# Patient Record
Sex: FEMALE | Race: WHITE | NOT HISPANIC OR LATINO | Employment: FULL TIME
[De-identification: names, ages, dates, MRNs, and addresses within clinical notes are randomized per-mention and may not be internally consistent; named-entity substitution may affect disease eponyms.]

---

## 2017-07-15 ENCOUNTER — HEALTH MAINTENANCE LETTER (OUTPATIENT)
Age: 22
End: 2017-07-15

## 2018-01-22 ENCOUNTER — OFFICE VISIT - HEALTHEAST (OUTPATIENT)
Dept: FAMILY MEDICINE | Facility: CLINIC | Age: 23
End: 2018-01-22

## 2018-01-22 DIAGNOSIS — R11.10 RECURRENT VOMITING: ICD-10-CM

## 2018-01-22 DIAGNOSIS — R14.2 BELCHING: ICD-10-CM

## 2018-01-22 DIAGNOSIS — R87.610 ATYPICAL SQUAMOUS CELLS OF UNDETERMINED SIGNIFICANCE (ASCUS) ON PAPANICOLAOU SMEAR OF CERVIX: ICD-10-CM

## 2018-01-22 ASSESSMENT — MIFFLIN-ST. JEOR: SCORE: 1343.02

## 2018-01-26 ENCOUNTER — RECORDS - HEALTHEAST (OUTPATIENT)
Dept: ADMINISTRATIVE | Facility: OTHER | Age: 23
End: 2018-01-26

## 2018-02-01 ENCOUNTER — AMBULATORY - HEALTHEAST (OUTPATIENT)
Dept: FAMILY MEDICINE | Facility: CLINIC | Age: 23
End: 2018-02-01

## 2018-02-01 DIAGNOSIS — K21.9 GERD (GASTROESOPHAGEAL REFLUX DISEASE): ICD-10-CM

## 2018-06-21 ENCOUNTER — OFFICE VISIT - HEALTHEAST (OUTPATIENT)
Dept: FAMILY MEDICINE | Facility: CLINIC | Age: 23
End: 2018-06-21

## 2018-06-21 DIAGNOSIS — J03.90 TONSILLITIS: ICD-10-CM

## 2018-06-21 DIAGNOSIS — J35.8 TONSILLOLITH: ICD-10-CM

## 2018-07-17 ENCOUNTER — OFFICE VISIT - HEALTHEAST (OUTPATIENT)
Dept: OTOLARYNGOLOGY | Facility: CLINIC | Age: 23
End: 2018-07-17

## 2018-07-17 DIAGNOSIS — J35.01 CHRONIC TONSILLITIS: ICD-10-CM

## 2018-08-15 ENCOUNTER — OFFICE VISIT - HEALTHEAST (OUTPATIENT)
Dept: FAMILY MEDICINE | Facility: CLINIC | Age: 23
End: 2018-08-15

## 2018-08-15 DIAGNOSIS — Z01.818 ENCOUNTER FOR PREOPERATIVE EXAMINATION FOR GENERAL SURGICAL PROCEDURE: ICD-10-CM

## 2018-08-15 DIAGNOSIS — J35.8 TONSILLOLITH: ICD-10-CM

## 2018-08-15 LAB
HCG UR QL: NEGATIVE
HGB BLD-MCNC: 12.5 G/DL (ref 12–16)
SP GR UR STRIP: 1.02 (ref 1–1.03)

## 2018-08-22 ASSESSMENT — MIFFLIN-ST. JEOR: SCORE: 1374.78

## 2018-08-25 ENCOUNTER — ANESTHESIA - HEALTHEAST (OUTPATIENT)
Dept: SURGERY | Facility: AMBULATORY SURGERY CENTER | Age: 23
End: 2018-08-25

## 2018-08-27 ENCOUNTER — SURGERY - HEALTHEAST (OUTPATIENT)
Dept: SURGERY | Facility: AMBULATORY SURGERY CENTER | Age: 23
End: 2018-08-27

## 2018-10-02 ENCOUNTER — OFFICE VISIT - HEALTHEAST (OUTPATIENT)
Dept: OTOLARYNGOLOGY | Facility: CLINIC | Age: 23
End: 2018-10-02

## 2018-10-02 DIAGNOSIS — J35.01 CHRONIC TONSILLITIS: ICD-10-CM

## 2019-05-02 ENCOUNTER — OFFICE VISIT - HEALTHEAST (OUTPATIENT)
Dept: FAMILY MEDICINE | Facility: CLINIC | Age: 24
End: 2019-05-02

## 2019-05-02 DIAGNOSIS — Z12.4 SCREENING FOR CERVICAL CANCER: ICD-10-CM

## 2019-05-02 DIAGNOSIS — G43.009 MIGRAINE WITHOUT AURA AND WITHOUT STATUS MIGRAINOSUS, NOT INTRACTABLE: ICD-10-CM

## 2019-06-23 ENCOUNTER — COMMUNICATION - HEALTHEAST (OUTPATIENT)
Dept: FAMILY MEDICINE | Facility: CLINIC | Age: 24
End: 2019-06-23

## 2019-09-27 ENCOUNTER — OFFICE VISIT - HEALTHEAST (OUTPATIENT)
Dept: INTERNAL MEDICINE | Facility: CLINIC | Age: 24
End: 2019-09-27

## 2019-09-27 DIAGNOSIS — J01.90 ACUTE NON-RECURRENT SINUSITIS, UNSPECIFIED LOCATION: ICD-10-CM

## 2019-11-12 ENCOUNTER — COMMUNICATION - HEALTHEAST (OUTPATIENT)
Dept: FAMILY MEDICINE | Facility: CLINIC | Age: 24
End: 2019-11-12

## 2020-01-20 ENCOUNTER — COMMUNICATION - HEALTHEAST (OUTPATIENT)
Dept: FAMILY MEDICINE | Facility: CLINIC | Age: 25
End: 2020-01-20

## 2020-01-20 DIAGNOSIS — Z78.9 USES BIRTH CONTROL: ICD-10-CM

## 2020-03-23 ENCOUNTER — COMMUNICATION - HEALTHEAST (OUTPATIENT)
Dept: FAMILY MEDICINE | Facility: CLINIC | Age: 25
End: 2020-03-23

## 2020-03-23 DIAGNOSIS — Z78.9 USES BIRTH CONTROL: ICD-10-CM

## 2020-04-29 ENCOUNTER — OFFICE VISIT - HEALTHEAST (OUTPATIENT)
Dept: FAMILY MEDICINE | Facility: CLINIC | Age: 25
End: 2020-04-29

## 2020-04-29 DIAGNOSIS — R61 NIGHT SWEATS: ICD-10-CM

## 2020-06-01 ENCOUNTER — COMMUNICATION - HEALTHEAST (OUTPATIENT)
Dept: FAMILY MEDICINE | Facility: CLINIC | Age: 25
End: 2020-06-01

## 2020-06-01 DIAGNOSIS — Z78.9 USES BIRTH CONTROL: ICD-10-CM

## 2020-10-06 ENCOUNTER — OFFICE VISIT - HEALTHEAST (OUTPATIENT)
Dept: FAMILY MEDICINE | Facility: CLINIC | Age: 25
End: 2020-10-06

## 2020-10-06 ENCOUNTER — AMBULATORY - HEALTHEAST (OUTPATIENT)
Dept: LAB | Facility: CLINIC | Age: 25
End: 2020-10-06

## 2020-10-06 DIAGNOSIS — R31.9 URINARY TRACT INFECTION WITH HEMATURIA, SITE UNSPECIFIED: ICD-10-CM

## 2020-10-06 DIAGNOSIS — R30.0 DYSURIA: ICD-10-CM

## 2020-10-06 DIAGNOSIS — R35.0 URINARY FREQUENCY: ICD-10-CM

## 2020-10-06 DIAGNOSIS — N39.0 URINARY TRACT INFECTION WITH HEMATURIA, SITE UNSPECIFIED: ICD-10-CM

## 2020-10-06 LAB
ALBUMIN UR-MCNC: ABNORMAL MG/DL
APPEARANCE UR: ABNORMAL
BACTERIA #/AREA URNS HPF: ABNORMAL HPF
BILIRUB UR QL STRIP: NEGATIVE
COLOR UR AUTO: YELLOW
GLUCOSE UR STRIP-MCNC: NEGATIVE MG/DL
HGB UR QL STRIP: ABNORMAL
KETONES UR STRIP-MCNC: NEGATIVE MG/DL
LEUKOCYTE ESTERASE UR QL STRIP: ABNORMAL
NITRATE UR QL: NEGATIVE
PH UR STRIP: 5.5 [PH] (ref 5–8)
RBC #/AREA URNS AUTO: ABNORMAL HPF
SP GR UR STRIP: 1.02 (ref 1–1.03)
SQUAMOUS #/AREA URNS AUTO: ABNORMAL LPF
UROBILINOGEN UR STRIP-ACNC: ABNORMAL
WBC #/AREA URNS AUTO: ABNORMAL HPF

## 2020-10-08 LAB — BACTERIA SPEC CULT: ABNORMAL

## 2020-10-11 ENCOUNTER — COMMUNICATION - HEALTHEAST (OUTPATIENT)
Dept: FAMILY MEDICINE | Facility: CLINIC | Age: 25
End: 2020-10-11

## 2020-10-12 ENCOUNTER — COMMUNICATION - HEALTHEAST (OUTPATIENT)
Dept: FAMILY MEDICINE | Facility: CLINIC | Age: 25
End: 2020-10-12

## 2020-10-12 ENCOUNTER — OFFICE VISIT - HEALTHEAST (OUTPATIENT)
Dept: FAMILY MEDICINE | Facility: CLINIC | Age: 25
End: 2020-10-12

## 2020-10-12 DIAGNOSIS — R30.0 DYSURIA: ICD-10-CM

## 2020-10-12 DIAGNOSIS — M54.50 ACUTE BILATERAL LOW BACK PAIN WITHOUT SCIATICA: ICD-10-CM

## 2020-10-12 LAB
ALBUMIN UR-MCNC: ABNORMAL MG/DL
APPEARANCE UR: CLEAR
BACTERIA #/AREA URNS HPF: ABNORMAL HPF
BILIRUB UR QL STRIP: NEGATIVE
COLOR UR AUTO: YELLOW
GLUCOSE UR STRIP-MCNC: NEGATIVE MG/DL
HCG UR QL: NEGATIVE
HGB UR QL STRIP: ABNORMAL
KETONES UR STRIP-MCNC: ABNORMAL MG/DL
LEUKOCYTE ESTERASE UR QL STRIP: ABNORMAL
MUCOUS THREADS #/AREA URNS LPF: ABNORMAL LPF
NITRATE UR QL: NEGATIVE
PH UR STRIP: 6 [PH] (ref 5–8)
RBC #/AREA URNS AUTO: ABNORMAL HPF
SP GR UR STRIP: >=1.03 (ref 1–1.03)
SQUAMOUS #/AREA URNS AUTO: ABNORMAL LPF
UROBILINOGEN UR STRIP-ACNC: ABNORMAL
WBC #/AREA URNS AUTO: ABNORMAL HPF

## 2020-10-13 LAB — BACTERIA SPEC CULT: NO GROWTH

## 2020-10-19 ENCOUNTER — OFFICE VISIT (OUTPATIENT)
Dept: URGENT CARE | Facility: URGENT CARE | Age: 25
End: 2020-10-19
Payer: COMMERCIAL

## 2020-10-19 VITALS
DIASTOLIC BLOOD PRESSURE: 84 MMHG | HEART RATE: 100 BPM | TEMPERATURE: 98.4 F | SYSTOLIC BLOOD PRESSURE: 124 MMHG | BODY MASS INDEX: 25.61 KG/M2 | HEIGHT: 64 IN | WEIGHT: 150 LBS

## 2020-10-19 DIAGNOSIS — S61.211A LACERATION OF LEFT INDEX FINGER WITHOUT FOREIGN BODY WITHOUT DAMAGE TO NAIL, INITIAL ENCOUNTER: Primary | ICD-10-CM

## 2020-10-19 PROCEDURE — 12001 RPR S/N/AX/GEN/TRNK 2.5CM/<: CPT | Performed by: NURSE PRACTITIONER

## 2020-10-19 RX ORDER — NITROFURANTOIN 25; 75 MG/1; MG/1
CAPSULE ORAL
COMMUNITY
Start: 2020-10-06 | End: 2022-01-25

## 2020-10-19 ASSESSMENT — MIFFLIN-ST. JEOR: SCORE: 1410.4

## 2020-10-19 NOTE — PROGRESS NOTES
"SUBJECTIVE:     Chief Complaint   Patient presents with     Urgent Care     Laceration     cut to left index finger about 12:55 pm today with a new knife while cutting a bagle.      Regina Hernandez is a 25 year old female who presents to the clinic with a laceration on the left index finger sustained 1 hour(s) ago.  This is a non-work related injury.    Mechanism of injury: knife.    Associated symptoms: Denies numbness, weakness, or loss of function  Last tetanus booster within 10 years: yes    Medications and allergies reviewed.    Past Medical History:   Diagnosis Date     Headache(784.0) 9/06    headaches-advil-Amitriptyline     Unspecified otitis media      Volume depletion      EXAM:   The patient appears today in alert,no apparent distress distress  VITALS: /84   Pulse 100   Temp 98.4  F (36.9  C) (Oral)   Ht 1.626 m (5' 4\")   Wt 68 kg (150 lb)   Breastfeeding No   BMI 25.75 kg/m      Size of laceration: 5 mm  Characteristics of the laceration: active bleeding  Tendon function intact: yes  Sensation to light touch intact: yes  Pulses intact: yes  Picture included in patient's chart: no    Assessment:    ICD-10-CM    1. Laceration of left index finger without foreign body without damage to nail, initial encounter  S61.211A        PLAN:  PROCEDURE NOTE::  Wound cleaned with HIBICLENS  Pressure dressing applied until bleeding stopped  Dermabond was applied  After care instructions:  Keep wound clean and dry for the next 24-48 hours    JORGE Lopez, CNP  [facility] Provider  "

## 2020-10-19 NOTE — PATIENT INSTRUCTIONS
Patient Education     Extremity Laceration: Skin Glue  A laceration is a cut through the skin. You have a laceration that has been closed with skin glue. This is used on cuts that have smooth edges and are not infected. It's best used on straight, clean cuts on areas that do not get a lot of tension.  You may need a tetanus shot. This is given if you have no record of a shot, and the object that caused the cut may lead to tetanus.  Home care    Your healthcare provider may prescribe an antibiotic. This is to help prevent infection. Follow all instructions for taking this medicine. Take the medicine every day until it is gone or you are told to stop. You should not have any left over.    The healthcare provider may prescribe medicines for pain. Follow instructions for taking them.    Follow the healthcare provider s instructions on how to care for the cut.    No bandage is needed. Skin glue peels off on its own within 5 to 10 days. Most skin wounds heal within 10 days.    Keep the wound clean. You may shower or bathe as usual, but do not use soaps, lotions, or ointments on the wound area. Do not scrub the wound. After bathing, pat the wound dry with a soft towel.    Don't scratch, rub, or pick at the film. Don't place tape directly over the film.    Don't put liquids such as peroxide, ointments, or creams on the wound while the skin glue is in place. Many oil based products can weaken and dissolve the glue.    Don't do any activities that may reinjure your wound.    Don't do any activities that cause heavy sweating. Protect the wound from sunlight.    Most skin wounds heal without problems. But an infection sometimes occurs even with proper treatment. Watch for the signs of infection listed below.  Follow-up care  Follow up as directed with your healthcare provider, or as advised.  When to seek medical advice  Call your healthcare provider right away if any of these occur:    Wound bleeding not controlled by direct  pressure    Signs of infection, including increasing pain in the wound, increasing wound redness or swelling, or pus or bad odor coming from the wound    Fever of 100.4 F (38. C) or higher, or as directed by your healthcare provider    Wound edges reopen    Wound changes colors    Numbness around the wound     Decreased movement around the injured area  Date Last Reviewed: 7/1/2017 2000-2019 [employer]. [address] [place] [address]. All rights reserved. This information is not intended as a substitute for professional medical care. Always follow your healthcare professional's instructions.

## 2020-11-09 ENCOUNTER — COMMUNICATION - HEALTHEAST (OUTPATIENT)
Dept: FAMILY MEDICINE | Facility: CLINIC | Age: 25
End: 2020-11-09

## 2020-11-09 DIAGNOSIS — Z78.9 USES BIRTH CONTROL: ICD-10-CM

## 2021-04-19 ENCOUNTER — COMMUNICATION - HEALTHEAST (OUTPATIENT)
Dept: INTERNAL MEDICINE | Facility: CLINIC | Age: 26
End: 2021-04-19

## 2021-04-19 DIAGNOSIS — Z78.9 USES BIRTH CONTROL: ICD-10-CM

## 2021-05-17 ENCOUNTER — OFFICE VISIT (OUTPATIENT)
Dept: MIDWIFE SERVICES | Facility: CLINIC | Age: 26
End: 2021-05-17
Payer: COMMERCIAL

## 2021-05-17 VITALS
SYSTOLIC BLOOD PRESSURE: 123 MMHG | BODY MASS INDEX: 27.12 KG/M2 | WEIGHT: 158 LBS | TEMPERATURE: 98.3 F | HEART RATE: 89 BPM | DIASTOLIC BLOOD PRESSURE: 73 MMHG

## 2021-05-17 DIAGNOSIS — Z00.00 ANNUAL PHYSICAL EXAM: Primary | ICD-10-CM

## 2021-05-17 DIAGNOSIS — Z30.41 ORAL CONTRACEPTIVE PILL SURVEILLANCE: ICD-10-CM

## 2021-05-17 PROCEDURE — 99385 PREV VISIT NEW AGE 18-39: CPT | Performed by: ADVANCED PRACTICE MIDWIFE

## 2021-05-17 SDOH — HEALTH STABILITY: MENTAL HEALTH: HOW OFTEN DO YOU HAVE A DRINK CONTAINING ALCOHOL?: MONTHLY OR LESS

## 2021-05-17 SDOH — HEALTH STABILITY: MENTAL HEALTH: HOW MANY STANDARD DRINKS CONTAINING ALCOHOL DO YOU HAVE ON A TYPICAL DAY?: 1 OR 2

## 2021-05-17 SDOH — HEALTH STABILITY: MENTAL HEALTH: HOW OFTEN DO YOU HAVE 6 OR MORE DRINKS ON ONE OCCASION?: LESS THAN MONTHLY
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
## 2021-05-17 NOTE — NURSING NOTE
"Chief Complaint   Patient presents with     Consult       Initial /73   Pulse 89   Temp 98.3  F (36.8  C) (Temporal)   Wt 71.7 kg (158 lb)   BMI 27.12 kg/m   Estimated body mass index is 27.12 kg/m  as calculated from the following:    Height as of 10/19/20: 1.626 m (5' 4\").    Weight as of this encounter: 71.7 kg (158 lb).  BP completed using cuff size: regular    Questioned patient about current smoking habits.  Pt. has never smoked.      No obstetric history on file.    The following HM Due: NONE      The following patient reported/Care Every where data was sent to:  P ABSTRACT QUALITY INITIATIVES [61590]        N/a      Deedee Hoff MA                "

## 2021-05-19 PROCEDURE — G0145 SCR C/V CYTO,THINLAYER,RESCR: HCPCS | Performed by: ADVANCED PRACTICE MIDWIFE

## 2021-05-21 LAB
COPATH REPORT: NORMAL
PAP: NORMAL

## 2021-05-24 PROBLEM — R87.610 ATYPICAL SQUAMOUS CELLS OF UNDETERMINED SIGNIFICANCE (ASCUS) ON PAPANICOLAOU SMEAR OF CERVIX: Status: ACTIVE | Noted: 2021-05-24

## 2021-05-24 PROBLEM — R87.610 ATYPICAL SQUAMOUS CELLS OF UNDETERMINED SIGNIFICANCE (ASCUS) ON PAPANICOLAOU SMEAR OF CERVIX: Status: RESOLVED | Noted: 2021-05-24 | Resolved: 2021-05-24

## 2021-05-28 NOTE — PROGRESS NOTES
Assessment & Plan   1. Migraine without aura and without status migrainosus, not intractable  History of migraine without aura with worsening frequency of headaches almost exclusively during her hormone free week.  Discussed she is likely experiencing estrogen withdrawal headaches and advised on possible adjustments to birth control including continuous OCP use, OCP with estrogen in placebo pills, switching to progestin only method such as Depo or IUD.  Ultimately she would like to continue on OCPs and try taking continuously.  Recommended allowing withdrawal bleed every 3-4 months. Follow up if no improvement and she would consider Depo.  Again reviewed no aura symptoms with her migraines so low risk for VTE  - JUNEL FE 1.5/30, 28, 1.5 mg-30 mcg (21)/75 mg (7) per tablet; Take 1 tablet by mouth daily. Take continuously  Dispense: 4 Package; Refill: 3    2. Contraception  - JUNEL FE 1.5/30, 28, 1.5 mg-30 mcg (21)/75 mg (7) per tablet; Take 1 tablet by mouth daily. Take continuously  Dispense: 4 Package; Refill: 3    3. Screening for cervical cancer  ASCUS with negative HPV in 2017 per her report. She was advised to repeat in one year though did not return for her pap so did complete this today.  If normal, may return to regular screening schedule every three years.    - Gynecologic Cytology (PAP Smear)    Tootie Hyatt CNP    Subjective   Chief Complaint:  Concerns (getg migraines really consistent around week of menstrual cycle.  Neurologist recommended to switch and try something else. )    HPI:   Regina Heranndez is a 24 y.o. female who presents for contraception, headaches.     She has a history of migraines without aura and follows with neurology.  She states she has been experiencing headaches clustered around her menstrual cycle, almost exclusively in the placebo week of her pills. Her neurologist recommended adjusting her birth control to try to improve these hormonal withdrawal headaches.  She has not  previously tried other contraceptive methods.  Monogamous relationship.       Allergies:  has No Known Allergies.    SH/FH:  Social History and Family History reviewed and updated.   Tobacco Status:  She  reports that she has never smoked. She has never used smokeless tobacco.    Review of Systems:  A complete head to toe ROS is negative unless otherwise noted in HPI    Objective   There were no vitals filed for this visit.    Physical Exam:  GENERAL: Alert, well-appearing female .   PSYCH: Pleasant mood, affect appropriate.     FEMALE: External genitalia without lesions. Vaginal walls and cervix without lesions or masses. No abnormal discharge. Pap smear obtained. On bimanual palpation, uterus mobile, normal shape and contour. No adnexal masses or tenderness.

## 2021-05-28 NOTE — PATIENT INSTRUCTIONS - HE
Recommendations from today's visit                                                       1. We will first try having you take your birth control pill continuously. I recommend allowing a bleed every 3-4 weeks.  You can take just five of the placebo pills and then restart a new pack.     2. If this is not effective for the headaches, we could try the Depo shot    Next appointment: one year or sooner as needed    To reschedule your appointment, please call the clinic directly at [phone].   It was a pleasure seeing you today! I look forward to seeing you again.

## 2021-05-30 NOTE — TELEPHONE ENCOUNTER
Refill Approved    Rx renewed per Medication Renewal Policy. Medication was last renewed on 5.2.19.    Madelyn Wade, [facility] Triage/Med Refill 6/25/2019     Requested Prescriptions   Signed Prescriptions Disp Refills    BLISOVI FE 1.5/30, 28, 1.5 mg-30 mcg (21)/75 mg (7) per tablet 84 tablet 2     Sig: TAKE 1 TABLET BY MOUTH DAILY       Oral Contraceptives Protocol Passed - 6/23/2019 11:17 PM        Passed - Visit with PCP or prescribing provider visit in last 12 months      Last office visit with prescriber/PCP: 5/2/2019 Tootie Hyatt CNP OR same dept: 5/2/2019 Tootie Hyatt CNP OR same specialty: 5/2/2019 Tootie Hyatt CNP  Last physical: 8/15/2018 Last MTM visit: Visit date not found   Next visit within 3 mo: Visit date not found  Next physical within 3 mo: Visit date not found  Prescriber OR PCP: Tootie Hyatt CNP  Last diagnosis associated with med order: 1. Contraception  - BLISOVI FE 1.5/30, 28, 1.5 mg-30 mcg (21)/75 mg (7) per tablet; TAKE 1 TABLET BY MOUTH DAILY  Dispense: 84 tablet; Refill: 2    If protocol passes may refill for 12 months if within 3 months of last provider visit (or a total of 15 months).

## 2021-05-31 VITALS — HEIGHT: 64 IN | BODY MASS INDEX: 23.56 KG/M2 | WEIGHT: 138 LBS

## 2021-06-01 VITALS — HEIGHT: 64 IN | BODY MASS INDEX: 24.75 KG/M2 | WEIGHT: 145 LBS

## 2021-06-01 VITALS — WEIGHT: 144.75 LBS | BODY MASS INDEX: 25.24 KG/M2

## 2021-06-01 VITALS — BODY MASS INDEX: 25.33 KG/M2 | WEIGHT: 145.25 LBS

## 2021-06-01 NOTE — PROGRESS NOTES
[facility] Follow Up Note    Regina Dominguezz   24 y.o. female    Date of Visit: 9/27/2019    Chief Complaint   Patient presents with     Cough     OTC meds are not helping much     Sinus Problem     Subjective  This is a 24-year-old lady from another clinic who comes in because of symptoms of a respiratory infection.  These been going on for about 2 to 3 days and seem to center around her sinuses.  She has had congestion and pain in the face.  Throat has been sore with some discomfort into the ears.  She has had a minimal dry cough and no fever or chills.  She did have her tonsils removed about a year ago and is done fine since then.  She is primarily concerned because she is in her best friend's wedding this weekend and is hoping we can help her out.    ROS A comprehensive review of systems was performed and was otherwise negative    Medications, allergies, and problem list were reviewed and updated    Exam  General Appearance:   Examination her blood pressure is 122/60.  Weight is 148 pounds and BMI is 25.81.    Heart rhythm is stable with rate of 82 and no ectopy.    Lungs are clear.    No enlarged cervical lymph nodes.    Throat shows some swelling in the back of the throat and erythema but no exudate.    No significant facial pain.    Both ear canals are open and clean with normal tympanic membranes.      Assessment/Plan  1. Acute non-recurrent sinusitis, unspecified location  azithromycin (ZITHROMAX Z-BELLO) 250 MG tablet     Probable sinus infection.  This may be viral but given the situation if we can help her out we will so I will put her on a Z-Bello and she will follow-up if she is not improving.      Deven Lozoya MD      Current Outpatient Medications on File Prior to Visit   Medication Sig     BLISOVI FE 1.5/30, 28, 1.5 mg-30 mcg (21)/75 mg (7) per tablet TAKE 1 TABLET BY MOUTH DAILY     JUNEL FE 1.5/30, 28, 1.5 mg-30 mcg (21)/75 mg (7) per tablet Take 1 tablet by mouth daily.  Take continuously     No current facility-administered medications on file prior to visit.      No Known Allergies  Social History     Tobacco Use     Smoking status: Never Smoker     Smokeless tobacco: Never Used   Substance Use Topics     Alcohol use: Yes     Comment: Occasionally     Drug use: No

## 2021-06-03 VITALS — WEIGHT: 142 LBS | BODY MASS INDEX: 24.76 KG/M2

## 2021-06-03 VITALS
OXYGEN SATURATION: 98 % | BODY MASS INDEX: 25.81 KG/M2 | SYSTOLIC BLOOD PRESSURE: 122 MMHG | DIASTOLIC BLOOD PRESSURE: 60 MMHG | HEART RATE: 83 BPM | WEIGHT: 148 LBS

## 2021-06-05 VITALS
DIASTOLIC BLOOD PRESSURE: 60 MMHG | OXYGEN SATURATION: 99 % | WEIGHT: 147 LBS | HEART RATE: 86 BPM | BODY MASS INDEX: 25.63 KG/M2 | SYSTOLIC BLOOD PRESSURE: 110 MMHG

## 2021-06-05 NOTE — TELEPHONE ENCOUNTER
Refill Approved    Rx renewed per Medication Renewal Policy. Medication was last renewed on 6/25/19.    Melissa Bowen, [facility] Triage/Med Refill 1/21/2020     Requested Prescriptions   Pending Prescriptions Disp Refills     BLISOVI FE 1.5/30, 28, 1.5 mg-30 mcg (21)/75 mg (7) per tablet [Pharmacy Med Name: BLISOVI FE 1.5/30 TABLETS 28S] 84 tablet 2     Sig: TAKE 1 TABLET BY MOUTH DAILY       Oral Contraceptives Protocol Passed - 1/20/2020 10:09 PM        Passed - Visit with PCP or prescribing provider visit in last 12 months      Last office visit with prescriber/PCP: 5/2/2019 Tootie Hyatt CNP OR same dept: 5/2/2019 Tootie Hyatt CNP OR same specialty: 5/2/2019 Tootie Hyatt CNP  Last physical: 8/15/2018 Last MTM visit: Visit date not found   Next visit within 3 mo: Visit date not found  Next physical within 3 mo: Visit date not found  Prescriber OR PCP: Tootie Hyatt CNP  Last diagnosis associated with med order: 1. Uses birth control  - BLISOVI FE 1.5/30, 28, 1.5 mg-30 mcg (21)/75 mg (7) per tablet [Pharmacy Med Name: BLISOVI FE 1.5/30 TABLETS 28S]; TAKE 1 TABLET BY MOUTH DAILY  Dispense: 84 tablet; Refill: 2    If protocol passes may refill for 12 months if within 3 months of last provider visit (or a total of 15 months).

## 2021-06-07 NOTE — PROGRESS NOTES
"ASSESMENT AND PLAN:  Diagnoses and all orders for this visit:    Night sweats  Most likely hormonal related to the stopping and restarting of the birth control pill as detailed below.  Reviewed differential diagnosis with the patient and if her symptoms do not resolve over the next 2 to 3 weeks I would recommend that she come in for reevaluation and consider the following lab work: CBC, QuantiFERON, thyroid cascade.  Patient is also going to check a home pregnancy test.  She is in agreement with the plan.      Reviewed the risks and benefits of the treatment plan with the patient and/or caregiver and we discussed indications for routine and emergent follow-up.    Telephone visit done in lieu of a clinic visit because of ongoing concerns and restrictions with the COVID-19 pandemic.  Total telephone minutes with patient - 8.      Patient is being evaluated via a billable telephone visit.      The patient has been notified of following:     \"This telephone visit will be conducted via a call between you and your physician/provider. We have found that certain health care needs can be provided without the need for a physical exam.  This service lets us provide the care you need with a short phone conversation.  If a prescription is necessary we can send it directly to your pharmacy.  If lab work is needed we can place an order for that and you can then stop by our lab to have the test done at a later time.    Telephone visits are billed at different rates depending on your insurance coverage. During this emergency period, for some insurers they may be billed the same as an in-person visit.  Please reach out to your insurance provider with any questions.    If during the course of the call the physician/provider feels a telephone visit is not appropriate, you will not be charged for this service.\"    Patient has given verbal consent to a Telephone visit? Yes            SUBJECTIVE: 25-year-old female with a 3-week history " of night sweats.  Patient reports that she has in the past frequently felt hot at night and been mildly sweaty but the sweats have been more severe over the last 2 to 3 weeks.  She has not had any other symptoms including no cough, no fever, no shortness of breath, no vomiting, no diarrhea.  No weight loss.  She has not had any travel in the last 2 months and does not work with high risk tuberculosis populations.  No known tuberculosis contacts.  She did stop taking her birth control pill about a month ago for 5 days.  She then restarted it.  During that time she had a short menstrual period.  Since then she has been back to taking her birth control pill daily.  She is in a sexual relationship and has not checked a pregnancy test.    No past medical history on file.  Patient Active Problem List   Diagnosis     Migraine without aura and without status migrainosus, not intractable     Current Outpatient Medications   Medication Sig Dispense Refill     JUNEL FE 1.5/30, 28, 1.5 mg-30 mcg (21)/75 mg (7) per tablet Take 1 tablet by mouth daily. Take continuously 4 Package 3     BLISOVI FE 1.5/30, 28, 1.5 mg-30 mcg (21)/75 mg (7) per tablet TAKE 1 TABLET BY MOUTH DAILY 84 tablet 0     No current facility-administered medications for this visit.      Social History     Tobacco Use   Smoking Status Never Smoker   Smokeless Tobacco Never Used       OBJECTICE: LMP  (Within Months) Comment: Irregular     No results found for this or any previous visit (from the past 24 hour(s)).        Joaquin Cruz

## 2021-06-07 NOTE — TELEPHONE ENCOUNTER
Refill Approved    Rx renewed per Medication Renewal Policy. Medication was last renewed on 1/21/20.    Melissa Bowen, [facility] Triage/Med Refill 3/25/2020     Requested Prescriptions   Pending Prescriptions Disp Refills     BLISOVI FE 1.5/30, 28, 1.5 mg-30 mcg (21)/75 mg (7) per tablet [Pharmacy Med Name: BLISOVI FE 1.5/30 TABLETS 28S] 84 tablet 1     Sig: TAKE 1 TABLET BY MOUTH DAILY       Oral Contraceptives Protocol Passed - 3/23/2020  8:48 PM        Passed - Visit with PCP or prescribing provider visit in last 12 months      Last office visit with prescriber/PCP: 5/2/2019 Tootie Hyatt CNP OR same dept: 5/2/2019 Tootie Hyatt CNP OR same specialty: 5/2/2019 Tootie Hyatt CNP  Last physical: 8/15/2018 Last MTM visit: Visit date not found   Next visit within 3 mo: Visit date not found  Next physical within 3 mo: Visit date not found  Prescriber OR PCP: Tootie Hyatt CNP  Last diagnosis associated with med order: 1. Uses birth control  - BLISOVI FE 1.5/30, 28, 1.5 mg-30 mcg (21)/75 mg (7) per tablet [Pharmacy Med Name: BLISOVI FE 1.5/30 TABLETS 28S]; TAKE 1 TABLET BY MOUTH DAILY  Dispense: 84 tablet; Refill: 1    If protocol passes may refill for 12 months if within 3 months of last provider visit (or a total of 15 months).

## 2021-06-08 NOTE — TELEPHONE ENCOUNTER
Medication Question or Clarification  Who is calling: Patient  What medication are you calling about (include dose and sig)?:   BLISOVI FE 1.5/30, 28, 1.5 mg-30 mcg (21)/75 mg (7) per tablet           Summary: TAKE 1 TABLET BY MOUTH DAILY, Normal          Who prescribed the medication?: Tootie Hyatt, JANES  What is your question/concern?: Patient takes an active pill everyday.  She does not use the sugar tabs as this medication is for migraines.  This issue is with daily use she runs out too soon.  She is asking to have her prescription changed.  Her pharmacy is closed.  See new pharmacy.    Requested Pharmacy: [facility]  Okay to leave a detailed message?: No

## 2021-06-12 NOTE — PROGRESS NOTES
"Regina Hernandez is a 25 y.o. female who is being evaluated via a billable telephone visit.      The patient has been notified of following:     \"This telephone visit will be conducted via a call between you and your physician/provider. We have found that certain health care needs can be provided without the need for a physical exam.  This service lets us provide the care you need with a short phone conversation.  If a prescription is necessary we can send it directly to your pharmacy.  If lab work is needed we can place an order for that and you can then stop by our lab to have the test done at a later time.    Telephone visits are billed at different rates depending on your insurance coverage. During this emergency period, for some insurers they may be billed the same as an in-person visit.  Please reach out to your insurance provider with any questions.    If during the course of the call the physician/provider feels a telephone visit is not appropriate, you will not be charged for this service.\"    Patient has given verbal consent to a Telephone visit? Yes    What phone number would you like to be contacted at? [phone]    Patient would like to receive their AVS by AVS Preference: Rufus.    Chief Complaint   Patient presents with     Urinary Tract Infection     frequent urination, burning sensation      Regina has had symptoms for a night a night or two: she has just been starting to go the bathroom more often. Today feels like she CONSTANTLY has to go - pees a little and starts to burn    - She has been using a different soap lately - feels like it's \"nothing crazy\"   - She is sexually active,  -  - She does urinate after intercourse,  no concerns about sexually transmitted infection   -  is not using new body product    ROS    - no fever or nausea, no back pain    Objective: she sounds well    Assessment/Plan:    1. Urinary frequency/2. Dysuria  Discussed differential diagnosis bladder/urethra " irritation, STI, UTI.  She is willing to come in for a Urinalysis  - Urinalysis-UC if Indicated; Future  Lab Results   Component Value Date    COLORU Yellow 10/06/2020    CLARITYU Cloudy (!) 10/06/2020    GLUCOSEU Negative 10/06/2020    BILIRUBINUR Negative 10/06/2020    KETONESU Negative 10/06/2020    SPECGRAV 1.020 10/06/2020    HGBUA Moderate (!) 10/06/2020    PHUR 5.5 10/06/2020    PROTEINUA 100 mg/dL (!) 10/06/2020    UROBILINOGEN 0.2 E.U./dL 10/06/2020    NITRITE Negative 10/06/2020    LEUKOCYTESUR Moderate (!) 10/06/2020    BACTERIA Few (!) 10/06/2020    RBCUA 10-25 (!) 10/06/2020    WBCUA 10-25 (!) 10/06/2020    SQUAMEPI 0-5 10/06/2020         3. Urinary tract infection with hematuria, site unspecified  - nitrofurantoin, macrocrystal-monohydrate, (MACROBID) 100 MG capsule; Take 1 capsule (100 mg total) by mouth 2 (two) times a day for 7 days.  Dispense: 14 capsule; Refill: 0    Phone call duration:  10 minutes    Chula Schwartz MD

## 2021-06-12 NOTE — PROGRESS NOTES
Regina,      You still have a bit of a UTI it appears so I sent in a different medication to the pharmacy.  Your XR looked good.  Continue that new medication for a week and see if any improvement.  Call if any questions or concerns.    Conchis Whittington MD

## 2021-06-12 NOTE — PROGRESS NOTES
Internal Medicine Office Visit  [facility]   Patient Name: Regina Hernandez  Patient Age: 25 y.o.  YOB: 1995  MRN: [id number]    Date of Visit: 10/12/2020  Reason for Office Visit:   Chief Complaint   Patient presents with     Follow-up     UTI-not sure its better, pelvic are painfuly           Assessment / Plan / Medical Decision Makin. Dysuria  Repeat UA and pregnancy test.  UA show still some infection and will change antibiotic due to ongoing symptoms although not a clean catch.  Discussed worsening symptoms and differential of pyelo and go to the ER if needed.  - Urinalysis-UC if Indicated  - Pregnancy, Urine  - Culture, Urine  - sulfamethoxazole-trimethoprim (SEPTRA) 400-80 mg per tablet; Take 1 tablet by mouth 2 (two) times a day for 10 days.  Dispense: 20 tablet; Refill: 0    2. Acute bilateral low back pain without sciatica  Ensure no stones and will do XR  - XR Abdomen AP; Future  - XR Abdomen AP        Health Maintenance Review  Health Maintenance   Topic Date Due     PREVENTIVE CARE VISIT  1995     HIV SCREENING  2010     ADVANCE CARE PLANNING  2013     INFLUENZA VACCINE RULE BASED (1) 2020     PAP SMEAR  2022     TD 18+ HE  2026     HEPATITIS B VACCINES  Completed     HPV VACCINES  Completed     TDAP ADULT ONE TIME DOSE  Completed     Pneumococcal Vaccine: Pediatrics (0 to 5 Years) and At-Risk Patients (6 to 64 Years)  Aged Out         I have discontinued Regina Hernandez's amoxicillin. I am also having her start on sulfamethoxazole-trimethoprim. Additionally, I am having her maintain her Blisovi Fe 1.5 (28), clindamycin, and nitrofurantoin (macrocrystal-monohydrate).      HPI:  Regina Hernandez is a 25 y.o. year old who presents to the office today for follow up from urinary tract infection diagnosed last Tuesday.  Abdominal pain and cramping noticed since last Tuesday but Saturday there was one episode of painful intercourse,  "worsening abdominal pain and cramping.  In addition, patient reported feeling like she had a fever, diarrhea, felt nauseous and vomited.  Last night severe lower back and abdominal pain.  Patient reports the pain last night was a 6/10 and described as a throbbing and \"sore inside/inner pain\".  Advil helps to decrease the discomfort.  Patient denies any hematuria, urgency, frequency or painful urination.  Patient reports vaginal spotting last week which has since resolved.  Patient reports no change in baseline vaginal discharge.           Review of Systems- pertinent positive in bold:  Constitutional: Fever, chills, night sweats  Gastrointestinal: abdominal pain, nausea/vomiting, change in appetite, change in bowel habits, constipation or diarrhea, rectal bleeding/dark stools  Urinary: painful urination, frequent urination, urinary urgency/incontinence, blood in urine/dark urine, nocturia  Genital: WOMEN: vaginal discharge or odor, bleeding/pain with intercourse, pelvic pain, vulvar/vaginal itching or burning, excessive menstrual bleeding, problems with sexual function  Hematologic/lymphatic: swollen lymph glands      Current Scheduled Meds:  Outpatient Encounter Medications as of 10/12/2020   Medication Sig Dispense Refill     BLISOVI FE 1.5/30, 28, 1.5 mg-30 mcg (21)/75 mg (7) per tablet Take 1 tablet by mouth daily. 112 tablet 0     clindamycin (CLINDAGEL) 1 % gel JEVON EXT TO FACE QAM       nitrofurantoin, macrocrystal-monohydrate, (MACROBID) 100 MG capsule Take 1 capsule (100 mg total) by mouth 2 (two) times a day for 7 days. 14 capsule 0     sulfamethoxazole-trimethoprim (SEPTRA) 400-80 mg per tablet Take 1 tablet by mouth 2 (two) times a day for 10 days. 20 tablet 0     [DISCONTINUED] amoxicillin (AMOXIL) 500 MG capsule Take 500 mg by mouth 2 (two) times a day.       No facility-administered encounter medications on file as of 10/12/2020.      No past medical history on file.  Past Surgical History:   Procedure " Laterality Date     TONSILLECTOMY Bilateral 8/27/2018    Procedure: BILATERAL TONSILLECTOMY;  Surgeon: Sena Torres MD;  Location: [facility];  Service:      WISDOM TOOTH EXTRACTION       Social History     Tobacco Use     Smoking status: Never Smoker     Smokeless tobacco: Never Used   Substance Use Topics     Alcohol use: Yes     Comment: Occasionally     Drug use: No       Objective / Physical Examination:  Vitals:    10/12/20 1124   BP: 110/60   Pulse: 86   SpO2: 99%   Weight: 147 lb (66.7 kg)     Wt Readings from Last 3 Encounters:   10/12/20 147 lb (66.7 kg)   09/27/19 148 lb (67.1 kg)   05/02/19 142 lb (64.4 kg)     Body mass index is 25.63 kg/m .     Constitutional: In no apparent distress  Cardiovascular: Regular rate and rhythm. No murmurs, rubs, or gallops. No edema. No carotid bruits.   Gastrointestinal: Bowel sounds active all four quadrants. Soft, non-tender. NO Cva tenderness  Skin: Warm and dry. Without suspicious looking lesions  Neuro: Normal gait  Psych: Alert and oriented x3.     Orders Placed This Encounter   Procedures     Culture, Urine     XR Abdomen AP     Urinalysis-UC if Indicated     Pregnancy, Urine   Followup: No follow-ups on file. earlier if needed.    Total time spent with patient was 15 min with >50% of time spent in face-to-face counseling regarding the above plan     Conchis Mathews, Doctorate of Nurse Practitioner, Family Nurse Practitioner Student

## 2021-06-12 NOTE — TELEPHONE ENCOUNTER
----- Message from Conchis Whittington MD sent at 10/12/2020  1:58 PM CDT -----  Merina,      You still have a bit of a UTI it appears so I sent in a different medication to the pharmacy.  Your XR looked good.  Continue that new medication for a week and see if any improvement.  Call if any questions or concerns.    Conchis Whittington MD

## 2021-06-15 NOTE — PROGRESS NOTES
Assessment & Plan   1. Belching:  Referral for upper GI, will follow up with results.  If normal, consider daily H2 blocker  - Ambulatory Referral for Upper GI Endoscopy    2. Recurrent vomiting  - Ambulatory Referral for Upper GI Endoscopy    3. Atypical squamous cells of undetermined significance (ASCUS) on Papanicolaou smear of cervix:  Plan to repeat in August of this year, will return for physical at that time.  Refill OCP until that time, denies risk factors for use.     Tootie Hyatt CNP     Total Time: 30 minutes.  Coordination of Care Time: 30 minutes spent including:  History, exam, discussion of possible diagnoses, testing today, next steps and follow up.    Subjective   Chief Complaint:  Establish Care and Referral (pt needs a referral)    HPI:   Regina Hernandez is a 22 y.o. female who presents for establish care and referral for endoscopy.    She previously followed at .  PMH notable for migraine, otherwise negative.      She states GI symptoms began one year ago. Difficulty with frequent burping, nausea and recurrent vomiting.  Improved with omeprazole though returned after tapering off.  Negative Celiac panel and H. Pylori.  She has tried a lactose free diet without improvement.  Previously compazine for N/V though has not experienced this for several months.  EGD was ordered by previous PCP.  Insurance change so in need of new referral.      HA:  Follows with neurology for migraine and tension HA.  States migraines present since adolescence.  No aura symptoms.  Severe concussion two years ago and daily HA since that time.  Migraines a couple times/week.  Botox helpful.     Health maintenance:  ASCUS on pap in 2017, plan to recheck 08/2018    PMH:   There is no problem list on file for this patient.      No past medical history on file.    Current Medications:   No current outpatient prescriptions on file prior to visit.     No current facility-administered medications on file prior to visit.   "      Allergies:  has No Known Allergies.    SH/FH:  Social History and Family History reviewed and updated.   Tobacco Status:  She  reports that she has never smoked. She has never used smokeless tobacco.    Review of Systems:  A complete head to toe ROS is negative unless otherwise noted in HPI    Objective     Vitals:    01/22/18 1444   BP: 110/68   Patient Site: Left Arm   Patient Position: Sitting   Cuff Size: Adult Regular   Pulse: 80   Weight: 138 lb (62.6 kg)   Height: 5' 3.5\" (1.613 m)     Wt Readings from Last 3 Encounters:   01/22/18 138 lb (62.6 kg)       Physical Exam:  GENERAL: Alert, well-appearing female  PSYCH: Pleasant mood, affect appropriate.     Labs:    No results found for this or any previous visit (from the past 168 hour(s)).        "

## 2021-06-16 NOTE — TELEPHONE ENCOUNTER
Reason for Call:  Medication or medication refill:    Do you use a [facility]?  Name of the pharmacy and phone number for the current request:   New pharmacy:  [facility]    Name of the medication requested:   Birth control    Other request: n/a    Can we leave a detailed message on this number? Yes    Phone number patient can be reached at: Cell number on file:    Telephone Information:   Mobile [phone]       Best Time: anytime    Call taken on 4/19/2021 at 11:51 AM by Bria Turner

## 2021-06-18 NOTE — PROGRESS NOTES
Assessment & Plan   1. Contraception:  No risk factors for use, refilled for one year.  She will return in August of this year for repeat pap.  Declines STD screening.    - JUNEL FE 1.5/30, 28, 1.5 mg-30 mcg (21)/75 mg (7) per tablet; Take 1 tablet by mouth daily.  Dispense: 3 Package; Refill: 3    2. Tonsillitis:  Discussed supportive cares for tonsilloliths, frequent gargling.  She is quite bothered by this and would like to discuss possible tonsillectomy. Referral to ENT for consult.    - Ambulatory referral to ENT    3. Tonsillolith  - Ambulatory referral to ENT    Tootie Hyatt CNP    Subjective   Chief Complaint:  Medication Refill (OCP's)    HPI:   Regina Hernandez is a 23 y.o. female who presents for medication fill and throat discomfort.    She is well. More settled in her role with [employer].     Contraception: On OCPs. Happy with this method, denies risk factors for use.  She is in a monogamous relationship, declines STD testing.  Will be due for repeat pap in August of this year after ASCUS on pap in August 2017.     Throat pain:  She states this occurs fairly frequently without other symptoms of illness.  Describes 'stones' in her throat. Sometimes so large she feels like she is choking.  She is quite bothered by the frequency of this and how large the stones are.  Pain resolves when expelled.  Typically no fever, lymphadenopathy.       Allergies:  has No Known Allergies.    SH/FH:  Social History and Family History reviewed and updated.   Tobacco Status:  She  reports that she has never smoked. She has never used smokeless tobacco.    Review of Systems:  A complete head to toe ROS is negative unless otherwise noted in HPI    Objective     Vitals:    06/21/18 0920   BP: 110/68   Patient Site: Right Arm   Patient Position: Sitting   Cuff Size: Adult Regular   Pulse: 83   SpO2: 98%   Weight: 144 lb 12 oz (65.7 kg)       Physical Exam:  GENERAL: Alert, well-appearing female  EYES: Conjunctiva pink,  sclera white, no exudates.  EARS: TMs pearly grey, no bulging, redness, retraction.   NOSE: Nares patent, no discharge.    MOUTH: Pharynx moist, pink without exudate. Tonsils 2+, craterous with several tonsilloliths present.    NECK: No lymphadenopathy.

## 2021-06-19 NOTE — PROGRESS NOTES
HPI: This patient is a 24yo F who presents for evaluation of the tonsils at the request of Tootie Hyatt CNP. There have been multiple sore throats throughout the last several years leading to missed work/school. She also has issues with multiply recurrent tonsil stones that cause discomfort. She tries to manage them with gargles and washes, but has been unsuccessful in making much improvement. Otherwise healthy and without fever, weight loss, odynophagia, dysphagia, hemoptysis, shortness of breath, or other major symptoms.    Past medical history, surgical history, social history, family history, medications, and allergies have been reviewed with the patient and are documented above.    Review of Systems: a 10-system review was performed. Pertinent positives are noted in the HPI and on a separate scanned document in the chart.    PHYSICAL EXAMINATION:  GEN: no acute distress, normocephalic  EYES: extraocular movements are intact, pupils are equal and round. Sclera clear.   EARS: auricles are normally formed. The external auditory canals are clear with minimal to no cerumen. Tympanic membranes are intact bilaterally with no signs of infection, effusion, retractions, or perforations.  NOSE: anterior nares are patent. No significant dorsal deviations.  OC/OP: clear, dentition is in good repair. The tongue and palate are fully mobile and symmetric. Tonsils are 2.5+, very cryptic with visible stone burden  NECK: soft and supple. No lymphadenopathy or masses. Airway is midline.  NEURO: CN VII and XII symmetric and strong. alert and oriented. No spontaneous nystagmus. Gait is normal.  PULM: breathing comfortably on room air, normal chest expansion with respiration  HEART: no cyanosis or clubbing, normal carotid pulses    MEDICAL DECISION-MAKING: Regina is a 24yo F with chronic tonsillitis and tonsil stones who would benefit from a tonsillectomy. The risks and benefits were discussed and the patient will schedule at their  convenience.

## 2021-06-19 NOTE — PROGRESS NOTES
Preoperative Exam    Scheduled Procedure: Tonsillectomy  Surgery Date:  8/27/18  Surgery Location: [facility], fax [phone]    Surgeon:  Dr. Torres    Assessment/Plan:     1. Encounter for preoperative examination for general surgical procedure  - Hemoglobin  - Pregnancy (Beta-hCG, Qual), Urine    2. Tonsillolith     Surgical Procedure Risk: Intermediate (reported cardiac risk generally 1-5%)  Have you had prior anesthesia?: Yes  Have you or any family members had a previous anesthesia reaction:  No  Do you or any family members have a history of a clotting or bleeding disorder?: No  Cardiac Risk Assessment: no increased risk for major cardiac complications    Patient approved for surgery with general or local anesthesia.    Please Note:  No additional special considerations    Functional Status: Independent  Patient plans to recover at home with family.     Subjective:      Regina Hernandez is a 23 y.o. female who presents for a preoperative consultation.      She has been bothered by tonsilliths for many years.  Consulted with ENT and planning for tonsillectomy.      PMH notable for migraine, tension HA, otherwise negative.     All other systems reviewed and are negative, other than those listed in the HPI.    Pertinent History  Do you have difficulty breathing or chest pain after walking up a flight of stairs: No  History of obstructive sleep apnea: No  Steroid use in the last 6 months: No  Frequent Aspirin/NSAID use: No  Prior Blood Transfusion: No  Prior Blood Transfusion Reaction: No  If for some reason prior to, during or after the procedure, if it is medically indicated, would you be willing to have a blood transfusion?:  There is no transfusion refusal.    Current Outpatient Prescriptions   Medication Sig Dispense Refill     JUNEL FE 1.5/30, 28, 1.5 mg-30 mcg (21)/75 mg (7) per tablet Take 1 tablet by mouth daily. 3 Package 3     No current facility-administered medications for this visit.          No Known Allergies    There is no problem list on file for this patient.      No past medical history on file.    No past surgical history on file.    Social History     Social History     Marital status: Single     Spouse name: N/A     Number of children: N/A     Years of education: N/A     Occupational History     Not on file.     Social History Main Topics     Smoking status: Never Smoker     Smokeless tobacco: Never Used     Alcohol use Not on file     Drug use: Not on file     Sexual activity: Not on file     Other Topics Concern     Not on file     Social History Narrative       Patient Care Team:  Tootie Hyatt CNP as PCP - General (Nurse Practitioner)          Objective:     There were no vitals filed for this visit.      Physical Exam:  GENERAL: Alert, well appearing  PSYCH: Pleasant mood, affect appropriate.   SKIN: No atypical lesions  EYES: Conjunctiva pink, sclera white, no exudates. LELE.  EOMs intact. Corneal light reflex bilaterally, red reflex present.Undilated fundoscopic exam normal  EARS: TMs pearly grey, no bulging, redness, retraction.  MOUTH: Pharynx moist, pink without exudate. No tonsillar enlargement  NECK: No lymphadenopathy. Thyroid borders smooth without enlargement, nodules.   CV: Regular rate and rhythm without murmurs, rubs or gallops.  RESP: Lung sounds clear  ABDOMEN: BS+. Abdomen soft, nontender to palpation without guarding. No organomegaly  PV : No edema  NEURO: Alert, oriented       There are no Patient Instructions on file for this visit.    EKG:  Not indicated    Labs:  Recent Results (from the past 24 hour(s))   Hemoglobin    Collection Time: 08/15/18 12:01 PM   Result Value Ref Range    Hemoglobin 12.5 12.0 - 16.0 g/dL   Pregnancy (Beta-hCG, Qual), Urine    Collection Time: 08/15/18 12:08 PM   Result Value Ref Range    Pregnancy Test, Urine Negative Negative    Specific Gravity, UA 1.025 1.001 - 1.030       Immunization History   Administered Date(s) Administered      Dtap [birth year], 04/25/1996, 04/30/1999     HPV Quadrivalent 04/16/2007, 06/18/2007, 11/05/2007     Hep B, historic 1995, 1995, 01/30/1996     Hepatitis A, Ped/Adol 2 Dose IM (18yr & under) 04/16/2007     Hepatitis A, Peds, Unspecified 04/20/2009     Influenza,seasonal quad, PF, 36+MOS 12/24/2015, 12/12/2016, 08/28/2017     MMR 04/25/1996, 04/16/2007     Meningococcal MCV4O 06/28/2013     Meningococcal MCV4P 08/21/2009     POLIO, Unspecified 1995, 1995, 04/25/1996, 04/30/1999     Tdap 04/16/2007, 12/12/2016     Typhoid, Inj, Inactive 12/12/2016     Varicella 11/18/1999, 04/16/2007           Electronically signed by Tootie Hyatt CNP 08/15/18 11:30 AM

## 2021-06-20 NOTE — ANESTHESIA POSTPROCEDURE EVALUATION
Patient: Regina Hernandez  BILATERAL TONSILLECTOMY  Anesthesia type: general    Patient location: PACU  Last vitals:   Vitals:    08/27/18 1300   BP: 119/76   Pulse: 73   Resp:    Temp:    SpO2: 98%     Post vital signs: stable  Level of consciousness: awake and responds to simple questions  Post-anesthesia pain: pain controlled  Post-anesthesia nausea and vomiting: no  Pulmonary: unassisted, return to baseline  Cardiovascular: stable and blood pressure at baseline  Hydration: adequate  Anesthetic events: no    QCDR Measures:  ASA# 11 - Uzma-op Cardiac Arrest: ASA11B - Patient did NOT experience unanticipated cardiac arrest  ASA# 12 - Uzma-op Mortality Rate: ASA12B - Patient did NOT die  ASA# 13 - PACU Re-Intubation Rate: ASA13B - Patient did NOT require a new airway mgmt  ASA# 10 - Composite Anes Safety: ASA10A - No serious adverse event    Additional Notes: The patient has post-op myalgias secondary to succinylcholine. I explained to her that it is normal to feel muscle aches after receiving succinylcholine and it will resolve over the next day, or two. I explained it is much like a strenuous workout at the gym and the muscle aches will resolve much like they do after a workout.    Dino Jeffries MD

## 2021-06-20 NOTE — ANESTHESIA CARE TRANSFER NOTE
Last vitals:   Vitals:    08/27/18 1055   BP: 116/59   Pulse: (!) 113   Resp: 16   Temp: 36.8  C (98.2  F)   SpO2: 100%     Patient's level of consciousness is drowsy  Spontaneous respirations: yes  Maintains airway independently: yes  Dentition unchanged: yes  Oropharynx: oropharynx clear of all foreign objects    QCDR Measures:  ASA# 20 - Surgical Safety Checklist: WHO surgical safety checklist completed prior to induction  PQRS# 430 - Adult PONV Prevention: 4558F - Pt received => 2 anti-emetic agents (different classes) preop & intraop  ASA# 8 - Peds PONV Prevention: NA - Not pediatric patient, not GA or 2 or more risk factors NOT present  PQRS# 424 - Uzma-op Temp Management: 4559F - At least one body temp DOCUMENTED => 35.5C or 95.9F within required timeframe  PQRS# 426 - PACU Transfer Protocol: - Transfer of care checklist used  ASA# 14 - Acute Post-op Pain: ASA14B - Patient did NOT experience pain >= 7 out of 10

## 2021-06-20 NOTE — PROGRESS NOTES
HPI: This patient is a 24yo F who presents for a post-op visit s/p tonsillectomy. Doing well overall. Has returned to normal activity and normal diet. No issues with infections. Says her throat is already feeling much better than it did prior to surgery    PHYSICAL EXAMINATION:  GEN: no acute distress, normocephalic  OC/OP: clear, dentition is appropriate for age. The tongue and palate are fully mobile and symmetric. The tonsillar fossae are well-healed.  NECK: soft and supple. No lymphadenopathy or masses. Airway is midline.  PULM: breathing comfortably on room air, normal chest expansion with respiration    MEDICAL DECISION-MAKING: doing well s/p tonsillectomy. RTC PRN

## 2021-06-20 NOTE — ANESTHESIA PREPROCEDURE EVALUATION
Anesthesia Evaluation      Patient summary reviewed   No history of anesthetic complications     Airway   Mallampati: II  Neck ROM: full   Pulmonary - negative ROS and normal exam                          Cardiovascular - negative ROS and normal exam   Neuro/Psych      Comments: Migraine HA's  Tension HA's    Endo/Other - negative ROS      GI/Hepatic/Renal - negative ROS      Other findings:       Tonsillolith    Hgb 12.5  PREGNANCY: negative      Dental - normal exam                        Anesthesia Plan  Planned anesthetic: general endotracheal  FIO2 < 30% for fire risk  Scopolamine patch  Zofran/Decadron  ASA 1   Induction: intravenous   Anesthetic plan and risks discussed with: patient and parent/guardian  Anesthesia plan special considerations: antiemetics,   Post-op plan: routine recovery

## 2021-09-07 ENCOUNTER — OFFICE VISIT (OUTPATIENT)
Dept: URGENT CARE | Facility: URGENT CARE | Age: 26
End: 2021-09-07
Payer: COMMERCIAL

## 2021-09-07 VITALS
HEIGHT: 64 IN | OXYGEN SATURATION: 99 % | WEIGHT: 150 LBS | BODY MASS INDEX: 25.61 KG/M2 | SYSTOLIC BLOOD PRESSURE: 119 MMHG | HEART RATE: 77 BPM | TEMPERATURE: 98.4 F | DIASTOLIC BLOOD PRESSURE: 68 MMHG

## 2021-09-07 DIAGNOSIS — K92.1 BLOOD IN STOOL: ICD-10-CM

## 2021-09-07 DIAGNOSIS — R10.9 STOMACH CRAMPS: ICD-10-CM

## 2021-09-07 DIAGNOSIS — R11.10 VOMITING AND DIARRHEA: Primary | ICD-10-CM

## 2021-09-07 DIAGNOSIS — N39.0 URINARY TRACT INFECTION WITHOUT HEMATURIA, SITE UNSPECIFIED: ICD-10-CM

## 2021-09-07 DIAGNOSIS — R19.7 VOMITING AND DIARRHEA: Primary | ICD-10-CM

## 2021-09-07 LAB
ALBUMIN UR-MCNC: NEGATIVE MG/DL
APPEARANCE UR: CLEAR
BACTERIA #/AREA URNS HPF: ABNORMAL /HPF
BASOPHILS # BLD AUTO: 0.1 10E3/UL (ref 0–0.2)
BASOPHILS NFR BLD AUTO: 1 %
BILIRUB UR QL STRIP: NEGATIVE
COLOR UR AUTO: YELLOW
EOSINOPHIL # BLD AUTO: 0.1 10E3/UL (ref 0–0.7)
EOSINOPHIL NFR BLD AUTO: 1 %
ERYTHROCYTE [DISTWIDTH] IN BLOOD BY AUTOMATED COUNT: 12.6 % (ref 10–15)
GLUCOSE UR STRIP-MCNC: NEGATIVE MG/DL
HCT VFR BLD AUTO: 38.6 % (ref 35–47)
HGB BLD-MCNC: 12.8 G/DL (ref 11.7–15.7)
HGB UR QL STRIP: ABNORMAL
IMM GRANULOCYTES # BLD: 0 10E3/UL
IMM GRANULOCYTES NFR BLD: 0 %
KETONES UR STRIP-MCNC: NEGATIVE MG/DL
LEUKOCYTE ESTERASE UR QL STRIP: ABNORMAL
LYMPHOCYTES # BLD AUTO: 2.5 10E3/UL (ref 0.8–5.3)
LYMPHOCYTES NFR BLD AUTO: 43 %
MCH RBC QN AUTO: 28.4 PG (ref 26.5–33)
MCHC RBC AUTO-ENTMCNC: 33.2 G/DL (ref 31.5–36.5)
MCV RBC AUTO: 86 FL (ref 78–100)
MONOCYTES # BLD AUTO: 0.5 10E3/UL (ref 0–1.3)
MONOCYTES NFR BLD AUTO: 8 %
NEUTROPHILS # BLD AUTO: 2.7 10E3/UL (ref 1.6–8.3)
NEUTROPHILS NFR BLD AUTO: 47 %
NITRATE UR QL: NEGATIVE
PH UR STRIP: 7 [PH] (ref 5–7)
PLATELET # BLD AUTO: 290 10E3/UL (ref 150–450)
RBC # BLD AUTO: 4.51 10E6/UL (ref 3.8–5.2)
RBC #/AREA URNS AUTO: ABNORMAL /HPF
SP GR UR STRIP: 1.02 (ref 1–1.03)
UROBILINOGEN UR STRIP-ACNC: 0.2 E.U./DL
WBC # BLD AUTO: 5.7 10E3/UL (ref 4–11)
WBC #/AREA URNS AUTO: ABNORMAL /HPF

## 2021-09-07 PROCEDURE — 36415 COLL VENOUS BLD VENIPUNCTURE: CPT | Performed by: FAMILY MEDICINE

## 2021-09-07 PROCEDURE — 99214 OFFICE O/P EST MOD 30 MIN: CPT | Performed by: FAMILY MEDICINE

## 2021-09-07 PROCEDURE — 85025 COMPLETE CBC W/AUTO DIFF WBC: CPT | Performed by: FAMILY MEDICINE

## 2021-09-07 PROCEDURE — 81001 URINALYSIS AUTO W/SCOPE: CPT | Performed by: FAMILY MEDICINE

## 2021-09-07 PROCEDURE — 87086 URINE CULTURE/COLONY COUNT: CPT | Performed by: FAMILY MEDICINE

## 2021-09-07 RX ORDER — NITROFURANTOIN 25; 75 MG/1; MG/1
100 CAPSULE ORAL 2 TIMES DAILY
Qty: 14 CAPSULE | Refills: 0 | Status: SHIPPED | OUTPATIENT
Start: 2021-09-07 | End: 2021-09-14

## 2021-09-07 ASSESSMENT — MIFFLIN-ST. JEOR: SCORE: 1405.4
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
## 2021-09-07 NOTE — PATIENT INSTRUCTIONS
Patient Education     Treating Diarrhea  Diarrhea happens when you have loose, watery, or frequent bowel movements. It is a common problem with many causes. Most cases of diarrhea clear up on their own. But certain cases may need treatment. Be sure to see your healthcare provider if your symptoms don't get better in a few days.   Getting relief  Treatment of diarrhea depends on its cause. Diarrhea caused by bacterial or parasite infection is often treated with antibiotics. Diarrhea caused by other factors, such as a stomach virus, often improves with simple home treatment. The tips below may also help ease your symptoms.       Drink plenty of fluids. This helps prevent too much fluid loss (dehydration). Water, clear soups, and electrolyte solutions are good choices. Don't take alcohol, coffee, tea, or milk. These can irritate your intestines and make symptoms worse.    Suck on ice chips if drinking makes you queasy.    Return to your normal diet slowly. You may want to eat bland foods at first, such as rice and toast. Also, you may need to stay away from certain foods for a while, such as dairy products. These can make symptoms worse. Ask your healthcare provider if there are any other foods you should stay away from.    If you were prescribed antibiotics, take them as directed.    Don't take anti-diarrhea medicines without asking your provider first.  Call your healthcare provider   Call your healthcare provider if you have any of the following:      A fever of 100.4  F ( 38.0 C) or higher, or as directed by your provider    Chills    Severe pain    Worsening diarrhea or diarrhea for more than 2 days    Bloody vomit or stool    Signs of dehydration (dizziness, dry mouth and tongue, rapid pulse, dark urine)  [employer] last reviewed this educational content on 6/1/2019 2000-2021 [employer]. All rights reserved. This information is not intended as a substitute for professional medical care. Always  follow your healthcare professional's instructions.

## 2021-09-08 ENCOUNTER — LAB (OUTPATIENT)
Dept: LAB | Facility: CLINIC | Age: 26
End: 2021-09-08
Payer: COMMERCIAL

## 2021-09-08 DIAGNOSIS — R19.7 VOMITING AND DIARRHEA: ICD-10-CM

## 2021-09-08 DIAGNOSIS — R11.10 VOMITING AND DIARRHEA: ICD-10-CM

## 2021-09-08 LAB
BACTERIA UR CULT: NORMAL
C COLI+JEJUNI+LARI FUSA STL QL NAA+PROBE: NOT DETECTED
EC STX1 GENE STL QL NAA+PROBE: NOT DETECTED
EC STX2 GENE STL QL NAA+PROBE: NOT DETECTED
NOROV GI+II ORF1-ORF2 JNC STL QL NAA+PR: NOT DETECTED
RVA NSP5 STL QL NAA+PROBE: NOT DETECTED
SALMONELLA SP RPOD STL QL NAA+PROBE: NOT DETECTED
SHIGELLA SP+EIEC IPAH STL QL NAA+PROBE: NOT DETECTED
V CHOL+PARA RFBL+TRKH+TNAA STL QL NAA+PR: NOT DETECTED
Y ENTERO RECN STL QL NAA+PROBE: NOT DETECTED

## 2021-09-08 PROCEDURE — 87209 SMEAR COMPLEX STAIN: CPT

## 2021-09-08 PROCEDURE — 87177 OVA AND PARASITES SMEARS: CPT

## 2021-09-08 PROCEDURE — 87506 IADNA-DNA/RNA PROBE TQ 6-11: CPT

## 2021-09-09 LAB — O+P STL MICRO: NEGATIVE

## 2021-09-18 ENCOUNTER — HEALTH MAINTENANCE LETTER (OUTPATIENT)
Age: 26
End: 2021-09-18

## 2021-10-06 ENCOUNTER — OFFICE VISIT (OUTPATIENT)
Dept: URGENT CARE | Facility: URGENT CARE | Age: 26
End: 2021-10-06
Payer: COMMERCIAL

## 2021-10-06 VITALS
SYSTOLIC BLOOD PRESSURE: 108 MMHG | BODY MASS INDEX: 25.61 KG/M2 | HEIGHT: 64 IN | WEIGHT: 150 LBS | DIASTOLIC BLOOD PRESSURE: 65 MMHG | HEART RATE: 80 BPM | TEMPERATURE: 98.2 F | OXYGEN SATURATION: 97 % | RESPIRATION RATE: 20 BRPM

## 2021-10-06 DIAGNOSIS — Z30.41 ENCOUNTER FOR SURVEILLANCE OF CONTRACEPTIVE PILLS: ICD-10-CM

## 2021-10-06 DIAGNOSIS — Z00.00 ENCOUNTER FOR ROUTINE ADULT HEALTH EXAMINATION WITHOUT ABNORMAL FINDINGS: ICD-10-CM

## 2021-10-06 DIAGNOSIS — S61.215A LACERATION OF LEFT RING FINGER WITHOUT FOREIGN BODY WITHOUT DAMAGE TO NAIL, INITIAL ENCOUNTER: Primary | ICD-10-CM

## 2021-10-06 PROCEDURE — 12001 RPR S/N/AX/GEN/TRNK 2.5CM/<: CPT | Performed by: INTERNAL MEDICINE

## 2021-10-06 PROCEDURE — 90471 IMMUNIZATION ADMIN: CPT | Performed by: INTERNAL MEDICINE

## 2021-10-06 PROCEDURE — 90715 TDAP VACCINE 7 YRS/> IM: CPT | Performed by: INTERNAL MEDICINE

## 2021-10-06 ASSESSMENT — MIFFLIN-ST. JEOR: SCORE: 1405.4

## 2021-10-06 NOTE — PATIENT INSTRUCTIONS
Patient Education     Laceration of an Arm or Leg: Stitches, Staples, or Tape   A laceration is a cut through the skin. If it's deep or it's gaping open, it may require stitches or staples to close so it can heal. Minor cuts may be treated with surgical tape closures, or skin glue.   X-rays may be done if something may have entered the skin through the cut. You may also need a tetanus shot if you are not up to date on this vaccine.   Home care    Follow the healthcare provider s instructions on how to care for the cut.    Wash your hands with soap and clean, running water before and after caring for your wound. This is to help prevent infection.    Keep the wound clean and dry. If a bandage was applied and it becomes wet or dirty, replace it. Otherwise, leave it in place for the first 24 hours, then change it once a day or as directed.    If stitches or staples were used, clean the wound daily:  ? After removing the bandage, wash the area with soap and water. Use a wet cotton swab to loosen and remove any blood or crust that forms.  ? After cleaning, keep the wound clean and dry. Talk with your healthcare provider before putting any antibiotic ointment on the wound. Reapply the bandage.    Remove the bandage to shower as usual after the first 24 hours, but don't soak the area in water (no swimming) until the stitches or staples are removed.    If surgical tape closures were used, keep the area clean and dry. If it becomes wet, blot it dry with a towel. Let the surgical tape fall off on its own.    Follow the healthcare provider's instructions for any medicines prescribed.  ? The provider may prescribe an antibiotic cream or ointment to prevent infection. He or she may also prescribe an antibiotic pill. Don't stop taking this medicine until you have finished it all or the provider tells you to stop.  ? The provider may also prescribe medicine for pain. Follow the instructions exactly for how to take these  medicines.    Don't do activities that may reopen your wound.    Follow-up care  Follow up with your healthcare provider, or as advised. Most skin wounds heal within 10 days. But an infection may sometimes occur even with proper treatment. Check the wound daily for the signs of infection listed below. Stitches and staples should be removed within 7 to14 days. If surgical tape closures were used, you may remove them after 10 days if they have not fallen off by then.    When to seek medical advice  Call your healthcare provider right away if any of these occur:    Wound bleeding not controlled by direct pressure    Signs of infection, including increasing pain in the wound, increasing wound redness or swelling, or pus or bad odor coming from the wound    Chills, fever of 100.4 F (38 C) or higher, or as directed by your healthcare provider    Stitches or staples coming apart or falling out or surgical tape falling off before 7 days    Wound edges reopening    Color changes in the wound    Numbness around the wound     Decreased movement around the injured area  [employer] last reviewed this educational content on 6/1/2020 2000-2021 [employer]. All rights reserved. This information is not intended as a substitute for professional medical care. Always follow your healthcare professional's instructions.

## 2021-10-06 NOTE — PROGRESS NOTES
"SUBJECTIVE:     Chief Complaint   Patient presents with     Urgent Care     Laceration      cut Left ring finger while making lunch, last Tdap was in 2007 (Given today)     Regina Hernandez is a 26 year old female who presents to the clinic with a laceration on the left finger sustained few hour(s) ago.  This is a non-work related injury.    Mechanism of injury: knife.    Associated symptoms: Denies numbness, weakness, or loss of function  Last tetanus booster within 10 years: no    EXAM:   The patient appears today in alert,no apparent distress distress  VITALS: /65   Pulse 80   Temp 98.2  F (36.8  C) (Oral)   Resp 20   Ht 1.626 m (5' 4\")   Wt 68 kg (150 lb)   SpO2 97%   BMI 25.75 kg/m      Size of laceration: 1 centimeters  Characteristics of the laceration: bleeding- mild and longitudinal  Tendon function intact: yes  Sensation to light touch intact: yes  Picture included in patient's chart:             Assessment:  Data Unavailable    PLAN:  PROCEDURE NOTE::  LET was applied.  Digital/regional block applied using a total of 2 cc's of Lidocaine 1% plain  Wound cleaned with HIBICLENS  Wound soaked  Laceration was closed using 3 5-0 sutures interrupted sutures  After care instructions:  Keep wound clean and dry for the next 24-48 hours  Sutures out in 7-10 days  Signs of infection discussed today  Apply anti-bacterial ointment for 2 day  May return to work as long as wound is kept clean and dry    "

## 2021-10-06 NOTE — TELEPHONE ENCOUNTER
"  Disp Refills Start End CYNTHIA    BLISOVI FE 1.5/30, 28, 1.5 mg-30 mcg (21)/75 mg (7) per tablet 112 tablet 0 4/20/2021  Yes   Sig - Route: Take 1 tablet by mouth daily. - Oral   Sent to pharmacy as: Blisovi Fe 1.5/30 (28) 1.5 mg-30 mcg (21)/75 mg (7) tablet   Notes to Pharmacy: Take 1 active pill daily. Needs to be seen   E-Prescribing Status: Receipt confirmed by pharmacy (4/20/2021  9:27 PM CDT)       BLISOVI FE 1.5/30, 28, 1.5 mg-30 mcg (21)/75 mg (7) per tablet [[id number]]    Electronically signed by: Tootie Hyatt CNP on 04/20/21 2127 Status: Active   Ordering user: Tootie Hyatt CNP 04/20/21 2127 Authorized by: Tootie Hyatt CNP   Frequency: DAILY 04/20/21 - Until Discontinued Released by: Tootie Hyatt CNP 04/20/21 2127   Diagnoses  Uses birth control [Z78.9]   Medication comments: Take 1 active pill daily. Needs to be seen       Former patient of [facility] & has not established care with another provider.  Please assign refill request to covering provider per clinic standard process.    Routing refill request to provider for review/approval because:  No pcp  Patient needs to be seen because it has been more than 1 year since last office visit.    Last Written Prescription Date:  4/20/21  Last Fill Quantity: 112,  # refills: 0   Last office visit provider:  10/12/20     Requested Prescriptions   Pending Prescriptions Disp Refills     JUNEL FE 1.5/30 1.5-30 MG-MCG tablet [Pharmacy Med Name: JUNEL FE 1.5 MG-30 MCG TABLET] 112 tablet      Sig: TAKE 1 TABLET BY MOUTH DAILY. ACTIVE TABLETS ONLY.       Contraceptives Protocol Failed - 10/6/2021 12:29 AM        Failed - Recent (12 mo) or future (30 days) visit within the authorizing provider's specialty     Patient has had an office visit with the authorizing provider or a provider within the authorizing providers department within the previous 12 mos or has a future within next 30 days. See \"Patient Info\" tab in inbasket, or \"Choose Columns\" in Meds & Orders " section of the refill encounter.              Passed - Patient is not a current smoker if age is 35 or older        Passed - Medication is active on med list        Passed - No active pregnancy on record        Passed - No positive pregnancy test in past 12 months             Zia Gilmore RN 10/06/21 8:12 AM

## 2021-10-13 RX ORDER — NORETHINDRONE ACETATE AND ETHINYL ESTRADIOL AND FERROUS FUMARATE 1.5-30(21)
KIT ORAL
Qty: 112 TABLET | Refills: 4 | Status: SHIPPED | OUTPATIENT
Start: 2021-10-13 | End: 2022-01-25

## 2022-01-25 ENCOUNTER — OFFICE VISIT (OUTPATIENT)
Dept: MIDWIFE SERVICES | Facility: CLINIC | Age: 27
End: 2022-01-25
Payer: COMMERCIAL

## 2022-01-25 VITALS
BODY MASS INDEX: 26.69 KG/M2 | TEMPERATURE: 97.8 F | OXYGEN SATURATION: 99 % | SYSTOLIC BLOOD PRESSURE: 127 MMHG | HEART RATE: 94 BPM | DIASTOLIC BLOOD PRESSURE: 69 MMHG | WEIGHT: 155.5 LBS

## 2022-01-25 DIAGNOSIS — N91.1 SECONDARY AMENORRHEA: Primary | ICD-10-CM

## 2022-01-25 LAB
HCG UR QL: NEGATIVE
PROLACTIN SERPL-MCNC: 20 UG/L (ref 3–27)

## 2022-01-25 PROCEDURE — 36415 COLL VENOUS BLD VENIPUNCTURE: CPT | Performed by: ADVANCED PRACTICE MIDWIFE

## 2022-01-25 PROCEDURE — 84443 ASSAY THYROID STIM HORMONE: CPT | Performed by: ADVANCED PRACTICE MIDWIFE

## 2022-01-25 PROCEDURE — 81025 URINE PREGNANCY TEST: CPT | Performed by: ADVANCED PRACTICE MIDWIFE

## 2022-01-25 PROCEDURE — 99213 OFFICE O/P EST LOW 20 MIN: CPT | Performed by: ADVANCED PRACTICE MIDWIFE

## 2022-01-25 PROCEDURE — 84146 ASSAY OF PROLACTIN: CPT | Performed by: ADVANCED PRACTICE MIDWIFE

## 2022-01-25 RX ORDER — MEDROXYPROGESTERONE ACETATE 10 MG
10 TABLET ORAL DAILY
Qty: 10 TABLET | Refills: 0 | Status: SHIPPED | OUTPATIENT
Start: 2022-01-25 | End: 2022-02-04
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
## 2022-01-26 LAB — TSH SERPL DL<=0.005 MIU/L-ACNC: 1.39 MU/L (ref 0.4–4)

## 2022-03-16 ENCOUNTER — MYC MEDICAL ADVICE (OUTPATIENT)
Dept: MIDWIFE SERVICES | Facility: CLINIC | Age: 27
End: 2022-03-16
Payer: COMMERCIAL

## 2022-03-16 DIAGNOSIS — N91.1 SECONDARY AMENORRHEA: Primary | ICD-10-CM

## 2022-03-19 NOTE — TELEPHONE ENCOUNTER
Infertility labs ordered for 3rd day of next menstrual cycle. Recommend an appt with Dr. Harrington for infertility work-up.   Vasiliy Monroy CNM

## 2022-04-06 ENCOUNTER — LAB (OUTPATIENT)
Dept: LAB | Facility: CLINIC | Age: 27
End: 2022-04-06
Payer: COMMERCIAL

## 2022-04-06 DIAGNOSIS — N91.1 SECONDARY AMENORRHEA: ICD-10-CM

## 2022-04-06 LAB
ESTRADIOL SERPL-MCNC: 13 PG/ML
FSH SERPL-ACNC: 4.9 MIU/ML
HBA1C MFR BLD: 5.2 % (ref 0–5.6)

## 2022-04-06 PROCEDURE — 83036 HEMOGLOBIN GLYCOSYLATED A1C: CPT

## 2022-04-06 PROCEDURE — 82670 ASSAY OF TOTAL ESTRADIOL: CPT

## 2022-04-06 PROCEDURE — 83001 ASSAY OF GONADOTROPIN (FSH): CPT

## 2022-04-06 PROCEDURE — 99000 SPECIMEN HANDLING OFFICE-LAB: CPT

## 2022-04-06 PROCEDURE — 83520 IMMUNOASSAY QUANT NOS NONAB: CPT | Mod: 90

## 2022-04-06 PROCEDURE — 36415 COLL VENOUS BLD VENIPUNCTURE: CPT

## 2022-04-09 LAB — MIS SERPL-MCNC: 6.7 NG/ML

## 2022-04-15 ENCOUNTER — OFFICE VISIT (OUTPATIENT)
Dept: OBGYN | Facility: CLINIC | Age: 27
End: 2022-04-15
Payer: COMMERCIAL

## 2022-04-15 VITALS
BODY MASS INDEX: 25.44 KG/M2 | HEART RATE: 83 BPM | WEIGHT: 149 LBS | HEIGHT: 64 IN | DIASTOLIC BLOOD PRESSURE: 61 MMHG | SYSTOLIC BLOOD PRESSURE: 118 MMHG | OXYGEN SATURATION: 100 %

## 2022-04-15 DIAGNOSIS — Z31.69 ENCOUNTER FOR PRECONCEPTION CONSULTATION: Primary | ICD-10-CM

## 2022-04-15 PROCEDURE — 99214 OFFICE O/P EST MOD 30 MIN: CPT | Performed by: OBSTETRICS & GYNECOLOGY

## 2022-04-15 NOTE — PATIENT INSTRUCTIONS
Lifestyle recommendations when attempting pregnancy    Limit caffeine less than 300 mg/day  Alcohol less than 2 drinks per day  Exercise is fine, regular and moderate  Take multivitamin or prenatal vitamin daily  (Folic Acid 400 mcg per day)    Would expect next menses by 5/8 if you are ovulating. So if not happening, then me know.     By august if not pregnant, then  Cycle instructions:    The first day of bleeding/period is called Day 1.    Start testing for ovulation using the store bought ovulation predictor kits on Day 11 of cycle.  When you get a positive surge, you will most likely be ovulating within the next 24 hours.       Test the urine about the same time each day.  (should try to test between 2-4 pm, empty bladder about noon)         The test is positive when you see 2 dark solid lines or smiley face  (one faint line and one dark line is NOT positive)       Once the test is positive, you can stop testing.  Have sex/intercourse the day the test is positive.  The next day, have sex again.    If you don't have a period by 15-16 days after LH kit is positive (surge) then do urine pregnancy test and call clinic if positive.      Basic info:  The ovulation predictor kits are found in the condom/tampon section of the store.  Clear blue easy brand is simple to read.  Most women will get a positive LH surge before day 20 of the cycle.    Do not use lubercants with intercourse when trying to get pregnant.    Let me know if no positive surge seen
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
## 2022-05-31 ENCOUNTER — PRENATAL OFFICE VISIT (OUTPATIENT)
Dept: NURSING | Facility: CLINIC | Age: 27
End: 2022-05-31
Payer: COMMERCIAL

## 2022-05-31 VITALS — HEIGHT: 64 IN | BODY MASS INDEX: 25.58 KG/M2

## 2022-05-31 DIAGNOSIS — Z34.91 ENCOUNTER FOR SUPERVISION OF NORMAL PREGNANCY IN FIRST TRIMESTER: Primary | ICD-10-CM

## 2022-05-31 DIAGNOSIS — Z23 NEED FOR TDAP VACCINATION: ICD-10-CM

## 2022-05-31 PROBLEM — R87.619 ABNORMAL CERVICAL PAPANICOLAOU SMEAR: Status: ACTIVE | Noted: 2017-09-22

## 2022-05-31 PROCEDURE — 99207 PR NO CHARGE NURSE ONLY: CPT

## 2022-05-31 RX ORDER — CLINDAMYCIN PHOSPHATE 10 UG/ML
LOTION TOPICAL
Status: ON HOLD | COMMUNITY
Start: 2022-01-31 | End: 2023-05-08
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
## 2022-06-05 ENCOUNTER — HOSPITAL ENCOUNTER (EMERGENCY)
Facility: CLINIC | Age: 27
Discharge: HOME | End: 2022-06-06
Attending: EMERGENCY MEDICINE | Admitting: EMERGENCY MEDICINE
Payer: COMMERCIAL

## 2022-06-05 ENCOUNTER — NURSE TRIAGE (OUTPATIENT)
Dept: NURSING | Facility: CLINIC | Age: 27
End: 2022-06-05
Payer: COMMERCIAL

## 2022-06-05 ENCOUNTER — APPOINTMENT (OUTPATIENT)
Dept: ULTRASOUND IMAGING | Facility: CLINIC | Age: 27
End: 2022-06-05
Attending: EMERGENCY MEDICINE
Payer: COMMERCIAL

## 2022-06-05 VITALS
WEIGHT: 148 LBS | OXYGEN SATURATION: 100 % | RESPIRATION RATE: 16 BRPM | BODY MASS INDEX: 25.27 KG/M2 | TEMPERATURE: 98.3 F | DIASTOLIC BLOOD PRESSURE: 61 MMHG | HEART RATE: 91 BPM | HEIGHT: 64 IN | SYSTOLIC BLOOD PRESSURE: 119 MMHG

## 2022-06-05 DIAGNOSIS — R31.9 URINARY TRACT INFECTION WITH HEMATURIA, SITE UNSPECIFIED: ICD-10-CM

## 2022-06-05 DIAGNOSIS — N39.0 URINARY TRACT INFECTION WITH HEMATURIA, SITE UNSPECIFIED: ICD-10-CM

## 2022-06-05 DIAGNOSIS — O20.9 VAGINAL BLEEDING IN PREGNANCY, FIRST TRIMESTER: ICD-10-CM

## 2022-06-05 DIAGNOSIS — Z34.91 ENCOUNTER FOR SUPERVISION OF NORMAL PREGNANCY IN FIRST TRIMESTER: ICD-10-CM

## 2022-06-05 LAB
ABO/RH(D): NORMAL
ABO/RH(D): NORMAL
ALBUMIN SERPL-MCNC: 4 G/DL (ref 3.4–5)
ALBUMIN UR-MCNC: NEGATIVE MG/DL
ALP SERPL-CCNC: 77 U/L (ref 40–150)
ALT SERPL W P-5'-P-CCNC: 22 U/L (ref 0–50)
ANION GAP SERPL CALCULATED.3IONS-SCNC: 6 MMOL/L (ref 3–14)
ANTIBODY SCREEN: NEGATIVE
APPEARANCE UR: CLEAR
AST SERPL W P-5'-P-CCNC: 9 U/L (ref 0–45)
B-HCG SERPL-ACNC: 665 IU/L (ref 0–5)
BASOPHILS # BLD AUTO: 0.1 10E3/UL (ref 0–0.2)
BASOPHILS NFR BLD AUTO: 1 %
BILIRUB SERPL-MCNC: 0.5 MG/DL (ref 0.2–1.3)
BILIRUB UR QL STRIP: NEGATIVE
BUN SERPL-MCNC: 12 MG/DL (ref 7–30)
CALCIUM SERPL-MCNC: 9 MG/DL (ref 8.5–10.1)
CHLORIDE BLD-SCNC: 108 MMOL/L (ref 94–109)
CLUE CELLS: ABNORMAL
CO2 SERPL-SCNC: 26 MMOL/L (ref 20–32)
COLOR UR AUTO: ABNORMAL
CREAT SERPL-MCNC: 0.69 MG/DL (ref 0.52–1.04)
EOSINOPHIL # BLD AUTO: 0.1 10E3/UL (ref 0–0.7)
EOSINOPHIL NFR BLD AUTO: 1 %
ERYTHROCYTE [DISTWIDTH] IN BLOOD BY AUTOMATED COUNT: 13 % (ref 10–15)
GFR SERPL CREATININE-BSD FRML MDRD: >90 ML/MIN/1.73M2
GLUCOSE BLD-MCNC: 91 MG/DL (ref 70–99)
GLUCOSE UR STRIP-MCNC: NEGATIVE MG/DL
HCT VFR BLD AUTO: 37 % (ref 35–47)
HGB BLD-MCNC: 12.2 G/DL (ref 11.7–15.7)
HGB UR QL STRIP: ABNORMAL
HOLD SPECIMEN: NORMAL
IMM GRANULOCYTES # BLD: 0 10E3/UL
IMM GRANULOCYTES NFR BLD: 0 %
INR PPP: 1.11 (ref 0.85–1.15)
KETONES UR STRIP-MCNC: NEGATIVE MG/DL
LEUKOCYTE ESTERASE UR QL STRIP: ABNORMAL
LYMPHOCYTES # BLD AUTO: 3.4 10E3/UL (ref 0.8–5.3)
LYMPHOCYTES NFR BLD AUTO: 30 %
MCH RBC QN AUTO: 28.7 PG (ref 26.5–33)
MCHC RBC AUTO-ENTMCNC: 33 G/DL (ref 31.5–36.5)
MCV RBC AUTO: 87 FL (ref 78–100)
MONOCYTES # BLD AUTO: 0.9 10E3/UL (ref 0–1.3)
MONOCYTES NFR BLD AUTO: 8 %
NEUTROPHILS # BLD AUTO: 6.7 10E3/UL (ref 1.6–8.3)
NEUTROPHILS NFR BLD AUTO: 60 %
NITRATE UR QL: NEGATIVE
NRBC # BLD AUTO: 0 10E3/UL
NRBC BLD AUTO-RTO: 0 /100
PH UR STRIP: 7.5 [PH] (ref 5–7)
PLATELET # BLD AUTO: 294 10E3/UL (ref 150–450)
POTASSIUM BLD-SCNC: 3.5 MMOL/L (ref 3.4–5.3)
PROT SERPL-MCNC: 7.2 G/DL (ref 6.8–8.8)
RBC # BLD AUTO: 4.25 10E6/UL (ref 3.8–5.2)
RBC URINE: 40 /HPF
SODIUM SERPL-SCNC: 140 MMOL/L (ref 133–144)
SP GR UR STRIP: 1.01 (ref 1–1.03)
SPECIMEN EXPIRATION DATE: NORMAL
SPECIMEN EXPIRATION DATE: NORMAL
SQUAMOUS EPITHELIAL: 1 /HPF
TRICHOMONAS, WET PREP: ABNORMAL
UROBILINOGEN UR STRIP-MCNC: NORMAL MG/DL
WBC # BLD AUTO: 11.2 10E3/UL (ref 4–11)
WBC URINE: 32 /HPF
WBC'S/HIGH POWER FIELD, WET PREP: ABNORMAL
YEAST, WET PREP: ABNORMAL

## 2022-06-05 PROCEDURE — 87210 SMEAR WET MOUNT SALINE/INK: CPT | Performed by: EMERGENCY MEDICINE

## 2022-06-05 PROCEDURE — 99285 EMERGENCY DEPT VISIT HI MDM: CPT | Mod: 25 | Performed by: EMERGENCY MEDICINE

## 2022-06-05 PROCEDURE — 86901 BLOOD TYPING SEROLOGIC RH(D): CPT | Performed by: EMERGENCY MEDICINE

## 2022-06-05 PROCEDURE — 84702 CHORIONIC GONADOTROPIN TEST: CPT | Performed by: EMERGENCY MEDICINE

## 2022-06-05 PROCEDURE — 87591 N.GONORRHOEAE DNA AMP PROB: CPT | Performed by: EMERGENCY MEDICINE

## 2022-06-05 PROCEDURE — 96374 THER/PROPH/DIAG INJ IV PUSH: CPT | Performed by: EMERGENCY MEDICINE

## 2022-06-05 PROCEDURE — 87086 URINE CULTURE/COLONY COUNT: CPT | Performed by: ADVANCED PRACTICE MIDWIFE

## 2022-06-05 PROCEDURE — 76801 OB US < 14 WKS SINGLE FETUS: CPT | Mod: 26 | Performed by: RADIOLOGY

## 2022-06-05 PROCEDURE — 81001 URINALYSIS AUTO W/SCOPE: CPT | Performed by: EMERGENCY MEDICINE

## 2022-06-05 PROCEDURE — 36415 COLL VENOUS BLD VENIPUNCTURE: CPT | Performed by: EMERGENCY MEDICINE

## 2022-06-05 PROCEDURE — 76801 OB US < 14 WKS SINGLE FETUS: CPT

## 2022-06-05 PROCEDURE — 76817 TRANSVAGINAL US OBSTETRIC: CPT | Mod: 26 | Performed by: RADIOLOGY

## 2022-06-05 PROCEDURE — 82040 ASSAY OF SERUM ALBUMIN: CPT | Performed by: EMERGENCY MEDICINE

## 2022-06-05 PROCEDURE — 85025 COMPLETE CBC W/AUTO DIFF WBC: CPT | Performed by: EMERGENCY MEDICINE

## 2022-06-05 PROCEDURE — 87491 CHLMYD TRACH DNA AMP PROBE: CPT | Performed by: EMERGENCY MEDICINE

## 2022-06-05 PROCEDURE — 80053 COMPREHEN METABOLIC PANEL: CPT | Performed by: EMERGENCY MEDICINE

## 2022-06-05 PROCEDURE — 85610 PROTHROMBIN TIME: CPT | Performed by: EMERGENCY MEDICINE

## 2022-06-05 PROCEDURE — 86850 RBC ANTIBODY SCREEN: CPT | Performed by: EMERGENCY MEDICINE

## 2022-06-05 PROCEDURE — 99285 EMERGENCY DEPT VISIT HI MDM: CPT | Performed by: EMERGENCY MEDICINE

## 2022-06-05 ASSESSMENT — ENCOUNTER SYMPTOMS
HEMATURIA: 0
DYSURIA: 0
CONSTIPATION: 0
DIARRHEA: 0
FREQUENCY: 1
LIGHT-HEADEDNESS: 0
DIFFICULTY URINATING: 0
BACK PAIN: 1
DIZZINESS: 0
SHORTNESS OF BREATH: 0
ABDOMINAL PAIN: 1

## 2022-06-05 NOTE — TELEPHONE ENCOUNTER
TRIAGE CALL Patient calling   9 weeks pregnant   Cramping lower abdomnet  Some bleeding red - some are brown  started last night and its on and off - but still present now  Bleeding is more that spotting   Had sex yesterday   Pain 6/10     Denies dizziness clots, or shoulder pain  Per protocol, [place] [facility] [place].  Care advise given and caller s questions were answered  Reminded we will be here 24/7 and can call back and ask to speak with a nurse.  -Patient verbalized understanding and agrees with plan    Courtney Mcfarland RN Nurse Triage Advisor 4:31 PM 6/5/2022  Reason for Disposition    [1] Constant abdominal pain AND [2] present > 2 hours    Additional Information    Negative: Shock suspected (e.g., cold/pale/clammy skin, too weak to stand, low BP, rapid pulse)    Negative: Difficult to awaken or acting confused (e.g., disoriented, slurred speech)    Negative: Passed out (i.e., lost consciousness, collapsed and was not responding)    Negative: Sounds like a life-threatening emergency to the triager    Negative: [1] Vaginal bleeding AND [2] pregnant > 20 weeks    Negative: Not pregnant or pregnancy status unknown    Negative: SEVERE abdominal pain    Negative: [1] SEVERE vaginal bleeding (e.g., soaking 2 pads / hour, large blood clots) AND [2] present 2 or more hours    Negative: SEVERE dizziness (e.g., unable to stand, requires support to walk, feels like passing out)    Negative: [1] MODERATE vaginal bleeding (i.e., soaking 1 pad / hour; clots) AND [2] present > 6 hours    Negative: [1] MODERATE vaginal bleeding (i.e., soaking 1 pad / hour; clots) AND [2] pregnant > 12 weeks    Negative: Passed tissue (e.g., gray-white)    Negative: Shoulder pain    Negative: Pale skin (pallor) of new onset or worsening    Negative: Patient sounds very sick or weak to the triager    Protocols used: PREGNANCY - VAGINAL BLEEDING LESS THAN 20 WEEKS EGA-A-
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
## 2022-06-06 ENCOUNTER — OFFICE VISIT (OUTPATIENT)
Dept: MIDWIFE SERVICES | Facility: CLINIC | Age: 27
End: 2022-06-06
Payer: COMMERCIAL

## 2022-06-06 VITALS
SYSTOLIC BLOOD PRESSURE: 118 MMHG | BODY MASS INDEX: 24.19 KG/M2 | HEART RATE: 89 BPM | DIASTOLIC BLOOD PRESSURE: 74 MMHG | WEIGHT: 140.9 LBS

## 2022-06-06 DIAGNOSIS — O20.0 THREATENED ABORTION: Primary | ICD-10-CM

## 2022-06-06 LAB
C TRACH DNA SPEC QL NAA+PROBE: NEGATIVE
N GONORRHOEA DNA SPEC QL NAA+PROBE: NEGATIVE

## 2022-06-06 PROCEDURE — 250N000013 HC RX MED GY IP 250 OP 250 PS 637: Performed by: EMERGENCY MEDICINE

## 2022-06-06 PROCEDURE — 99213 OFFICE O/P EST LOW 20 MIN: CPT | Performed by: ADVANCED PRACTICE MIDWIFE

## 2022-06-06 PROCEDURE — 250N000011 HC RX IP 250 OP 636: Performed by: EMERGENCY MEDICINE

## 2022-06-06 RX ADMIN — CEPHALEXIN 250 MG: 250 CAPSULE ORAL at 00:13

## 2022-06-06 RX ADMIN — HUMAN RHO(D) IMMUNE GLOBULIN 300 MCG: 1500 SOLUTION INTRAMUSCULAR; INTRAVENOUS at 00:13

## 2022-06-06 ASSESSMENT — ENCOUNTER SYMPTOMS
NAUSEA: 0
COUGH: 0
CARDIOVASCULAR NEGATIVE: 1
FEVER: 0
FATIGUE: 0
VOMITING: 0
WEAKNESS: 0
EYES NEGATIVE: 1

## 2022-06-06 NOTE — PROGRESS NOTES
S: Patient presents to clinic for follow up visit from ED yesterday. Had a positive pregnancy test two weeks ago. Known LMP of 22, irregular cycles. Had intercourse on Saturday and then vaginal bleeding on . Has had mild cramping throughout.     O: /74   Pulse 89   Wt 63.9 kg (140 lb 14.4 oz)   LMP 2022   BMI 24.19 kg/m      General: well appearing, in NAD  Cardiac: well perfused  Respiratory: non-labored breathing on room air   Psych: alert and oriented     22: Ultrasound in ED:   IMPRESSION:   Anechoic structure in the endometrium is small and irregularly  marginated, presumably representing an abnormal appearing gestational  sac. No yolk sac or fetal pole identified. The findings of an absent  embryo > 6 weeks since the patient's last menstrual period are  suspicious for, but not diagnostic of, pregnancy failure. Recommend  follow up ultrasound in 7 to 10 days.    A/P:   (O20.0) Threatened   (primary encounter diagnosis)  Comment:   Plan: US OB < 14 Weeks Single Transabdominal, HCG         quantitative pregnancy          Discussed missed/threatened AB vs early viable pregnancy given irregular cycles. Explained that the best way to diagnose miscarriage is to repeat imaging after 10 days from first ultrasound. Already has an ultrasound scheduled on  that was scheduled for a dating US. Will stop at front and change it from dating to viability.     Order in for hcg quants if patient desires or will find it helpful information while waiting for next ultrasound. Reviewed what to expect with these results.     Reviewed signs/symptoms of miscarriage and comfort measures to cope at home. If bleeding is too heavy or pain not tolerated at home instructed to present to ED for evaluation.     Encouraged to send message or call with any questions or concerns. Plan to return to clinic on  for f/u ultrasound.   Lauren Gandhi CNM

## 2022-06-06 NOTE — RESULT ENCOUNTER NOTE
Final Chlamydia Trachomatis PCR is NEGATIVE.   No treatment or change in treatment per [facility] ED Lab Result Chlamydia Trachomatis protocol.

## 2022-06-06 NOTE — RESULT ENCOUNTER NOTE
Final N. Gonorrhoeae PCR is NEGATIVE.   No treatment or change in treatment per [facility] Lab Result N. Gonorrhea protocol.

## 2022-06-06 NOTE — DISCHARGE INSTRUCTIONS
TODAY'S VISIT:  You were seen today for low abdominal cramping and vaginal bleeding in the setting of early pregnancy/positive pregnancy test.    - We discussed your case and findings here today with the OB team.  There were no obvious emergent findings for which you need to be admitted, but you will need to follow-up closely in clinic (ASAP this week) and return to the ED immediately should things change or worsen.  - The exact cause of your symptoms is not yet known. We don't know if this is going to result in a normal pregnancy, if this an ectopic pregnancy (pregnancy outside of the uterus), or if this is a ongoing or impending miscarriage.    - Because of this, it is very important that you follow-up closely/soon as possible this week with the OB (obstetric) team, and even more important that you immediately return to the nearest Emergency Department with any new or worsening symptoms or any concerns whatsoever.  - You should discuss all imaging/radiology tests and laboratory tests that were performed during this visit with your usual providers to ensure you continue to improve and do not need any further evaluation, testing or management. (And your vaginal swab tests are still pending.)   - Please call your OB team to discuss and arrange a follow-up appointment for this week (call in the morning).     FOLLOW-UP:  Please make an appointment to follow up with:  - OB/Gyn - [facility] (phone: [phone]) as soon as possible, call in the morning to arrange.  - If you do not have a primary care provider, you can be seen in follow-up and establish care with one of our providers by calling of the the clinics below:  --- [facility] (phone: [phone])  --- [facility] (phone: [phone])   - Have your provider review the results from today's visit with you again to make sure no further follow-up or additional testing is needed based on those results.      PRESCRIPTIONS / MEDICATIONS:  - Please take the prescribed antibiotic until completed or told otherwise by a medical provider.   - Notify a medical provider of any abnormal reactions to this medication.   - We discussed this medication with our ED Pharmacist and they believe it would be safe in the setting of pregnancy.    OTHER INSTRUCTIONS:  - Do your best to stay hydrated.    RETURN TO THE EMERGENCY DEPARTMENT  Return to the Emergency Department immediately for any new or worsening symptoms or any concerns.     Remember that you can always come back to the Emergency Department if you are not able to see your regular doctor in the amount of time listed above, if you get any new symptoms, or if there is anything that worries you.

## 2022-06-07 LAB — BACTERIA UR CULT: NO GROWTH

## 2022-06-09 ENCOUNTER — LAB (OUTPATIENT)
Dept: LAB | Facility: CLINIC | Age: 27
End: 2022-06-09
Payer: COMMERCIAL

## 2022-06-09 DIAGNOSIS — O20.0 THREATENED ABORTION: ICD-10-CM

## 2022-06-09 LAB — B-HCG SERPL-ACNC: 80 IU/L (ref 0–5)

## 2022-06-09 PROCEDURE — 36415 COLL VENOUS BLD VENIPUNCTURE: CPT

## 2022-06-09 PROCEDURE — 84702 CHORIONIC GONADOTROPIN TEST: CPT

## 2022-06-17 ENCOUNTER — ANCILLARY PROCEDURE (OUTPATIENT)
Dept: ULTRASOUND IMAGING | Facility: CLINIC | Age: 27
End: 2022-06-17
Attending: ADVANCED PRACTICE MIDWIFE
Payer: COMMERCIAL

## 2022-06-17 ENCOUNTER — OFFICE VISIT (OUTPATIENT)
Dept: MIDWIFE SERVICES | Facility: CLINIC | Age: 27
End: 2022-06-17
Attending: ADVANCED PRACTICE MIDWIFE
Payer: COMMERCIAL

## 2022-06-17 VITALS
BODY MASS INDEX: 24.68 KG/M2 | SYSTOLIC BLOOD PRESSURE: 118 MMHG | HEART RATE: 75 BPM | DIASTOLIC BLOOD PRESSURE: 71 MMHG | WEIGHT: 143.8 LBS

## 2022-06-17 DIAGNOSIS — O03.9 COMPLETE MISCARRIAGE: Primary | ICD-10-CM

## 2022-06-17 DIAGNOSIS — O20.0 THREATENED ABORTION: ICD-10-CM

## 2022-06-17 PROCEDURE — 99212 OFFICE O/P EST SF 10 MIN: CPT | Performed by: ADVANCED PRACTICE MIDWIFE

## 2022-06-17 PROCEDURE — 76817 TRANSVAGINAL US OBSTETRIC: CPT | Performed by: OBSTETRICS & GYNECOLOGY

## 2022-06-17 NOTE — PROGRESS NOTES
S: Patient presents for ultrasound review. Reports that bleeding continued after last visit and was like a normal period. The cramping resolved after starting the antibiotics for the UTI. Overall, she is feeling well today.     O: /71   Pulse 75   Wt 65.2 kg (143 lb 12.8 oz)   LMP 04/04/2022   BMI 24.68 kg/m      General: well appearing, in NAD  Cardiac: well perfused  Respiratory: non-labored breathing on room air   Psych: alert and oriented     6/5/22: Ultrasound in ED:   IMPRESSION:   Anechoic structure in the endometrium is small and irregularly  marginated, presumably representing an abnormal appearing gestational  sac. No yolk sac or fetal pole identified. The findings of an absent  embryo > 6 weeks since the patient's last menstrual period are  suspicious for, but not diagnostic of, pregnancy failure. Recommend  follow up ultrasound in 7 to 10 days.    6/17/22: Ultrasound report:   No retained products of conception. Small simple cyst in left myometrium measuring 5mm    A/P:   (O03.9) Complete miscarriage  (primary encounter diagnosis)  Comment: We discussed that no further follow up is needed regarding the miscarriage as her body was able to clear all products of conception. Discussed timing for trying to conceive again, concerned about irregular cycles. Since it was a very early pregnancy and seemed like a normal period we discussed trying to conceive with this cycle as the benefits outweigh the risks of waiting.   Continue prenatal vitamin  Encouraged to reach out with any questions or concerns.  Lauren Gandhi CNM

## 2022-06-19 ENCOUNTER — HEALTH MAINTENANCE LETTER (OUTPATIENT)
Age: 27
End: 2022-06-19

## 2022-07-24 ENCOUNTER — MYC MEDICAL ADVICE (OUTPATIENT)
Dept: MIDWIFE SERVICES | Facility: CLINIC | Age: 27
End: 2022-07-24

## 2022-07-25 NOTE — TELEPHONE ENCOUNTER
Route to provider: [id number], complete miscarriage in June 2022  Reports no period since miscarriage, hx irregular cycles    Wanting to know if there is anything she should do to check in, wants to be active with TTC      From visit note 6/17/2022  We discussed that no further follow up is needed regarding the miscarriage as her body was able to clear all products of conception. Discussed timing for trying to conceive again, concerned about irregular cycles. Since it was a very early pregnancy and seemed like a normal period we discussed trying to conceive with this cycle as the benefits outweigh the risks of waiting.   Continue prenatal vitamin  Encouraged to reach out with any questions or concerns.

## 2022-09-27 ENCOUNTER — PRENATAL OFFICE VISIT (OUTPATIENT)
Dept: NURSING | Facility: CLINIC | Age: 27
End: 2022-09-27
Payer: COMMERCIAL

## 2022-09-27 VITALS — WEIGHT: 143 LBS | BODY MASS INDEX: 24.41 KG/M2 | HEIGHT: 64 IN

## 2022-09-27 DIAGNOSIS — Z34.90 ENCOUNTER FOR SUPERVISION OF NORMAL PREGNANCY: Primary | ICD-10-CM

## 2022-09-27 PROCEDURE — 99207 PR NO CHARGE NURSE ONLY: CPT

## 2022-09-27 NOTE — PROGRESS NOTES
Important Information for Provider:     New ob nurse intake by phone, second pregnancy. SAB 6/09/2022. Ultrasound scheduled for 10/04/2022 with CNM to review results. NOB with CNM 10/18/2022    Caffeine intake/servings daily - 1  Calcium intake/servings daily - 3  Exercise 2  times weekly - describe ; yoga, walks precautions given  Sunscreen used - Yes  Seatbelts used - Yes  Guns stored in the home - No  Self Breast Exam - Yes  Pap test up to date -  Yes  Eye exam up to date -  Yes  Dental exam up to date -  Yes  Immunizations reviewed and up to date - Yes  Abuse: Current or Past (Physical, Sexual or Emotional) - No  Do you feel safe in your environment - Yes  Do you cope well with stress - Yes  Do you suffer from insomnia - No  Prenatal OB Questionnaire  Patient supplied answers from flow sheet for:  Prenatal OB Questionnaire.  Past Medical History  Have you ever recieved care for your mental health? : No  Have you ever been in a major accident or suffered serious trauma?: No  Within the last year, has anyone hit, slapped, kicked or otherwise hurt you?: No  In the last year, has anyone forced you to have sex when you didn't want to?: No    Past Medical History 2   Have you ever received a blood transfusion?: No  Would you accept a blood transfusion if was medically recommended?: Yes  Does anyone in your home smoke?: No   Is your blood type Rh negative?: (!) Yes  Have you ever ?: No  Have you been hospitalized for a nonsurgical reason excluding normal delivery?: No  Have you ever had an abnormal pap smear?: No    Past Medical History (Continued)  Do you have a history of abnormalities of the uterus?: No  Did your mother take DAHIANA or any other hormones when she was pregnant with you?: No  Do you have any other problems we have not asked about which you feel may be important to this pregnancy?: No                     Allergies as of 9/27/2022:    Allergies as of 09/27/2022     (No Known Allergies)        Current medications are:  Current Outpatient Medications   Medication Sig Dispense Refill     Prenatal Vit-DSS-Fe Cbn-FA (PRENATAL AD PO)        clindamycin (CLEOCIN T) 1 % external lotion APPLY TO THE FACE 1-2 TIMES DAILY. (Patient not taking: Reported on 9/27/2022)           Early ultrasound screening tool:    Does patient have irregular periods?  No  Did patient use hormonal birth control in the three months prior to positive urine pregnancy test? No  Is the patient breastfeeding?  No  Is the patient 10 weeks or greater at time of education visit?  No

## 2022-10-04 ENCOUNTER — VIRTUAL VISIT (OUTPATIENT)
Dept: MIDWIFE SERVICES | Facility: CLINIC | Age: 27
End: 2022-10-04
Payer: COMMERCIAL

## 2022-10-04 DIAGNOSIS — K59.00 CONSTIPATION DURING PREGNANCY IN FIRST TRIMESTER: ICD-10-CM

## 2022-10-04 DIAGNOSIS — Z34.81 ENCOUNTER FOR SUPERVISION OF OTHER NORMAL PREGNANCY, FIRST TRIMESTER: Primary | ICD-10-CM

## 2022-10-04 DIAGNOSIS — O99.611 CONSTIPATION DURING PREGNANCY IN FIRST TRIMESTER: ICD-10-CM

## 2022-10-04 PROCEDURE — 99213 OFFICE O/P EST LOW 20 MIN: CPT | Mod: TEL | Performed by: ADVANCED PRACTICE MIDWIFE

## 2022-10-04 RX ORDER — DOCUSATE SODIUM 100 MG/1
100 CAPSULE, LIQUID FILLED ORAL 2 TIMES DAILY
Qty: 60 CAPSULE | Refills: 3 | Status: SHIPPED | OUTPATIENT
Start: 2022-10-04 | End: 2022-10-18

## 2022-10-04 NOTE — PROGRESS NOTES
Regina is a 27 year old who is being evaluated via a billable telephone visit.      What phone number would you like to be contacted at? [phone]  How would you like to obtain your AVS? MyChart    Assessment & Plan     (Z34.81) Encounter for supervision of other normal pregnancy, first trimester  (primary encounter diagnosis)    (O99.611,  K59.00) Constipation during pregnancy in first trimester  Plan: docusate sodium (COLACE) 100 MG capsule    [id number]}     MEDICATIONS:        - Start taking Colace as ordered       - Continue other medications without change  FUTURE APPOINTMENTS:       - New OB appt scheduled with Conchis Paul CNM on 10/18/22    No follow-ups on file.    Vasiliy Monroy CNM  [facility]    Subjective   Regina is a 27 year old presenting for the following health issues:  Ultrasound      HPI     Telephone visit with Regina to discuss U/S results. Discussed that preliminary result shows viable IUP at 9w0d, compared to LMP dating of 9w3d. Will keep the May 6, 2023 AMANUEL. Regina is struggling with constipation. She is not having much nausea, just some aversion to smells and sore breasts. Otherwise, feeling well. She has NOB visit scheduled.    Review of Systems   CONSTITUTIONAL: NEGATIVE for fever, chills, change in weight  GI: POSITIVE for constipation      Objective           Vitals:  No vitals were obtained today due to virtual visit.    Physical Exam   healthy, alert and no distress  PSYCH: Alert and oriented times 3; coherent speech, normal   rate and volume, able to articulate logical thoughts, able   to abstract reason, no tangential thoughts, no hallucinations   or delusions  Her affect is normal  RESP: No cough, no audible wheezing, able to talk in full sentences  Remainder of exam unable to be completed due to telephone visits    Vasiliy Monroy CNM    Phone call duration: 6 minutes

## 2022-10-14 ENCOUNTER — TELEPHONE (OUTPATIENT)
Dept: MIDWIFE SERVICES | Facility: CLINIC | Age: 27
End: 2022-10-14

## 2022-10-14 NOTE — TELEPHONE ENCOUNTER
Regina is  @10w6d  Called to discuss cramping and small amount of spotting    Reports digestive issues alternating between constipation and diarrhea this pregnancy. Had normal BM this am but noticed a small amount of blood on toilet paper, could not tell source of bleeding.     Small amounts of spotting since then, thinks vaginal bleeding. Mild abdominal discomfort.     10/4 ultrasound showed early lopez IUP with yolk sac and cardiac activity, measures 9w 0d by today's ultrasound, EDC remains 2023.    Reviewed bleeding in early pregnancy can occur for many reasons and though scary, does not always indicate miscarriage or anything wrong with pregnancy.   For light discharge or spotting, without other symptoms, monitor at home. Stay hydrated and rest as able. If develops heavy bleeding that is saturating a pad an hour for greater than 2 hours or uncontrolled pain - go to the emergency room.    Discussed how to contact provider after hours if any questions come up over the weekend.     Next office visit 10/18.     Patient comfortable with this plan.     Routing to on-call provider as FELIPE.

## 2022-10-17 LAB
ABO/RH(D): NORMAL
ANTIBODY SCREEN: NEGATIVE
SPECIMEN EXPIRATION DATE: NORMAL

## 2022-10-18 ENCOUNTER — PRENATAL OFFICE VISIT (OUTPATIENT)
Dept: MIDWIFE SERVICES | Facility: CLINIC | Age: 27
End: 2022-10-18
Payer: COMMERCIAL

## 2022-10-18 ENCOUNTER — TRANSCRIBE ORDERS (OUTPATIENT)
Dept: MATERNAL FETAL MEDICINE | Facility: CLINIC | Age: 27
End: 2022-10-18

## 2022-10-18 VITALS
WEIGHT: 147.8 LBS | OXYGEN SATURATION: 99 % | DIASTOLIC BLOOD PRESSURE: 67 MMHG | SYSTOLIC BLOOD PRESSURE: 114 MMHG | HEART RATE: 80 BPM | BODY MASS INDEX: 25.37 KG/M2

## 2022-10-18 DIAGNOSIS — Z23 NEED FOR PROPHYLACTIC VACCINATION AND INOCULATION AGAINST INFLUENZA: ICD-10-CM

## 2022-10-18 DIAGNOSIS — Z23 HIGH PRIORITY FOR 2019-NCOV VACCINE: ICD-10-CM

## 2022-10-18 DIAGNOSIS — O26.90 PREGNANCY RELATED CONDITION, ANTEPARTUM: Primary | ICD-10-CM

## 2022-10-18 DIAGNOSIS — Z34.01 ENCOUNTER FOR SUPERVISION OF NORMAL FIRST PREGNANCY IN FIRST TRIMESTER: Primary | ICD-10-CM

## 2022-10-18 PROBLEM — Z34.80 SUPERVISION OF OTHER NORMAL PREGNANCY, ANTEPARTUM: Status: ACTIVE | Noted: 2022-10-18

## 2022-10-18 LAB
ALBUMIN UR-MCNC: NEGATIVE MG/DL
APPEARANCE UR: CLEAR
BILIRUB UR QL STRIP: NEGATIVE
COLOR UR AUTO: YELLOW
ERYTHROCYTE [DISTWIDTH] IN BLOOD BY AUTOMATED COUNT: 13.1 % (ref 10–15)
GLUCOSE UR STRIP-MCNC: NEGATIVE MG/DL
HCT VFR BLD AUTO: 33.6 % (ref 35–47)
HGB BLD-MCNC: 11.2 G/DL (ref 11.7–15.7)
HGB UR QL STRIP: NEGATIVE
KETONES UR STRIP-MCNC: NEGATIVE MG/DL
LEUKOCYTE ESTERASE UR QL STRIP: NEGATIVE
MCH RBC QN AUTO: 28.9 PG (ref 26.5–33)
MCHC RBC AUTO-ENTMCNC: 33.3 G/DL (ref 31.5–36.5)
MCV RBC AUTO: 87 FL (ref 78–100)
NITRATE UR QL: NEGATIVE
PH UR STRIP: 6.5 [PH] (ref 5–7)
PLATELET # BLD AUTO: 302 10E3/UL (ref 150–450)
RBC # BLD AUTO: 3.88 10E6/UL (ref 3.8–5.2)
SP GR UR STRIP: 1.01 (ref 1–1.03)
UROBILINOGEN UR STRIP-ACNC: 0.2 E.U./DL
WBC # BLD AUTO: 8.5 10E3/UL (ref 4–11)

## 2022-10-18 PROCEDURE — 0124A COVID-19,PF,PFIZER BOOSTER BIVALENT: CPT | Performed by: ADVANCED PRACTICE MIDWIFE

## 2022-10-18 PROCEDURE — 86762 RUBELLA ANTIBODY: CPT | Performed by: ADVANCED PRACTICE MIDWIFE

## 2022-10-18 PROCEDURE — 99207 PR FIRST OB VISIT: CPT | Performed by: ADVANCED PRACTICE MIDWIFE

## 2022-10-18 PROCEDURE — 86900 BLOOD TYPING SEROLOGIC ABO: CPT | Performed by: ADVANCED PRACTICE MIDWIFE

## 2022-10-18 PROCEDURE — 86901 BLOOD TYPING SEROLOGIC RH(D): CPT | Performed by: ADVANCED PRACTICE MIDWIFE

## 2022-10-18 PROCEDURE — 86803 HEPATITIS C AB TEST: CPT | Performed by: ADVANCED PRACTICE MIDWIFE

## 2022-10-18 PROCEDURE — 90686 IIV4 VACC NO PRSV 0.5 ML IM: CPT | Performed by: ADVANCED PRACTICE MIDWIFE

## 2022-10-18 PROCEDURE — 87389 HIV-1 AG W/HIV-1&-2 AB AG IA: CPT | Performed by: ADVANCED PRACTICE MIDWIFE

## 2022-10-18 PROCEDURE — 90471 IMMUNIZATION ADMIN: CPT | Performed by: ADVANCED PRACTICE MIDWIFE

## 2022-10-18 PROCEDURE — 85027 COMPLETE CBC AUTOMATED: CPT | Performed by: ADVANCED PRACTICE MIDWIFE

## 2022-10-18 PROCEDURE — 87340 HEPATITIS B SURFACE AG IA: CPT | Performed by: ADVANCED PRACTICE MIDWIFE

## 2022-10-18 PROCEDURE — 81003 URINALYSIS AUTO W/O SCOPE: CPT | Performed by: ADVANCED PRACTICE MIDWIFE

## 2022-10-18 PROCEDURE — 36415 COLL VENOUS BLD VENIPUNCTURE: CPT | Performed by: ADVANCED PRACTICE MIDWIFE

## 2022-10-18 PROCEDURE — 86780 TREPONEMA PALLIDUM: CPT | Performed by: ADVANCED PRACTICE MIDWIFE

## 2022-10-18 PROCEDURE — 87086 URINE CULTURE/COLONY COUNT: CPT | Performed by: ADVANCED PRACTICE MIDWIFE

## 2022-10-18 PROCEDURE — 86850 RBC ANTIBODY SCREEN: CPT | Performed by: ADVANCED PRACTICE MIDWIFE

## 2022-10-18 PROCEDURE — 91312 COVID-19,PF,PFIZER BOOSTER BIVALENT: CPT | Performed by: ADVANCED PRACTICE MIDWIFE

## 2022-10-18 NOTE — PROGRESS NOTES
11w3d   Regina Mays is a 27 year old who presents to the clinic for an new ob visit. She is not a previous CNM patient. Friends with [facility]. She is feeling well. Had two days of spotting, minimal bleeding, one dot of blood each day. No cramping. Feeling normal early pregnancy symptoms. Having trouble with constipation but finding ways to help and feels like it is getting better. Discussed genetic testing, interested in learning more and testing. [facility] referral sent. Pap smear up to date. Covid and flu vaccination today.        Estimated Date of Delivery: May 6, 2023 is calculated from Patient's last menstrual period was 07/30/2022.     She has had bleeding since her LMP.  The bleeding has resolved.   She has had mild nausea. Weigh loss has not occurred.   This was a planned pregnancy.   FOB is involved, Carloz   OTHER CONCERNS: No concerns     INFECTION HISTORY  HIV: no  Hepatitis B: no  Hepatitis C: no  Syphilis:  no  Tuberculosis: no   PPD- no  Herpes self: no  Herpes partner:  no  Chlamydia:  no  Gonorrhea:  no  HPV: no  BV:  no  Trichomonis:  no  Chicken Pox:  YES  ====================================================  GENETIC SCREENING  Genetic screening reviewed. High Risk? None   ====================================================  PERSONAL/SOCIAL HISTORY  Lives lives with their family.  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  HX OF ABUSE: no  =====================================================   REVIEW OF SYSTEMS  CONSTITUTIONAL: NEGATIVE for fever, chills  EYES: NEGATIVE for vision changes   RESP: NEGATIVE for significant cough or SOB  CV: NEGATIVE for chest pain, palpitations   GI: NEGATIVE for nausea, abdominal pain, heartburn, or change in bowel habits  : NEGATIVE for frequency, dysuria, or hematuria  MUSCULOSKELETAL: NEGATIVE for significant arthralgias or myalgia  NEURO: NEGATIVE for weakness,  dizziness or paresthesias or headache  ====================================================    PHYSICAL EXAM:  /67   Pulse 80   Wt 67 kg (147 lb 12.8 oz)   LMP 2022   SpO2 99%   BMI 25.37 kg/m    BMI- Body mass index is 25.37 kg/m .,     RECOMMENDED WEIGHT GAIN: 15-25 lbs.  PHQ9- Today's Depression Rating was No Value exists for the : HP#PHQ9  GENERAL:  Pleasant pregnant female, alert, well groomed.   SKIN:  Warm and dry, without lesions or rashes  HEAD: Symmetrical features.   NECK:  Thyroid without enlargement and nodules.  Lymph nodes not palpable.   LUNGS:  Clear to auscultation.  HEART:  RRR without murmur.  ABDOMEN: Soft without masses , tenderness or organomegaly.  No CVA tenderness. No scars noted.     MUSCULOSKELETAL:  Full range of motion  EXTREMITIES:  No edema. No significant varicosities.   GENITALIA: deferred.    =========================================  ASSESSMENT:  11w3d  (Z34.01) Encounter for supervision of normal first pregnancy in first trimester  (primary encounter diagnosis)  Plan: [facility] Referral - Pregnancy    (Z23) High priority for 2019-nCoV vaccine  Plan: COVID-19,PF,PFIZER BOOSTER BIVALENT 12+Yrs    (Z23) Need for prophylactic vaccination and inoculation against influenza  Plan: INFLUENZA VACCINE IM > 6 MONTHS VALENT IIV4         (AFLURIA/FLUZONE)    PREGNANCY AT RISK? no  ==========================================  PLAN:  Instructed on use of triage nurse line and contacting the on call CNM after hours for an urgent need such as fever, vagina bleeding, bladder or vaginal infection, rupture of membranes,  or term labor.    Discussed the indications, uses for and false positives for quad screen, nuchal translucency and fetal survey ultrasound at 18-20 weeks gestation. MFM referral.   Instructed on best evidence for: weight gain for her BMI for pregnancy; healthy diet and foods to avoid; exercise and activity during pregnancy;avoiding exposure  to toxoplasmosis; and maintenance of a generally healthy lifestyle.   Discussed the harms, benefits, side effects and alternative therapies for current prescribed and OTC medications.  Follow up in 4 weeks.     JORGE RodriguezM

## 2022-10-19 LAB
BACTERIA UR CULT: NO GROWTH
HBV SURFACE AG SERPL QL IA: NONREACTIVE
HCV AB SERPL QL IA: NONREACTIVE
HIV 1+2 AB+HIV1 P24 AG SERPL QL IA: NONREACTIVE
RUBV IGG SERPL QL IA: 2.04 INDEX
RUBV IGG SERPL QL IA: POSITIVE
T PALLIDUM AB SER QL: NONREACTIVE

## 2022-10-26 ENCOUNTER — PRE VISIT (OUTPATIENT)
Dept: MATERNAL FETAL MEDICINE | Facility: CLINIC | Age: 27
End: 2022-10-26

## 2022-11-01 ENCOUNTER — HOSPITAL ENCOUNTER (OUTPATIENT)
Dept: ULTRASOUND IMAGING | Facility: CLINIC | Age: 27
Discharge: HOME | End: 2022-11-01
Attending: ADVANCED PRACTICE MIDWIFE
Payer: COMMERCIAL

## 2022-11-01 ENCOUNTER — OFFICE VISIT (OUTPATIENT)
Dept: MATERNAL FETAL MEDICINE | Facility: CLINIC | Age: 27
End: 2022-11-01
Attending: ADVANCED PRACTICE MIDWIFE
Payer: COMMERCIAL

## 2022-11-01 ENCOUNTER — OFFICE VISIT (OUTPATIENT)
Dept: MATERNAL FETAL MEDICINE | Facility: CLINIC | Age: 27
End: 2022-11-01
Attending: OBSTETRICS & GYNECOLOGY
Payer: COMMERCIAL

## 2022-11-01 ENCOUNTER — HOSPITAL ENCOUNTER (OUTPATIENT)
Dept: ULTRASOUND IMAGING | Facility: CLINIC | Age: 27
Discharge: HOME | End: 2022-11-01
Attending: OBSTETRICS & GYNECOLOGY
Payer: COMMERCIAL

## 2022-11-01 ENCOUNTER — TRANSFERRED RECORDS (OUTPATIENT)
Dept: HEALTH INFORMATION MANAGEMENT | Facility: CLINIC | Age: 27
End: 2022-11-01

## 2022-11-01 DIAGNOSIS — O26.90 PREGNANCY RELATED CONDITION, ANTEPARTUM: ICD-10-CM

## 2022-11-01 DIAGNOSIS — O35.8XX0 CYSTIC HYGROMA OF FETUS IN SINGLETON PREGNANCY: ICD-10-CM

## 2022-11-01 DIAGNOSIS — O28.3 ABNORMAL PRENATAL ULTRASOUND: ICD-10-CM

## 2022-11-01 DIAGNOSIS — Z36.9 UNSPECIFIED ANTENATAL SCREENING: Primary | ICD-10-CM

## 2022-11-01 DIAGNOSIS — O35.8XX0 CYSTIC HYGROMA OF FETUS IN SINGLETON PREGNANCY: Primary | ICD-10-CM

## 2022-11-01 DIAGNOSIS — O35.9XX0 FETAL ABNORMALITY AFFECTING MANAGEMENT OF MOTHER, SINGLE OR UNSPECIFIED FETUS: ICD-10-CM

## 2022-11-01 DIAGNOSIS — Z31.5 ENCOUNTER FOR PROCREATIVE GENETIC COUNSELING AND TESTING: ICD-10-CM

## 2022-11-01 PROCEDURE — 59015 CHORION BIOPSY: CPT | Performed by: OBSTETRICS & GYNECOLOGY

## 2022-11-01 PROCEDURE — 76946 ECHO GUIDE FOR AMNIOCENTESIS: CPT | Mod: 26 | Performed by: OBSTETRICS & GYNECOLOGY

## 2022-11-01 PROCEDURE — 59015 CHORION BIOPSY: CPT

## 2022-11-01 PROCEDURE — 36415 COLL VENOUS BLD VENIPUNCTURE: CPT | Performed by: OBSTETRICS & GYNECOLOGY

## 2022-11-01 PROCEDURE — 99213 OFFICE O/P EST LOW 20 MIN: CPT | Mod: 25 | Performed by: OBSTETRICS & GYNECOLOGY

## 2022-11-01 PROCEDURE — 76813 OB US NUCHAL MEAS 1 GEST: CPT | Mod: 26 | Performed by: OBSTETRICS & GYNECOLOGY

## 2022-11-01 PROCEDURE — 76945 ECHO GUIDE VILLUS SAMPLING: CPT

## 2022-11-01 PROCEDURE — 76815 OB US LIMITED FETUS(S): CPT | Mod: 26 | Performed by: OBSTETRICS & GYNECOLOGY

## 2022-11-01 PROCEDURE — 88275 CYTOGENETICS 100-300: CPT | Performed by: OBSTETRICS & GYNECOLOGY

## 2022-11-01 PROCEDURE — 96372 THER/PROPH/DIAG INJ SC/IM: CPT | Mod: 59 | Performed by: OBSTETRICS & GYNECOLOGY

## 2022-11-01 PROCEDURE — 96040 HC GENETIC COUNSELING, EACH 30 MINUTES: CPT | Performed by: GENETIC COUNSELOR, MS

## 2022-11-01 PROCEDURE — 81265 STR MARKERS SPECIMEN ANAL: CPT | Mod: 91 | Performed by: OBSTETRICS & GYNECOLOGY

## 2022-11-01 PROCEDURE — 250N000011 HC RX IP 250 OP 636: Performed by: OBSTETRICS & GYNECOLOGY

## 2022-11-01 PROCEDURE — 81265 STR MARKERS SPECIMEN ANAL: CPT | Performed by: OBSTETRICS & GYNECOLOGY

## 2022-11-01 PROCEDURE — 76813 OB US NUCHAL MEAS 1 GEST: CPT

## 2022-11-01 RX ORDER — HEPARIN SODIUM (PORCINE) LOCK FLUSH IV SOLN 100 UNIT/ML 100 UNIT/ML
1 SOLUTION INTRAVENOUS ONCE
Status: COMPLETED | OUTPATIENT
Start: 2022-11-01 | End: 2022-11-01

## 2022-11-01 RX ADMIN — HEPARIN 1 ML: 100 SYRINGE at 12:15

## 2022-11-01 RX ADMIN — HUMAN RHO(D) IMMUNE GLOBULIN 300 MCG: 300 INJECTION, SOLUTION INTRAMUSCULAR at 12:30

## 2022-11-01 NOTE — PROGRESS NOTES
Please see the imaging tab for details of the ultrasound performed today.    Conchis Matos MD  Specialist in Maternal-Fetal Medicine

## 2022-11-01 NOTE — NURSING NOTE
Pt here for Chorionic Villi Sampling d/t cystic hygroma. See genetic counseling dictation.  After consent signed and TimeOut completed, Dr. Whaley withdrew adequate villi xone transabdominal pass.  Maternal cell contamination bloodwork drawn in clinic.  Pt is RH negative.  MD reviewed blood type and screen and Rhogam ordered. Rhogam was given today at 1230 LOT#:  [id number].  EXP:  10/29/2023. Discharge teaching completed and questions answered.   Patient reports slight pain.  Pt discharged ambulatory and stable.  Charge tech pager called to notify of specimen, specimen transported to [facility] and handed off to [facility] at 1255.  Work-aid completed, signed and accompanied specimen.

## 2022-11-01 NOTE — PROGRESS NOTES
[facility] Fetal Medicine [place]  Genetic Counseling Consult    Patient: Regina Mays YOB: 1995   Date of Service: 22      Regina Mays was seen at [facility] for genetic consultation to discuss the options for screening and testing for fetal chromosome abnormalities.  The indication for genetic counseling is routine screening for aneuploidy.        Impression/Plan:   1.  Regina was seen in [facility] today for routine aneuploidy screening, however today's ultrasound identified a thickened nuchal translucency.  Please see ultrasound report for full details.  After today's ultrasound Regina opted to have chorionic villus sampling.     2.  The following testing plan as established for CVS: FISH with reflex to either chromosome analysis/karyotype (if FISH is abnormal) or SNP Microarray (if FISH is negative).  A cell culture will be maintained for further testing if needed.      3.  FISH results are expected in 3-4 days and will be available in Epic and [employer]. Regina will be contacted to discuss results before their release to [employer].  Final diagnostic results (karyotype or array) will be expected in 10-14 days.    4.  A cell culture will be initiated on CVS specimen to allow for further testing if indicated.     Pregnancy History:   /Parity:    Age at Delivery: 28 year old  AMANUEL: 2023, by Last Menstrual Period  Gestational Age: 13w3d    No significant complications or exposures were reported in the current pregnancy.    Regina byrne pregnancy history is significant for 1 first trimester SAB this summer with no known cause.    Medical History:   Regina byrne reported medical history is not expected to impact pregnancy management or risks to fetal development.       Family History:   A three-generation pedigree was obtained, and is scanned under the  Media  tab.   The following significant findings were reported by Regina:    Sherrys  partner Carloz reports his father was diagnosed with prostate cancer in his 50s.  We discussed that this earlier onset diagnosis should be considered when planning Carloz's cancer screening, and that cancer specific genetic counseling is available.      Otherwise, the reported family history is negative for multiple miscarriages, stillbirths, birth defects, cognitive impairment, known genetic conditions, and consanguinity.       Carrier Screening:       Expanded carrier screening for mutations in a large panel of genes associated with autosomal recessive conditions including cystic fibrosis, spinal muscular atrophy, and others, is now available.      The patient was not certain about whether to pursue carrier screening today.  She was provided with a brochure about her testing options, and contact information if she has questions or wishes to pursue screening.       Risk Assessment for Chromosome Conditions:   We explained that the risk for fetal chromosome abnormalities increases with maternal age. We discussed specific features of common chromosome abnormalities, including Down syndrome, trisomy 13, trisomy 18, and sex chromosome trisomies.      - At age 27 at midtrimester, the risk to have a baby with Down syndrome is 1 in 928.     - At age 27 at midtrimester, the risk to have a baby with any chromosome abnormality is 1 in 464.          We reviewed the increased nuchal translucency finding in today's ultrasound. Several measurements were obtained today and the specific value discussed was 3.8 mm.     Nuchal translucency refers to a region between the skin and soft tissues behind the cervical spine. This space is expected to have an amount of fluid between the 10th and 14th weeks of gestation and is considered normal below a defined threshold. The nuchal translucency measurement is taken when the fetus measures a crown-rump length between 36 to 84 mm which corresponds to approximately the 10th to 14 week of  gestation. Typically any measurement over 3.0 mm or the 95th percentile is concerning.     Increased nuchal translucency has been associated with an increased risk for aneuploidy, heart defects, and other more rare genetic conditions. Approximately 20-65% of babies who have an increased nuchal translucency on ultrasound may have an associated chromosome abnormality, most commonly Down syndrome or Thomas syndrome. As the measurement increases the chance of aneuploidy, fetal death, and major anomalies increases.  Increased nuchal translucency has been associated with a 5-20% risk for a heart defect.     We discussed a specific risk for NT measurements between 3.5 and 4.4 mm of ~20% risk for chromosome abnormality 10-15% risk for other complications including other, less common genetic disorders, birth defects, and fetal loss.      Increased nuchal translucency can resolve spontaneously by the second trimester and this is reassuring in the context of a normal euploid fetus. However, due to the finding of an increased nuchal translucency, extra ultrasounds and possibly a fetal echocardiogram may still be indicated. If the increased nuchal translucency remains or progresses there is concern for an underlying abnormality even in the context of normal screening or diagnostic chromosome analysis.     A cystic hygroma is a congenital malformation resulting from lymph accumulation and are often large in size and extend further down the length of the fetus than an increased nuchal translucency. Cystic hygromas are generally more concerning than an increased nuchal translucency. The mean size of a first trimester cystic hygroma is 8mm with some measuring over 30mm. Cystic hygromas can be septated or simple. Cystic hygromas are associated with an increased risk for aneuploidy and structural malformations which can increase the risk for miscarriages, hydrops, fetal demise, and  death.  A septated cystic hygroma does have  increased risk for aneuploidy compared to simple. Furthermore, one third of euploid fetuses with first trimester septated cystic hygromas have major structural anomalies, primarily cardiac and skeletal. In comparison, over 80% of euploidy fetuses with first trimester simple cystic hygromas have them resolve within four weeks and are phenotypically normal.      Chromosome analysis: Offers assessment of aneuploidy which will have an approximate 50% yield      Microarray analysis: Recommended with any diagnostic testing and will have an incremental yield of 4% in pregnancies with increased NT but normal chromosomes      RASopathy panel (including conditions like Sulma syndrome): Yield of approximately 10% (and up to 26% in some studies) in pregnancies with no detected chromosome abnormalities. In addition, the yield is likely higher in the presence of other ultrasound abnormalities. Because of the sensitive and sudden nature of today's consult, additional testing beyond chromosome/SNP array was not discussed in detail.  However a cell culture will be initiated on CVS specimen to allow for discussion of further testing at a later time.        If all genetic studies are normal, increased ultrasound surveillance is still recommended including a fetal echocardiogram given the increased chance of a congenital heart defect       We discussed the options of screening by NIPT or diagnostic options such as a CVS or amniocentesis. Regina elected to proceed with CVS    The benefits and limitations of CVS vs amniocentesis were discussed in detail today.  Specifically, we discussed the possibility of CVS results to not directly correlate to the genetic makeup of the fetus.  We discussed that this can be due to a phenomenon called confined placental mosaicism, in which some placental cells have a genetic abnormality that is not present in the fetus.  CVS results can also be impacted by maternal cell contamination, in which cells  from Regina are present in the test sample and reflected in the genetic results.  Either of these possibilities are considered rare, however, we discussed that in some cases, an amniocentesis may be recommended to clarify CVS results.  The benefit of CVS is the earlier timeframe for testing.  Regina demonstrated a strong understanding of the benefits and limitations of both diagnostic testing options and she opted to proceed with CVS today.  Her blood will be drawn for maternal cell contamination testing as a part of this care.  Appropriate consent was obtained for all tests and procedures and the patient will be contacted to discuss results as soon as they are available.        Testing Options:   We discussed the following options:   Non-invasive Prenatal Testing (NIPT)    Maternal plasma cell-free DNA testing; first trimester ultrasound with nuchal translucency and nasal bone assessment is recommended, when appropriate    Screens for fetal trisomy 21, trisomy 13, trisomy 18, and sex chromosome aneuploidy    Cannot screen for open neural tube defects; maternal serum AFP after 15 weeks is recommended    Chorionic villus sampling (CVS)    Invasive procedure typically performed in the first trimester by which placental villi are obtained for the purpose of chromosome analysis and/or other prenatal genetic analysis    Diagnostic results; >99% sensitivity for fetal chromosome abnormalities    Cannot test for open neural tube defects; maternal serum AFP after 15 weeks is recommended       Genetic Amniocentesis    Invasive procedure typically performed in the second trimester by which amniotic fluid is obtained for the purpose of chromosome analysis and/or other prenatal genetic analysis    Diagnostic results; >99% sensitivity for fetal chromosome abnormalities    AFAFP measurement tests for open neural tube defects       Comprehensive (Level II) ultrasound: Detailed ultrasound performed between 18-22 weeks gestation to  screen for major birth defects and markers for aneuploidy.        We reviewed the benefits and limitations of this testing.  Screening tests provide a risk assessment specific to the pregnancy for certain fetal chromosome abnormalities, but cannot definitively diagnose or exclude a fetal chromosome abnormality.  Follow-up genetic counseling and consideration of diagnostic testing is recommended with any abnormal screening result.     Diagnostic tests carry inherent risks- including risk of miscarriage- that require careful consideration.  These tests can detect fetal chromosome abnormalities with greater than 99% certainty.  Results can be compromised by maternal cell contamination or mosaicism, and are limited by the resolution of cytogenetic G-banding technology.  There is no screening nor diagnostic test that can detect all forms of birth defects or mental disability.     It was a pleasure to be involved with [facility] care. Face-to-face time of the meeting was 45 minutes.      Ric Johnston MS, [facility]  Licensed Genetic Counselor  Phone: [phone]  Pager: [phone]

## 2022-11-01 NOTE — PROGRESS NOTES
"Please see \"Imaging\" tab under \"Chart Review\" for details of today's visit.    Jayjay Whaley    "

## 2022-11-02 LAB — MCCMA SPECIMEN: NORMAL

## 2022-11-04 ENCOUNTER — TELEPHONE (OUTPATIENT)
Dept: MATERNAL FETAL MEDICINE | Facility: CLINIC | Age: 27
End: 2022-11-04

## 2022-11-04 LAB — CHROM ANALY INTERPHASE CVS FISH-IMP: NORMAL

## 2022-11-04 NOTE — TELEPHONE ENCOUNTER
11/4/2022    Called Regina to discuss FISH results from her recent CVS.  Results are normal, male pattern.  This result indicates two copies each of chromosomes 21, 18, and 13, and 1 copy each of sex chromosomes X and Y.  We discussed that this result is highly reassuring for the most common genetic conditions associated with thick nuchal translucency.  We reviewed the testing plan of reflex to a chromosome microarray, and that these results are expected in 7-10 days.  Regina confirmed her upcoming OB and MFM appointments and no further questions at this time.  These results will be released to [facility] immediately per patient request, and Regina plans to learn fetal sex of pregnancy by reviewing these results in [facility].     Ric Johnston MS, [facility]  Licensed Genetic Counselor  Phone: [phone]  Pager: [phone]

## 2022-11-10 ENCOUNTER — HOSPITAL ENCOUNTER (EMERGENCY)
Facility: CLINIC | Age: 27
Discharge: LEFT BEFORE DISCHARGE | End: 2022-11-11
Payer: COMMERCIAL

## 2022-11-10 VITALS
OXYGEN SATURATION: 97 % | TEMPERATURE: 97.9 F | SYSTOLIC BLOOD PRESSURE: 109 MMHG | RESPIRATION RATE: 16 BRPM | DIASTOLIC BLOOD PRESSURE: 74 MMHG | HEART RATE: 72 BPM

## 2022-11-11 NOTE — ED TRIAGE NOTES
Triage Assessment     Row Name 11/10/22 4971       Triage Assessment (Adult)    Airway WDL WDL       Respiratory WDL    Respiratory WDL WDL       Skin Circulation/Temperature WDL    Skin Circulation/Temperature WDL WDL       Cardiac WDL    Cardiac WDL WDL       Peripheral/Neurovascular WDL    Peripheral Neurovascular WDL WDL       Cognitive/Neuro/Behavioral WDL    Cognitive/Neuro/Behavioral WDL WDL

## 2022-11-15 ENCOUNTER — PRENATAL OFFICE VISIT (OUTPATIENT)
Dept: MIDWIFE SERVICES | Facility: CLINIC | Age: 27
End: 2022-11-15
Payer: COMMERCIAL

## 2022-11-15 VITALS
SYSTOLIC BLOOD PRESSURE: 126 MMHG | HEIGHT: 64 IN | HEART RATE: 80 BPM | WEIGHT: 148 LBS | DIASTOLIC BLOOD PRESSURE: 82 MMHG | BODY MASS INDEX: 25.27 KG/M2 | OXYGEN SATURATION: 100 %

## 2022-11-15 DIAGNOSIS — Z34.02 ENCOUNTER FOR SUPERVISION OF NORMAL FIRST PREGNANCY IN SECOND TRIMESTER: Primary | ICD-10-CM

## 2022-11-15 PROCEDURE — 99000 SPECIMEN HANDLING OFFICE-LAB: CPT | Performed by: ADVANCED PRACTICE MIDWIFE

## 2022-11-15 PROCEDURE — 36415 COLL VENOUS BLD VENIPUNCTURE: CPT | Performed by: ADVANCED PRACTICE MIDWIFE

## 2022-11-15 PROCEDURE — 82105 ALPHA-FETOPROTEIN SERUM: CPT | Mod: 90 | Performed by: ADVANCED PRACTICE MIDWIFE

## 2022-11-15 PROCEDURE — 99207 PR PRENATAL VISIT: CPT | Performed by: ADVANCED PRACTICE MIDWIFE

## 2022-11-15 NOTE — PROGRESS NOTES
15w3d  Regina is here with Carloz today. Feeling well. She had gas pain earlier this week and sought care in ED but has now resolved. Having some migraines and taking Tylenol, drinking caffeine. Genetic testing CVS is WNL with [facility]. Plan for msAFP today. RTC in 4 weeks after anatomy US.    JORGE Castillo CNM

## 2022-11-16 LAB
# FETUSES US: NORMAL
AFP MOM SERPL: 1.1
AFP SERPL-MCNC: 35 NG/ML
AGE - REPORTED: 28.1 YR
CHROM ANALY RESULT (ISCN): NORMAL
CURRENT SMOKER: NO
FAMILY MEMBER DISEASES HX: NO
GA METHOD: NORMAL
GA: NORMAL WK
IDDM PATIENT QL: NO
INTEGRATED SCN PATIENT-IMP: NORMAL
MATERNAL CELL CONTAM SPEC: NORMAL
SERVICE CMNT-IMP: YES
SPECIMEN DRAWN SERPL: NORMAL

## 2022-11-17 ENCOUNTER — TELEPHONE (OUTPATIENT)
Dept: MATERNAL FETAL MEDICINE | Facility: CLINIC | Age: 27
End: 2022-11-17

## 2022-11-17 NOTE — TELEPHONE ENCOUNTER
11/17/2022    Called Regina to discus results from her CVS.  SNP Microarray results are normal male. (arr(X,Y)x1,(1-22)x2 )  This result indicates that no clinically significant copy number variations or regions of homozygosity were detected with testing.  We discussed that this result rules out chromosome abnormalities and microdeletion/duplication syndromes for the pregnancy, which are the most likely underlying genetic conditions associated with thickened nuchal translucency/cystic hygroma.  We discussed that additional genetic testing could be indicated after the results of her early anatomy scan next week. We briefly reviewed her recent maternal serum AFP, which is negative for open neural tube defects. Regina confirmed her appointments for early anatomy scan (11/22) and fetal echocardiogram (12/27).  All of Regina's questions were answered to her satisfaction and she was encouraged to remain in contact as needed moving forward.     Ric Johnston MS, [facility]  Licensed Genetic Counselor  Phone: [phone]  Pager: [phone]

## 2022-11-22 ENCOUNTER — HOSPITAL ENCOUNTER (OUTPATIENT)
Dept: ULTRASOUND IMAGING | Facility: CLINIC | Age: 27
Discharge: HOME | End: 2022-11-22
Attending: OBSTETRICS & GYNECOLOGY
Payer: COMMERCIAL

## 2022-11-22 ENCOUNTER — OFFICE VISIT (OUTPATIENT)
Dept: MATERNAL FETAL MEDICINE | Facility: CLINIC | Age: 27
End: 2022-11-22
Attending: OBSTETRICS & GYNECOLOGY
Payer: COMMERCIAL

## 2022-11-22 DIAGNOSIS — O35.8XX0 CYSTIC HYGROMA OF FETUS IN SINGLETON PREGNANCY: Primary | ICD-10-CM

## 2022-11-22 DIAGNOSIS — O35.8XX0 CYSTIC HYGROMA OF FETUS IN SINGLETON PREGNANCY: ICD-10-CM

## 2022-11-22 PROCEDURE — 76805 OB US >/= 14 WKS SNGL FETUS: CPT

## 2022-11-22 PROCEDURE — 76805 OB US >/= 14 WKS SNGL FETUS: CPT | Mod: 26 | Performed by: OBSTETRICS & GYNECOLOGY

## 2022-11-22 NOTE — PROGRESS NOTES
The patient was seen for an ultrasound in the Maternal-Fetal Medicine Center at the [facility] today.  For a detailed report of the ultrasound examination, please see the ultrasound report which can be found under the imaging tab.    Patsy Draper MD  , OB/GYN  Maternal-Fetal Medicine  [phone] (Pager)

## 2022-11-28 ENCOUNTER — TELEPHONE (OUTPATIENT)
Dept: MATERNAL FETAL MEDICINE | Facility: CLINIC | Age: 27
End: 2022-11-28

## 2022-11-28 NOTE — TELEPHONE ENCOUNTER
11/28/2022    Called Regina to follow up on previous conversations regarding genetic testing for her ongoing pregnancy.  We briefly reviewed the negative microarray results for pregnancy, as well as her normal early anatomy scan last week. Regina reports that she and her  are feeling reassured by all of their results thus far and are comfortable not pursuing any additional genetic testing at this time.  Regina understands that the testing thus far has not assessed for all possible genetic conditions, and that no genetic testing can rule out all complications for pregnancy.      All of Regina's questions were answered to her satisfaction and she understands that the testing lab will be discarding the remaining sample from her CVS with no further genetic testing planned.     Ric Johnston MS, [facility]  Licensed Genetic Counselor  Phone: [phone]  Pager: [phone]

## 2022-12-13 ENCOUNTER — OFFICE VISIT (OUTPATIENT)
Dept: MATERNAL FETAL MEDICINE | Facility: CLINIC | Age: 27
End: 2022-12-13
Attending: OBSTETRICS & GYNECOLOGY
Payer: COMMERCIAL

## 2022-12-13 ENCOUNTER — PRENATAL OFFICE VISIT (OUTPATIENT)
Dept: MIDWIFE SERVICES | Facility: CLINIC | Age: 27
End: 2022-12-13
Payer: COMMERCIAL

## 2022-12-13 ENCOUNTER — HOSPITAL ENCOUNTER (OUTPATIENT)
Dept: ULTRASOUND IMAGING | Facility: CLINIC | Age: 27
Discharge: HOME | End: 2022-12-13
Attending: OBSTETRICS & GYNECOLOGY
Payer: COMMERCIAL

## 2022-12-13 VITALS
SYSTOLIC BLOOD PRESSURE: 109 MMHG | BODY MASS INDEX: 26.12 KG/M2 | HEART RATE: 92 BPM | OXYGEN SATURATION: 99 % | WEIGHT: 153 LBS | HEIGHT: 64 IN | DIASTOLIC BLOOD PRESSURE: 65 MMHG

## 2022-12-13 DIAGNOSIS — O35.8XX0 CYSTIC HYGROMA OF FETUS IN SINGLETON PREGNANCY: Primary | ICD-10-CM

## 2022-12-13 DIAGNOSIS — Z34.02 ENCOUNTER FOR SUPERVISION OF NORMAL FIRST PREGNANCY IN SECOND TRIMESTER: Primary | ICD-10-CM

## 2022-12-13 DIAGNOSIS — O35.8XX0 CYSTIC HYGROMA OF FETUS IN SINGLETON PREGNANCY: ICD-10-CM

## 2022-12-13 PROCEDURE — 76811 OB US DETAILED SNGL FETUS: CPT | Mod: 26 | Performed by: OBSTETRICS & GYNECOLOGY

## 2022-12-13 PROCEDURE — 76811 OB US DETAILED SNGL FETUS: CPT

## 2022-12-13 PROCEDURE — 99207 PR PRENATAL VISIT: CPT | Performed by: ADVANCED PRACTICE MIDWIFE

## 2022-12-13 RX ORDER — CHLORAL HYDRATE 500 MG
2 CAPSULE ORAL DAILY
COMMUNITY
End: 2024-08-15

## 2022-12-13 RX ORDER — OMEGA-3/DHA/EPA/FISH OIL 60 MG-90MG
CAPSULE ORAL
COMMUNITY
End: 2023-04-20

## 2022-12-13 NOTE — PROGRESS NOTES
19w3d   Pt here with Carloz.  C/O more difficulty with sleeping, discussed comfort measures, could try Tylenol PM.  Had MFM appt this am, still has fetal echo scheduled.  Plan GCT and hgb @ next visit in 5 wks JORGE HernandezM

## 2022-12-13 NOTE — PROGRESS NOTES
"Please see \"Imaging\" tab under \"Chart Review\" for details of today's US at the [facility].    Thang Nicole MD  Maternal-Fetal Medicine      "

## 2022-12-27 ENCOUNTER — HOSPITAL ENCOUNTER (OUTPATIENT)
Dept: CARDIOLOGY | Facility: CLINIC | Age: 27
Discharge: HOME | End: 2022-12-27
Attending: OBSTETRICS & GYNECOLOGY | Admitting: OBSTETRICS & GYNECOLOGY
Payer: COMMERCIAL

## 2022-12-27 ENCOUNTER — OFFICE VISIT (OUTPATIENT)
Dept: CARDIOLOGY | Facility: CLINIC | Age: 27
End: 2022-12-27
Payer: COMMERCIAL

## 2022-12-27 DIAGNOSIS — O35.BXX0 FETAL CARDIAC DISEASE AFFECTING PREGNANCY, SINGLE OR UNSPECIFIED FETUS: Primary | ICD-10-CM

## 2022-12-27 DIAGNOSIS — O35.8XX0 CYSTIC HYGROMA OF FETUS IN SINGLETON PREGNANCY: ICD-10-CM

## 2022-12-27 PROCEDURE — 76825 ECHO EXAM OF FETAL HEART: CPT | Mod: 26 | Performed by: PEDIATRICS

## 2022-12-27 PROCEDURE — 93325 DOPPLER ECHO COLOR FLOW MAPG: CPT | Mod: 26 | Performed by: PEDIATRICS

## 2022-12-27 PROCEDURE — 76827 ECHO EXAM OF FETAL HEART: CPT | Mod: 26 | Performed by: PEDIATRICS

## 2022-12-27 PROCEDURE — 93325 DOPPLER ECHO COLOR FLOW MAPG: CPT

## 2022-12-27 PROCEDURE — 99202 OFFICE O/P NEW SF 15 MIN: CPT | Mod: 25 | Performed by: PEDIATRICS

## 2022-12-27 NOTE — PROGRESS NOTES
[facility]   Heart Center Fetal Consult Note    Patient:  Regina Mays MRN:  [id number]   YOB: 1995 Age:  27 year old   Date of Visit:  2022 PCP:  No Ref-Primary, Physician     Dear Doctor,     I had the pleasure of seeing Regina Mays at the [facility] on 2022 in fetal cardiology consultation for fetal echocardiogram results. She presented today accompanied by her . As you know, she is a 27 year old  at 21w3d who presented for fetal echocardiogram today because of cystic hygroma.    I performed and interpreted the fetal echocardiogram today, which demonstrated normal fetal cardiac anatomy. Normal fetal intracardiac connections. Normal right and left ventricular size and function. No hydrops.     I reviewed the echo findings today with Regina Mays. She is aware that the study was within normal limits with no major cardiac abnormalities. She is aware of the general limitations of fetal echocardiography. No additional fetal echocardiograms are recommended. No  cardiac follow-up is required.     Thank you for allowing me to participate in Regina's care. Please do not hesitate to contact me with questions or concerns.    This visit was separate from the performance and interpretation of the ultrasound. The majority of the time (>50%) was spent in counseling and coordination of care. I spent approximately 20 minutes in face-to-face time reviewing the above considerations.    Jessica Navarro M.D.  Pediatric Cardiology  [address], 5th floor, [facility] [address]  Phone [phone]  Fax [phone]

## 2023-01-10 ENCOUNTER — PRENATAL OFFICE VISIT (OUTPATIENT)
Dept: MIDWIFE SERVICES | Facility: CLINIC | Age: 28
End: 2023-01-10
Payer: COMMERCIAL

## 2023-01-10 VITALS
BODY MASS INDEX: 27.45 KG/M2 | WEIGHT: 159.9 LBS | HEART RATE: 93 BPM | DIASTOLIC BLOOD PRESSURE: 63 MMHG | TEMPERATURE: 97.9 F | SYSTOLIC BLOOD PRESSURE: 102 MMHG

## 2023-01-10 DIAGNOSIS — O26.899 RH NEGATIVE STATE IN ANTEPARTUM PERIOD: ICD-10-CM

## 2023-01-10 DIAGNOSIS — Z34.02 ENCOUNTER FOR SUPERVISION OF NORMAL FIRST PREGNANCY IN SECOND TRIMESTER: Primary | ICD-10-CM

## 2023-01-10 DIAGNOSIS — Z67.91 RH NEGATIVE STATE IN ANTEPARTUM PERIOD: ICD-10-CM

## 2023-01-10 PROCEDURE — 99207 PR PRENATAL VISIT: CPT | Performed by: ADVANCED PRACTICE MIDWIFE

## 2023-01-10 NOTE — PROGRESS NOTES
23w3d   Pt here with Carloz, has had echo WNL and MSAFP WNL, couple states they feel reassured and excited for this next part of pregnancy.  Has been working with a pregnancy PT as part of birth preparation including exercises, birth prep and perineal massage.  Plans to come next week for GCT, hgb and kait screen.  Will need rhogam @ 28 wks.  RTC for labappt next week then with CNM in 4 wks JORGE Hernandez CNM

## 2023-01-16 ENCOUNTER — LAB (OUTPATIENT)
Dept: LAB | Facility: CLINIC | Age: 28
End: 2023-01-16
Payer: COMMERCIAL

## 2023-01-16 DIAGNOSIS — Z34.02 ENCOUNTER FOR SUPERVISION OF NORMAL FIRST PREGNANCY IN SECOND TRIMESTER: ICD-10-CM

## 2023-01-16 DIAGNOSIS — O26.899 RH NEGATIVE STATE IN ANTEPARTUM PERIOD: ICD-10-CM

## 2023-01-16 DIAGNOSIS — Z67.91 RH NEGATIVE STATE IN ANTEPARTUM PERIOD: ICD-10-CM

## 2023-01-16 LAB
ANTIBODY ID: NORMAL
ANTIBODY SCREEN: POSITIVE
GLUCOSE 1H P 50 G GLC PO SERPL-MCNC: 59 MG/DL (ref 70–129)
HGB BLD-MCNC: 11.3 G/DL (ref 11.7–15.7)
SPECIMEN EXPIRATION DATE: ABNORMAL
SPECIMEN EXPIRATION DATE: NORMAL

## 2023-01-16 PROCEDURE — 86850 RBC ANTIBODY SCREEN: CPT

## 2023-01-16 PROCEDURE — 86870 RBC ANTIBODY IDENTIFICATION: CPT

## 2023-01-16 PROCEDURE — 36415 COLL VENOUS BLD VENIPUNCTURE: CPT

## 2023-01-16 PROCEDURE — 82950 GLUCOSE TEST: CPT

## 2023-01-24 ENCOUNTER — VIRTUAL VISIT (OUTPATIENT)
Dept: INTERNAL MEDICINE | Facility: CLINIC | Age: 28
End: 2023-01-24
Payer: COMMERCIAL

## 2023-01-24 ENCOUNTER — TELEPHONE (OUTPATIENT)
Dept: MIDWIFE SERVICES | Facility: CLINIC | Age: 28
End: 2023-01-24

## 2023-01-24 DIAGNOSIS — U07.1 INFECTION DUE TO 2019 NOVEL CORONAVIRUS: Primary | ICD-10-CM

## 2023-01-24 DIAGNOSIS — Z53.9 NO SHOW: Primary | ICD-10-CM

## 2023-01-24 NOTE — PROGRESS NOTES
No show to scheduled virtual visit. It appears another provider prescribed Paxlovid to her prior to this visit - unclear why this visit was scheduled.    Pierce Lovett MD, MPH  [facility]  Internal Medicine

## 2023-01-24 NOTE — TELEPHONE ENCOUNTER
Called Regina Mays called r/t new onset covid symptoms with positive home Covid test. Symptoms feels like a headcold with congestion, mild sore throat, and headache. Denies respiratory symptoms. Discussed paxlovid tx which she qualifies for r/t her pregnancy status. We discussed that taking paxlovid reduces the risk of covid of causing hospitalization from severe symptoms and morbidity. We also discussed that we would recommend a growth ultrasound in 6 wks to check on baby's growth r/t her covid infection now. After our discussion she thinks she will take paxlovid and accepts a prescription. She was counseled to call or come to the ED w/ any severe symptoms like shortness of breath, increased respiratory rate, severe fever. Okay to take tylenol.     Queenie Wing, GENNA, JORGE GALAVIZM

## 2023-01-24 NOTE — TELEPHONE ENCOUNTER
Advise pt to schedule e-visit to discuss treatment. Gave OTC medication recommendations.    Forwarding to you as an FYI     Ara Santa RN

## 2023-01-25 ENCOUNTER — TELEPHONE (OUTPATIENT)
Dept: MIDWIFE SERVICES | Facility: CLINIC | Age: 28
End: 2023-01-25
Payer: COMMERCIAL

## 2023-01-25 NOTE — TELEPHONE ENCOUNTER
Reason for call:  Symptom   Symptom or request: Covid positive and vomiting    Duration (how long have symptoms been present): Since last night  Have you been treated for this before? No    Additional comments: Pt 25 weeks tested positive for Covid yesterday. Also has been puking all night and is wondering if there's anything else she can do to help.    Phone number to reach patient:  Home number on file [phone] (home)    Best Time:  Any    Can we leave a detailed message on this number?  YES    Travel screening: Not Applicable

## 2023-01-31 ENCOUNTER — TELEPHONE (OUTPATIENT)
Dept: MIDWIFE SERVICES | Facility: CLINIC | Age: 28
End: 2023-01-31
Payer: COMMERCIAL

## 2023-01-31 NOTE — TELEPHONE ENCOUNTER
26w3d  Rh Negative    covid last week  Paxlovid x5 days    Spotting on the underwear this morning   Brown   No cramping  Pt has showered and has not noticed any additional spotting.    Rhogam given 11/1/22    Does she need to repeat Rhogam injection?    I advised patient to monitor spotting for not and to let us know if it continues to if she develops any cramping.  Any other recommendations?    Thanks!  Ara Santa RN

## 2023-01-31 NOTE — TELEPHONE ENCOUNTER
Agree with RN plan. No need for Rhogam to be repeated for brown spotting. Notify team if bleeding or cramping occurs.  Vasiliy Monroy CNM

## 2023-01-31 NOTE — TELEPHONE ENCOUNTER
Physical Therapy Evaluation    Visit Count: 1  Plan of Care Dates: Initial: 6/15/2018   Insurance Information: [employer]  80/20  30 pt/ot/st  Next Referring Provider Visit: 6-29-18    Referred by: Sandi Becker DO  Medical Diagnosis (from order): S83.411A Sprain of medial collateral ligament of right knee, initial encounter  S86.811A Strain of right patellar tendon, initial encounter  M25.561 Acute pain of right knee   Treatment Diagnosis: Knee Symptoms with Pain, Impaired Range of Motion, Impaired Motor Function/Muscle Performance, Impaired Gait/Locomotion Deficits, Impaired Mobility and Impaired Balance  Insurance: 1. [employer]  2. N/A    Date of onset/injury: 5-19-18  Diagnosis Precautions: no leg press, squats  Chart reviewed: Relevant co-morbidities, allergies, tests and medications: anti-inflammatory, neck fusion,  RTC repair    SUBJECTIVE   Description of Problem/Mechanism of Injury: Pt states was in a wrestling match and jumped up to celebrate and landed wrong on the right side. Saw walk in [facility] order hinged brace. Don't wear it anymore, instead a smaller brace. After 5-6 hours at work it hurts pretty bad, work on a metal press. Since onset of injury it is about 50% better. Pivoting hurts yet. Saw Dr. Becker and she said no squats and leg press. Did do light walking on treadmill.  Have had about 3-4 times where it felt like it wanted to give out on me but no falls. Do wrestling as a side job/hobby.   Pain:  Intensity: Now: 0/10; Best: 0/10; Worst: 7/10 (in the last 2 weeks)  Location: medial right knee, occasional deep knee pain  Quality/Description: Ache, Sharp, Shooting, Sore  Relieving/Alleviating factors: ice      Function:  Limitations and exacerbating factors (patient reported): pain, difficulty, increased time to complete with all weight bearing activities/tasks with involved extremity, bed mobility, grocery shopping, house/yard work , kneeling, meal prep/standing tasks, sleep disturbed, ascending  Reason for call:  Symptom   Symptom or request: Spotting during pregnancy    Duration (how long have symptoms been present): Since this morning  Have you been treated for this before? No    Additional comments: Pt is 26 weeks, said she woke up this morning and noticed some bleeding.    Phone number to reach patient:  Cell number on file:    Telephone Information:   Mobile [phone]           Best Time:  Any    Can we leave a detailed message on this number?  YES    Travel screening: Not Applicable     and descending stairs using reciprocal pattern, squatting/lifting, transition from a period of sitting, walking quickly as required to cross a street/exit a building rapidly  Prior level (patient reported): independent with all activities of daily living and instrumental activities of daily living, ambulating without assistive device, living independently    Prior Treatment: no therapies in the past year for current condition. Hospitalization, home health services or skilled nursing facility in the last 30 days: No, per patient.    Home Environment/Social Support: Patient lives with significant other.   Patient has no assistance from family/friends.      Safety:  Do you feel safe at home, work and/or school? yes, per patient  Falls: balance history in last year (< or equal to 2 falls/near falls and/or reasons) does not indicate further testing    Patient Goals/Concerns:  Be close to 100% with pain and strength    OBJECTIVE   Posture/Observation:  No swelling or discoloration noted medial knee. Presents without brace    Range of Motion (degrees)  [] All motions within functional/normal limits except those noted.   [] Only those motions that were assessed are noted.  [] Uninvolved extremity motion within functional/normal limits.    Norm Left Right   Date  Initial Initial   Knee Flexion 135  135 124*   Knee Extension 0-5 0 2   standard testing positions unless otherwise noted; Key: ranges are reported in active range of motion unless noted as AA=active assistive or P=passive range of motion, * denotes pain   Comments:     Strength (out of 5)  [] Gross muscle strength within functional/normal limits except as noted.  [] Only muscle strength that was assessed are noted.  [] Uninvolved muscle strength within functional/normal limits.   Left Right   Date Initial   Initial   Knee Flexion 5 4+   Knee Extension 5 4+*   standard testing positions unless otherwise noted,* denotes pain  Comments:        Palpation:  Tender along  right MCL especially with longitudinal palpation  Negative joint line tenderness    Special Tests:  Valgus Stress Test: Positive right for MCL instability  Varus Stress Test: Negative for LCL instability  Apley's Test: Negative for meniscus lesion  Bounce Home Test: Negative for meniscus lesion  Martha's Test: Negative for meniscus lesion    Gait Analysis:  Normalized short distance in clinic      Outcome Measures:   Lower Extremity Functional Scale: LEFS Calculated Total: 47 (0=extreme difficulty; 80=no difficulty)     Initial Treatment   Initial evaluation completed.    Therapeutic Exercise:   SLR with 3 sec hold x 12 (2 sets)    Therapeutic Activity:  Education on diagnosis, prognosis, POC, role of PT, education on US treatment, education on gym and staying away from pain with LE lifting.     Ultrasound (37200):  Patient has been made aware of potential contraindications and possible risks associated with the use of modality and has agreed.  Location: right medial knee/MCL  Position: supine  Duration: 8 minutes Duty Cycle: 50% duty cycle  Frequency: 1 Mhz  Intensity: 1.3 W/cm2   Results: decreased pain; no adverse reaction to treatment    Initial Home Program:  * above denotes home program issuance as well  SLR  Clam shells (from Dr. Becker)    Plan for next session: assess effects of US and cont if helpful. Isolated quad strengthening and knee flexion keeping low pain levels    ASSESSMENT   35 year old male presents to therapy with significant decline in prior level of function due to signs and symptoms consistent with right knee pain with MCL strain causing decreased knee flexion, knee pain, instability limiting his ability to perform chores, yard work, squatting.     Outcome:    Benefit from skilled therapy: yes   Rehab potential is good due to positive factors age and negative factors not apparent at this time    Predicted patient presentation: Low (stable) - Patient comorbidities and complexities, as  defined above, will have little effect on progress for prescribed plan of care.  Plan of care to increase strength/stability, increase range of motion, decrease pain, improve activity tolerance, improve activities of daily living and instrumental activites of daily living, improve functional independence to address functional limitations listed above.    Goals:  To be obtained by end of this plan of care:  1. Patient independent with modified and progressed home exercise program.  2. Patient will decrease involved knee pain to 0-1/10  to aid in ambulating on level and unlevel surfaces.   3. Patient will increase involved knee active range of motion to 135° to aid in squatting for lifting for household independent living.   4. Patient will increase involved knee strength to 5/5 to aid in completion of household tasks for independent living and squatting for lifting for household independent living.  5. Patient will demonstrate ability to negotiate level and unlevel surfaces at variable velocities, including change of direction without increased pain or instability to return to age appropriate and community activities at prior level of function.    PLAN   Frequency/Duration: 1 times per week for 6 weeks with tapering as the patient progresses  Skilled training and instruction for the following interventions:  Manual Therapy (18268)  Neuromuscular Re-Education (32905)  Therapeutic Activity (03060)  Therapeutic Exercise (27317)  Ultrasound/Phonophoresis (80535)    The plan of care and goals were established with the patient who concurs.  Patient has been given attendance policy at time of initial evaluation.    Patient Education:  Who will be receiving education: patient  Are they ready to learn: yes  Preferred learning style: written, verbal, demonstration  Barriers to learning: no barriers apparent at this time   Result of initial outlined education: Verbalizes understanding and Demonstrates understanding    THERAPY  DAILY BILLING   Primary Insurance: [employer]  Secondary Insurance: N/A    Evaluation Procedures:  Physical Therapy Evaluation: Low Complexity    Timed Procedures:  Therapeutic Activity, 10 minutes  Therapeutic Exercise, 5 minutes  Ultrasound, 8 minutes + set up    Untimed Procedures:  No untimed codes were used on this date of service    Total Treatment Time: 40 minutes    Routine safety standards followed per [facility] system and site policies

## 2023-02-07 ENCOUNTER — PRENATAL OFFICE VISIT (OUTPATIENT)
Dept: MIDWIFE SERVICES | Facility: CLINIC | Age: 28
End: 2023-02-07
Payer: COMMERCIAL

## 2023-02-07 VITALS
BODY MASS INDEX: 28.13 KG/M2 | WEIGHT: 163.9 LBS | SYSTOLIC BLOOD PRESSURE: 105 MMHG | HEART RATE: 114 BPM | TEMPERATURE: 99.4 F | DIASTOLIC BLOOD PRESSURE: 67 MMHG

## 2023-02-07 DIAGNOSIS — Z34.02 ENCOUNTER FOR SUPERVISION OF NORMAL FIRST PREGNANCY IN SECOND TRIMESTER: Primary | ICD-10-CM

## 2023-02-07 DIAGNOSIS — U07.1 INFECTION DUE TO 2019 NOVEL CORONAVIRUS: ICD-10-CM

## 2023-02-07 DIAGNOSIS — Z00.00 ROUTINE GENERAL MEDICAL EXAMINATION AT A HEALTH CARE FACILITY: ICD-10-CM

## 2023-02-07 PROCEDURE — 90715 TDAP VACCINE 7 YRS/> IM: CPT | Performed by: ADVANCED PRACTICE MIDWIFE

## 2023-02-07 PROCEDURE — 90471 IMMUNIZATION ADMIN: CPT | Performed by: ADVANCED PRACTICE MIDWIFE

## 2023-02-07 PROCEDURE — 99207 PR PRENATAL VISIT: CPT | Performed by: ADVANCED PRACTICE MIDWIFE

## 2023-02-07 NOTE — PROGRESS NOTES
27w3d  Patient is feeling well. Positive fetal movement. Denies water leaking, vaginal bleeding, decreased fetal movement, contraction pain, or headaches.   Tdap today. Out of Rhogam today, will give next time. Growth ultrasound ordered due to covid. Traveling to her brother's wedding, discussed travel guidelines. Getting over covid. Having heartburn, discussed comfort measures, pepcid/omeprazole/tums. No other questions or concerns at this time.   Danger signs reviewed, pre-eclampsia signs and symptoms discussed.   Knows when to call triage and has phone numbers.   Follow up in 4 weeks.   JORGE Rodriguez CNM

## 2023-03-06 ENCOUNTER — APPOINTMENT (OUTPATIENT)
Dept: ULTRASOUND IMAGING | Facility: CLINIC | Age: 28
End: 2023-03-06
Attending: EMERGENCY MEDICINE
Payer: COMMERCIAL

## 2023-03-06 ENCOUNTER — TELEPHONE (OUTPATIENT)
Dept: MIDWIFE SERVICES | Facility: CLINIC | Age: 28
End: 2023-03-06
Payer: COMMERCIAL

## 2023-03-06 ENCOUNTER — APPOINTMENT (OUTPATIENT)
Dept: CT IMAGING | Facility: CLINIC | Age: 28
End: 2023-03-06
Payer: COMMERCIAL

## 2023-03-06 ENCOUNTER — HOSPITAL ENCOUNTER (EMERGENCY)
Facility: CLINIC | Age: 28
Discharge: HOME | End: 2023-03-06
Attending: EMERGENCY MEDICINE | Admitting: EMERGENCY MEDICINE
Payer: COMMERCIAL

## 2023-03-06 VITALS
HEART RATE: 102 BPM | BODY MASS INDEX: 29.18 KG/M2 | TEMPERATURE: 98.6 F | RESPIRATION RATE: 16 BRPM | OXYGEN SATURATION: 96 % | DIASTOLIC BLOOD PRESSURE: 70 MMHG | SYSTOLIC BLOOD PRESSURE: 111 MMHG | WEIGHT: 170 LBS

## 2023-03-06 DIAGNOSIS — R00.2 PALPITATIONS: ICD-10-CM

## 2023-03-06 DIAGNOSIS — Z33.1 PREGNANCY, INCIDENTAL: ICD-10-CM

## 2023-03-06 LAB
ALBUMIN SERPL BCG-MCNC: 3.7 G/DL (ref 3.5–5.2)
ALP SERPL-CCNC: 85 U/L (ref 35–104)
ALT SERPL W P-5'-P-CCNC: 12 U/L (ref 10–35)
ANION GAP SERPL CALCULATED.3IONS-SCNC: 12 MMOL/L (ref 7–15)
AST SERPL W P-5'-P-CCNC: 14 U/L (ref 10–35)
ATRIAL RATE - MUSE: 87 BPM
BASOPHILS # BLD AUTO: 0 10E3/UL (ref 0–0.2)
BASOPHILS NFR BLD AUTO: 0 %
BILIRUB SERPL-MCNC: <0.2 MG/DL
BUN SERPL-MCNC: 5.1 MG/DL (ref 6–20)
CALCIUM SERPL-MCNC: 8.8 MG/DL (ref 8.6–10)
CHLORIDE SERPL-SCNC: 102 MMOL/L (ref 98–107)
CREAT SERPL-MCNC: 0.47 MG/DL (ref 0.51–0.95)
D DIMER PPP FEU-MCNC: 0.87 UG/ML FEU (ref 0–0.5)
DEPRECATED HCO3 PLAS-SCNC: 22 MMOL/L (ref 22–29)
DIASTOLIC BLOOD PRESSURE - MUSE: NORMAL MMHG
EOSINOPHIL # BLD AUTO: 0.1 10E3/UL (ref 0–0.7)
EOSINOPHIL NFR BLD AUTO: 1 %
ERYTHROCYTE [DISTWIDTH] IN BLOOD BY AUTOMATED COUNT: 13.3 % (ref 10–15)
GFR SERPL CREATININE-BSD FRML MDRD: >90 ML/MIN/1.73M2
GLUCOSE SERPL-MCNC: 100 MG/DL (ref 70–99)
HCT VFR BLD AUTO: 34.3 % (ref 35–47)
HGB BLD-MCNC: 11.7 G/DL (ref 11.7–15.7)
IMM GRANULOCYTES # BLD: 0.1 10E3/UL
IMM GRANULOCYTES NFR BLD: 1 %
INTERPRETATION ECG - MUSE: NORMAL
LYMPHOCYTES # BLD AUTO: 2.4 10E3/UL (ref 0.8–5.3)
LYMPHOCYTES NFR BLD AUTO: 22 %
MCH RBC QN AUTO: 30.1 PG (ref 26.5–33)
MCHC RBC AUTO-ENTMCNC: 34.1 G/DL (ref 31.5–36.5)
MCV RBC AUTO: 88 FL (ref 78–100)
MONOCYTES # BLD AUTO: 0.8 10E3/UL (ref 0–1.3)
MONOCYTES NFR BLD AUTO: 8 %
NEUTROPHILS # BLD AUTO: 7.6 10E3/UL (ref 1.6–8.3)
NEUTROPHILS NFR BLD AUTO: 68 %
NRBC # BLD AUTO: 0 10E3/UL
NRBC BLD AUTO-RTO: 0 /100
NT-PROBNP SERPL-MCNC: 52 PG/ML (ref 0–450)
P AXIS - MUSE: 5 DEGREES
PLATELET # BLD AUTO: 308 10E3/UL (ref 150–450)
POTASSIUM SERPL-SCNC: 3.3 MMOL/L (ref 3.4–5.3)
PR INTERVAL - MUSE: 150 MS
PROT SERPL-MCNC: 6.3 G/DL (ref 6.4–8.3)
QRS DURATION - MUSE: 80 MS
QT - MUSE: 382 MS
QTC - MUSE: 459 MS
R AXIS - MUSE: 31 DEGREES
RBC # BLD AUTO: 3.89 10E6/UL (ref 3.8–5.2)
SARS-COV-2 RNA RESP QL NAA+PROBE: NEGATIVE
SODIUM SERPL-SCNC: 136 MMOL/L (ref 136–145)
SYSTOLIC BLOOD PRESSURE - MUSE: NORMAL MMHG
T AXIS - MUSE: 31 DEGREES
TROPONIN T SERPL HS-MCNC: <6 NG/L
VENTRICULAR RATE- MUSE: 87 BPM
WBC # BLD AUTO: 11 10E3/UL (ref 4–11)

## 2023-03-06 PROCEDURE — 99285 EMERGENCY DEPT VISIT HI MDM: CPT | Mod: CS | Performed by: EMERGENCY MEDICINE

## 2023-03-06 PROCEDURE — 250N000009 HC RX 250: Performed by: OBSTETRICS & GYNECOLOGY

## 2023-03-06 PROCEDURE — 258N000003 HC RX IP 258 OP 636: Performed by: EMERGENCY MEDICINE

## 2023-03-06 PROCEDURE — 76805 OB US >/= 14 WKS SNGL FETUS: CPT

## 2023-03-06 PROCEDURE — 36415 COLL VENOUS BLD VENIPUNCTURE: CPT | Performed by: EMERGENCY MEDICINE

## 2023-03-06 PROCEDURE — C9803 HOPD COVID-19 SPEC COLLECT: HCPCS | Performed by: EMERGENCY MEDICINE

## 2023-03-06 PROCEDURE — 99285 EMERGENCY DEPT VISIT HI MDM: CPT | Mod: CS,25 | Performed by: EMERGENCY MEDICINE

## 2023-03-06 PROCEDURE — 59025 FETAL NON-STRESS TEST: CPT | Mod: 26 | Performed by: OBSTETRICS & GYNECOLOGY

## 2023-03-06 PROCEDURE — 93010 ELECTROCARDIOGRAM REPORT: CPT | Performed by: EMERGENCY MEDICINE

## 2023-03-06 PROCEDURE — 85379 FIBRIN DEGRADATION QUANT: CPT | Performed by: EMERGENCY MEDICINE

## 2023-03-06 PROCEDURE — 96361 HYDRATE IV INFUSION ADD-ON: CPT | Performed by: EMERGENCY MEDICINE

## 2023-03-06 PROCEDURE — 85025 COMPLETE CBC W/AUTO DIFF WBC: CPT | Performed by: EMERGENCY MEDICINE

## 2023-03-06 PROCEDURE — 80053 COMPREHEN METABOLIC PANEL: CPT | Performed by: EMERGENCY MEDICINE

## 2023-03-06 PROCEDURE — 93005 ELECTROCARDIOGRAM TRACING: CPT | Performed by: EMERGENCY MEDICINE

## 2023-03-06 PROCEDURE — 83880 ASSAY OF NATRIURETIC PEPTIDE: CPT | Performed by: EMERGENCY MEDICINE

## 2023-03-06 PROCEDURE — 84484 ASSAY OF TROPONIN QUANT: CPT | Performed by: EMERGENCY MEDICINE

## 2023-03-06 PROCEDURE — U0003 INFECTIOUS AGENT DETECTION BY NUCLEIC ACID (DNA OR RNA); SEVERE ACUTE RESPIRATORY SYNDROME CORONAVIRUS 2 (SARS-COV-2) (CORONAVIRUS DISEASE [COVID-19]), AMPLIFIED PROBE TECHNIQUE, MAKING USE OF HIGH THROUGHPUT TECHNOLOGIES AS DESCRIBED BY CMS-2020-01-R: HCPCS | Performed by: EMERGENCY MEDICINE

## 2023-03-06 PROCEDURE — 99214 OFFICE O/P EST MOD 30 MIN: CPT | Mod: 25 | Performed by: OBSTETRICS & GYNECOLOGY

## 2023-03-06 PROCEDURE — 71275 CT ANGIOGRAPHY CHEST: CPT

## 2023-03-06 PROCEDURE — 250N000011 HC RX IP 250 OP 636: Performed by: OBSTETRICS & GYNECOLOGY

## 2023-03-06 PROCEDURE — 96360 HYDRATION IV INFUSION INIT: CPT | Mod: 59 | Performed by: EMERGENCY MEDICINE

## 2023-03-06 RX ORDER — SODIUM CHLORIDE 9 MG/ML
INJECTION, SOLUTION INTRAVENOUS CONTINUOUS
Status: DISCONTINUED | OUTPATIENT
Start: 2023-03-06 | End: 2023-03-06 | Stop reason: HOSPADM

## 2023-03-06 RX ORDER — IOPAMIDOL 755 MG/ML
100 INJECTION, SOLUTION INTRAVASCULAR ONCE
Status: COMPLETED | OUTPATIENT
Start: 2023-03-06 | End: 2023-03-06

## 2023-03-06 RX ADMIN — IOPAMIDOL 64 ML: 755 INJECTION, SOLUTION INTRAVENOUS at 17:50

## 2023-03-06 RX ADMIN — SODIUM CHLORIDE 84 ML: 9 INJECTION, SOLUTION INTRAVENOUS at 17:51

## 2023-03-06 RX ADMIN — SODIUM CHLORIDE 1000 ML: 9 INJECTION, SOLUTION INTRAVENOUS at 15:17

## 2023-03-06 ASSESSMENT — ACTIVITIES OF DAILY LIVING (ADL)
ADLS_ACUITY_SCORE: 35

## 2023-03-06 NOTE — TELEPHONE ENCOUNTER
Called L&D discuss pt, want her to go to ER first and RN will evaluate there d/t SOB symptoms  Pt updated to go to ER first, paged GENNA Nice update.   Sarah Welch RN on 3/6/2023 at 1:25 PM

## 2023-03-06 NOTE — PROGRESS NOTES
RN called to bedside in ED for fetal monitoring as pt is 31 wks 3 days pregnant. EFM and toco applied. Pt denies feeling contractions, LOF, vaginal bleeding. Active fetal movement. NST performed, reactive. No contractions noted on monitor. FHT WNL, accelerations present, moderate variability, no decelerations present. ER MD requesting BSUS, RN gave him OB MD phone number to make consult.

## 2023-03-06 NOTE — TELEPHONE ENCOUNTER
31w2d    Pt called regarding symptoms coming in waves this AM  This AM had some heart racing, lightheaded and mildly SOB  Sat down, didn't help, tried to walk around, feels very winded-  Lied down for 1 hour, felt better had meeting but feels it's ramping up again, diaphoretic, nauseous.   Denies chest pain  Trying to drink lots of water today, eaten 2 meals today.     Routing to provider to advise.  Sarah Welch RN on 3/6/2023 at 12:59 PM
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
## 2023-03-06 NOTE — ED NOTES
Ambulated the patient to the bathroom while wearing pulse oximetry. While at rest pt oxygen saturations between % on room air. While ambulating, pt's oxygen saturation dropped to 92-94% on Room air. Pt did report becoming short of breath while walking. Of note, pt does have matte gel/dipped nails that may interfere with the efficacy of the pulse oximetry sensor.

## 2023-03-06 NOTE — ED TRIAGE NOTES
Pt presets to the ER reporting signs of lightheadedness and trouble breathing that began this morning. Feels like their heart is racing occasionally. Nausea, no vomiting. 31 weeks pregnant     Triage Assessment     Row Name 03/06/23 1400       Triage Assessment (Adult)    Airway WDL WDL       Respiratory WDL    Respiratory WDL WDL       Skin Circulation/Temperature WDL    Skin Circulation/Temperature WDL WDL       Cardiac WDL    Cardiac WDL WDL       Peripheral/Neurovascular WDL    Peripheral Neurovascular WDL WDL       Cognitive/Neuro/Behavioral WDL    Cognitive/Neuro/Behavioral WDL WDL

## 2023-03-06 NOTE — TELEPHONE ENCOUNTER
Patient 31w2d pregnant, been experiencing a number of symptoms that are worrisome to the patient.

## 2023-03-07 ENCOUNTER — PRENATAL OFFICE VISIT (OUTPATIENT)
Dept: MIDWIFE SERVICES | Facility: CLINIC | Age: 28
End: 2023-03-07
Attending: ADVANCED PRACTICE MIDWIFE
Payer: COMMERCIAL

## 2023-03-07 VITALS
OXYGEN SATURATION: 97 % | WEIGHT: 171 LBS | HEART RATE: 88 BPM | BODY MASS INDEX: 29.35 KG/M2 | DIASTOLIC BLOOD PRESSURE: 67 MMHG | SYSTOLIC BLOOD PRESSURE: 115 MMHG

## 2023-03-07 DIAGNOSIS — Z67.91 RH NEGATIVE STATUS DURING PREGNANCY: ICD-10-CM

## 2023-03-07 DIAGNOSIS — O26.899 RH NEGATIVE STATUS DURING PREGNANCY: ICD-10-CM

## 2023-03-07 DIAGNOSIS — Z34.03 ENCOUNTER FOR SUPERVISION OF NORMAL FIRST PREGNANCY IN THIRD TRIMESTER: Primary | ICD-10-CM

## 2023-03-07 PROCEDURE — 99207 PR PRENATAL VISIT: CPT | Performed by: ADVANCED PRACTICE MIDWIFE

## 2023-03-07 PROCEDURE — 96372 THER/PROPH/DIAG INJ SC/IM: CPT | Performed by: ADVANCED PRACTICE MIDWIFE

## 2023-03-07 RX ORDER — FAMOTIDINE 40 MG/1
40 TABLET, FILM COATED ORAL DAILY
COMMUNITY
End: 2023-04-20

## 2023-03-07 NOTE — PROGRESS NOTES
31w3d   Feeling well today, yesterday she didn't feel well, tired, lightheaded, SOB.  Had growth U/S yesterday, 66%.  Rhogam today.  Discussed normal physiology of pregnancy, warning s/s of PTL.  RTC 2 wks JORGE HernandezM

## 2023-03-10 ENCOUNTER — TELEPHONE (OUTPATIENT)
Dept: BEHAVIORAL HEALTH | Facility: CLINIC | Age: 28
End: 2023-03-10
Payer: COMMERCIAL

## 2023-03-17 ENCOUNTER — TELEPHONE (OUTPATIENT)
Dept: BEHAVIORAL HEALTH | Facility: CLINIC | Age: 28
End: 2023-03-17
Payer: COMMERCIAL

## 2023-03-21 ENCOUNTER — PRENATAL OFFICE VISIT (OUTPATIENT)
Dept: MIDWIFE SERVICES | Facility: CLINIC | Age: 28
End: 2023-03-21
Payer: COMMERCIAL

## 2023-03-21 VITALS
SYSTOLIC BLOOD PRESSURE: 110 MMHG | WEIGHT: 173 LBS | DIASTOLIC BLOOD PRESSURE: 55 MMHG | BODY MASS INDEX: 29.7 KG/M2 | OXYGEN SATURATION: 100 % | HEART RATE: 86 BPM

## 2023-03-21 DIAGNOSIS — Z34.03 ENCOUNTER FOR SUPERVISION OF NORMAL FIRST PREGNANCY IN THIRD TRIMESTER: Primary | ICD-10-CM

## 2023-03-21 PROCEDURE — 99213 OFFICE O/P EST LOW 20 MIN: CPT | Performed by: ADVANCED PRACTICE MIDWIFE

## 2023-03-21 NOTE — PROGRESS NOTES
33w3d  Regina is here with Carloz, is feeling well. She is feeling baby move, no bleeding, leaking of fluid, headaches beyond her normal. No regular contractions. She was seen in the ED with complaints of SOB/palpitations on 3/6. Had a full workup including labs/chest CT, all were neg. She continues to have similar episodes. She has never had a problem with anxiety, but has noticed more anxiety since becoming pregnant. Is open to the idea that it could be anxiety symptoms. Has an appt soon with KYLIE Kraus. Will plan to discuss some relaxation techniques. If that is unsuccessful, discussed that we could consider cardiac consultation. Reassured by her negative workup in the ED with normal EKG, normal treponins, normal labs and Chest CT ruled out PE. She is planning to have Loli Manning (L&D RN), who is a good friend, with her for labor. Feels good knowing that she will have that support for both her and Carloz. She is doing pelvic floor physical therapy, planning to start discussing labor preparation with them.  Discussed next appt in 2 weeks, then weekly.  Vasiliy Monroy CNM

## 2023-03-24 ENCOUNTER — OFFICE VISIT (OUTPATIENT)
Dept: BEHAVIORAL HEALTH | Facility: CLINIC | Age: 28
End: 2023-03-24
Payer: COMMERCIAL

## 2023-03-24 DIAGNOSIS — F41.8 OTHER SPECIFIED ANXIETY DISORDERS: Primary | ICD-10-CM

## 2023-03-24 PROCEDURE — 90834 PSYTX W PT 45 MINUTES: CPT

## 2023-03-24 NOTE — PROGRESS NOTES
[facility]  2023  Behavioral Health Clinician Progress Note    Patient Name: Regina Mays       Service Type:  Individual      Service Location:   Face to Face in Clinic     Session Start Time: 8:30 AM  Session End Time: 9:20 AM      Session Length: 38 - 52      Attendees: Patient     Service Modality:  In-person    Visit Activities (Refresh list every visit): [facility] and [facility] [place] Only    Diagnostic Assessment Date: Not yet completed  Treatment Plan Review Date: Not yet created  See Flowsheets for today's PHQ-9 and CONNOR-7 results  Previous PHQ-9: No flowsheet data found.  Previous CONNOR-7: No flowsheet data found.    ROSARIO LEVEL:  No flowsheet data found.    DATA  Extended Session (60+ minutes): No  Interactive Complexity: No  Crisis: No  [facility] Patient: No    Treatment Objective(s) Addressed in This Session:  Target Behavior(s): management of anxiety symptoms    Anxiety: will increase understanding of anxiety symptoms, practice relaxation techniques and grounding skills to reduce intensity of anxiety    Current Stressors / Issues:  Patient was referred by nurse midwife for support with  anxiety. She is nearly 34 weeks; this is her second pregnancy, having experienced a miscarriage in 2022. Patient endorsed intermittent panic symptoms which seem to present at random, including shortness of breath, racing heart, sweating, and nausea (she has sought medical care for these symptoms and no physical concerns have been identified). She also endorsed worrying when she notices random physical sensations. Patient denied any history of anxiety or other mental health concerns. She appears receptive to the possibility that these symptoms could be attributable to anxiety and is open to trying therapy/anxiety coping skills to address symptoms. [facility] [place] validated patient's concerns, affirmed her openness to mental health support, provided psycho-education on anxiety and treatment, taught mindfulness  skills, and discussed support interests with patient.     Progress on Treatment Objective(s) / Homework:  New Objective established this session - PREPARATION (Decided to change - considering how); Intervened by negotiating a change plan and determining options / strategies for behavior change, identifying triggers, exploring social supports, and working towards setting a date to begin behavior change    Motivational Interviewing    MI Intervention: Expressed Empathy/Understanding, Supported Autonomy, Collaboration, Evocation, Open-ended questions, Reflections: simple and complex and Change talk (evoked)     Change Talk Expressed by the Patient: Desire to change Reasons to change Taking steps    Provider Response to Change Talk: E - Evoked more info from patient about behavior change, A - Affirmed patient's thoughts, decisions, or attempts at behavior change, R - Reflected patient's change talk and S - Summarized patient's change talk statements    Also provided psychoeducation about behavioral health condition, symptoms, and treatment options    Care Plan review completed: No    Medication Review:  No current psychiatric medications prescribed    Medication Compliance:  NA    Changes in Health Issues:  Currently pregnant - 33w6d  Physical symptoms described above    Chemical Use Review:   Substance Use: Chemical use reviewed, no active concerns identified      Tobacco Use: No current tobacco use.      Assessment: Current Emotional / Mental Status (status of significant symptoms):  Risk status (Self / Other harm or suicidal ideation)  Patient denies a history of suicidal ideation, suicide attempts, self-injurious behavior, homicidal ideation, homicidal behavior and and other safety concerns  Patient denies current fears or concerns for personal safety.  Patient denies current or recent suicidal ideation or behaviors.  Patient denies current or recent homicidal ideation or behaviors.  Patient denies current or recent  self injurious behavior or ideation.  Patient denies other safety concerns.  A safety and risk management plan has not been developed at this time, however patient was encouraged to call [place] [phone] should there be a change in any of these risk factors.    Appearance:   Appropriate   Eye Contact:   Good   Psychomotor Behavior: Normal   Attitude:   Cooperative  Interested Pleasant  Orientation:   All  Speech   Rate / Production: Normal/ Responsive   Volume:  Normal   Mood:    Anxious   Affect:    Appropriate   Thought Content:  Clear   Thought Form:  Coherent  Goal Directed  Logical   Insight:    Good     Diagnoses:  1. Other specified anxiety disorders    Provisional     Collateral Reports Completed:  Not Applicable    Plan: (Homework, other):  Follow-up scheduled for postpartum mood/anxiety check, though patient may schedule sooner if symptoms persist. She will begin using relaxation and grounding skills discussed today ([facility] shared additional tips via [employer]). CD Recommendations: No indications of CD issues.     KEE Mcbride, [facility]

## 2023-04-01 ENCOUNTER — NURSE TRIAGE (OUTPATIENT)
Dept: NURSING | Facility: CLINIC | Age: 28
End: 2023-04-01
Payer: COMMERCIAL

## 2023-04-01 ENCOUNTER — HOSPITAL ENCOUNTER (OUTPATIENT)
Facility: CLINIC | Age: 28
Discharge: HOME | End: 2023-04-02
Attending: ADVANCED PRACTICE MIDWIFE | Admitting: ADVANCED PRACTICE MIDWIFE
Payer: COMMERCIAL

## 2023-04-01 VITALS — RESPIRATION RATE: 18 BRPM | SYSTOLIC BLOOD PRESSURE: 111 MMHG | DIASTOLIC BLOOD PRESSURE: 60 MMHG | TEMPERATURE: 98.1 F

## 2023-04-01 LAB
ALBUMIN MFR UR ELPH: 4 MG/DL (ref 1–14)
CREAT UR-MCNC: 27.8 MG/DL
ERYTHROCYTE [DISTWIDTH] IN BLOOD BY AUTOMATED COUNT: 13 % (ref 10–15)
HCT VFR BLD AUTO: 32.9 % (ref 35–47)
HGB BLD-MCNC: 11.2 G/DL (ref 11.7–15.7)
MCH RBC QN AUTO: 30.1 PG (ref 26.5–33)
MCHC RBC AUTO-ENTMCNC: 34 G/DL (ref 31.5–36.5)
MCV RBC AUTO: 88 FL (ref 78–100)
PLATELET # BLD AUTO: 284 10E3/UL (ref 150–450)
PROT/CREAT 24H UR: 0.14 MG/MG CR (ref 0–0.2)
RBC # BLD AUTO: 3.72 10E6/UL (ref 3.8–5.2)
WBC # BLD AUTO: 12.6 10E3/UL (ref 4–11)

## 2023-04-01 PROCEDURE — 84460 ALANINE AMINO (ALT) (SGPT): CPT | Performed by: ADVANCED PRACTICE MIDWIFE

## 2023-04-01 PROCEDURE — G0463 HOSPITAL OUTPT CLINIC VISIT: HCPCS

## 2023-04-01 PROCEDURE — 85027 COMPLETE CBC AUTOMATED: CPT | Performed by: ADVANCED PRACTICE MIDWIFE

## 2023-04-01 PROCEDURE — 84450 TRANSFERASE (AST) (SGOT): CPT | Performed by: ADVANCED PRACTICE MIDWIFE

## 2023-04-01 PROCEDURE — 84156 ASSAY OF PROTEIN URINE: CPT | Performed by: ADVANCED PRACTICE MIDWIFE

## 2023-04-01 PROCEDURE — 82565 ASSAY OF CREATININE: CPT | Performed by: ADVANCED PRACTICE MIDWIFE

## 2023-04-01 ASSESSMENT — ACTIVITIES OF DAILY LIVING (ADL): ADLS_ACUITY_SCORE: 31

## 2023-04-02 ENCOUNTER — HOSPITAL ENCOUNTER (OUTPATIENT)
Facility: CLINIC | Age: 28
End: 2023-04-02
Admitting: ADVANCED PRACTICE MIDWIFE
Payer: COMMERCIAL

## 2023-04-02 LAB
ALT SERPL W P-5'-P-CCNC: 12 U/L (ref 10–35)
AST SERPL W P-5'-P-CCNC: 16 U/L (ref 10–35)
CREAT SERPL-MCNC: 0.49 MG/DL (ref 0.51–0.95)
GFR SERPL CREATININE-BSD FRML MDRD: >90 ML/MIN/1.73M2

## 2023-04-02 ASSESSMENT — ACTIVITIES OF DAILY LIVING (ADL): ADLS_ACUITY_SCORE: 31

## 2023-04-02 NOTE — TELEPHONE ENCOUNTER
OB Triage Call    Is patient's OB/Midwife with the formerly [facility] or [facility]? [facility]- Proceed with triage     Reason for call: Vision changes - seeing stars    Assessment: Over the last few weeks she has been randomly seeing stars. Once today but has happened a few times this week. BP: 130/82. Pt has been having headache - she does have a history of them. About half the time she has a headache when she sees the stars. Headaches seem to be more frequent.     Plan: Paging on call Midwife Mabel Nice at 8:46pm. Per Midwife - Pt should be seen in L&D now. Pt updated and aware.     Patient plans to deliver at [facility]    Patient's primary OB Provider is Midwife at [facility].      Per protocol recommendations Consult needed for [facility] MD or Midwife.  Patient's primary OB is [facility]. Paged on-call midwife for patient's primary OB clinic (refer to where patient is seen as midwives may go to multiple locations) Mabel Nice to call [facility] back at [phone].  Call returned at 9:05pm and advised on triage assessment. Does midwife recommend L&D evaluation? Yes-  Labor and delivery at [facility] ([phone]) notified of patient's pending arrival. Patient notified to go to L&D by RN         35w0d    Estimated Date of Delivery: May 6, 2023        OB History    Para Term  AB Living   2 0 0 0 1 0   SAB IAB Ectopic Multiple Live Births   0 0 0 0 0      # Outcome Date GA Lbr Alberto/2nd Weight Sex Delivery Anes PTL Lv   2 Current            1 AB 22 9w3d              No results found for: GBS       Conchis eSgundo RN 23 8:31 PM  [facility] [place] Nurse Advisor    Reason for Disposition    [1] Pregnant 20 or more weeks AND [2] headache    Additional Information    Negative: Followed getting substance in the eye    Negative: Foreign body or object is or was lodged in the eye    Negative: Followed an eye injury    Negative: Followed sun lamp or sun exposure (UV  keratitis)    Negative: Yellow or green discharge (pus) in the eye    Negative: SEVERE headache    Negative: Complete loss of vision in 1 or both eyes    Negative: SEVERE eye pain    Negative: [1] Pregnant 20 or more weeks AND [2] new hand or face swelling    Negative: [1] Pregnant 20 or more weeks AND [2] upper abdominal pain lasts > 1 hour    Protocols used: PREGNANCY - VISION LOSS OR CHANGE-A-AH
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
## 2023-04-02 NOTE — DISCHARGE INSTRUCTIONS
Discharge Instruction for Undelivered Patients      You were seen for:  Vision changes  You had (Test or Medicine):Labs drawn for pre-eclampsia      Diet:   Drink 8 to 12 glasses of liquids (milk, juice, water) every day.     Activity:  Call your doctor or nurse midwife if your baby is moving less than usual.     Call your provider if you notice:  Swelling in your face or increased swelling in your hands or legs.  Headaches that are not relieved by Tylenol (acetaminophen).  Changes in your vision (blurring: seeing spots or stars.)  Nausea (sick to your stomach) and vomiting (throwing up).   Weight gain of 5 pounds or more per week.  Heartburn that doesn't go away.  Signs of bladder infection: pain when you urinate (use the toilet), need to go more often and more urgently.  The bag of ortega (rupture of membranes) breaks, or you notice leaking in your underwear.  Bright red blood in your underwear.  Abdominal (lower belly) or stomach pain.  For first baby: Contractions (tightening) less than 5 minutes apart for one hour or more.  Second (plus) baby: Contractions (tightening) less than 10 minutes apart and getting stronger.    Follow-up:  As scheduled in the clinic

## 2023-04-02 NOTE — PROVIDER NOTIFICATION
04/01/23 2153   Provider Notification   Provider Name/Title GALDINO Nice CNM   Method of Notification Electronic Page   Notification Reason Patient Arrived       Patient arrived reporting intermittent vision changes which she descries as stars/spots and headache. She states she has chronic headaches/migraines that she occasionally takes tylenol for. Headaches over the last week have resolved without medication. Reports RUQ cramping. Denies vaginal bleeding or leaking of fluid.

## 2023-04-04 ENCOUNTER — PRENATAL OFFICE VISIT (OUTPATIENT)
Dept: MIDWIFE SERVICES | Facility: CLINIC | Age: 28
End: 2023-04-04
Payer: COMMERCIAL

## 2023-04-04 VITALS
HEART RATE: 85 BPM | SYSTOLIC BLOOD PRESSURE: 114 MMHG | DIASTOLIC BLOOD PRESSURE: 64 MMHG | OXYGEN SATURATION: 100 % | WEIGHT: 177 LBS | BODY MASS INDEX: 30.38 KG/M2

## 2023-04-04 DIAGNOSIS — Z34.03 ENCOUNTER FOR SUPERVISION OF NORMAL FIRST PREGNANCY IN THIRD TRIMESTER: Primary | ICD-10-CM

## 2023-04-04 PROCEDURE — 99207 PR PRENATAL VISIT: CPT | Performed by: ADVANCED PRACTICE MIDWIFE

## 2023-04-04 NOTE — PROGRESS NOTES
35w3d   Patient is feeling well. Positive fetal movement. Denies water leaking, vaginal bleeding, decreased fetal movement, contraction pain, or headaches.   Doing well. Having less palpations but still there, feels like they are anxiety related. Met with Nayana, really liked her. They have another meeting set up postpartum. Discussed continuing to keep a close eye on it and let us know if she feels like she needs something different. Otherwise no concerns or questions today. Just waiting for baby.   Danger signs reviewed, pre-eclampsia signs and symptoms discussed.   Knows when to call triage and has phone numbers.   Follow up in 1 week. GBS, HGB, and BSUS next visit.   JORGE Rodriguez CNM

## 2023-04-06 ASSESSMENT — COLUMBIA-SUICIDE SEVERITY RATING SCALE - C-SSRS
TOTAL  NUMBER OF ABORTED OR SELF INTERRUPTED ATTEMPTS LIFETIME: NO
6. HAVE YOU EVER DONE ANYTHING, STARTED TO DO ANYTHING, OR PREPARED TO DO ANYTHING TO END YOUR LIFE?: NO
ATTEMPT LIFETIME: NO
TOTAL  NUMBER OF INTERRUPTED ATTEMPTS LIFETIME: NO
2. HAVE YOU ACTUALLY HAD ANY THOUGHTS OF KILLING YOURSELF?: NO
1. HAVE YOU WISHED YOU WERE DEAD OR WISHED YOU COULD GO TO SLEEP AND NOT WAKE UP?: NO

## 2023-04-11 ENCOUNTER — PRENATAL OFFICE VISIT (OUTPATIENT)
Dept: MIDWIFE SERVICES | Facility: CLINIC | Age: 28
End: 2023-04-11
Payer: COMMERCIAL

## 2023-04-11 VITALS
TEMPERATURE: 98 F | BODY MASS INDEX: 30.71 KG/M2 | WEIGHT: 178.9 LBS | DIASTOLIC BLOOD PRESSURE: 66 MMHG | HEART RATE: 81 BPM | SYSTOLIC BLOOD PRESSURE: 100 MMHG

## 2023-04-11 DIAGNOSIS — Z34.03 ENCOUNTER FOR SUPERVISION OF NORMAL FIRST PREGNANCY IN THIRD TRIMESTER: Primary | ICD-10-CM

## 2023-04-11 LAB — HGB BLD-MCNC: 11.8 G/DL (ref 11.7–15.7)

## 2023-04-11 PROCEDURE — 99207 PR PRENATAL VISIT: CPT | Performed by: ADVANCED PRACTICE MIDWIFE

## 2023-04-11 PROCEDURE — 36415 COLL VENOUS BLD VENIPUNCTURE: CPT | Performed by: ADVANCED PRACTICE MIDWIFE

## 2023-04-11 PROCEDURE — 87653 STREP B DNA AMP PROBE: CPT | Performed by: ADVANCED PRACTICE MIDWIFE

## 2023-04-11 ASSESSMENT — ANXIETY QUESTIONNAIRES
6. BECOMING EASILY ANNOYED OR IRRITABLE: SEVERAL DAYS
3. WORRYING TOO MUCH ABOUT DIFFERENT THINGS: NOT AT ALL
7. FEELING AFRAID AS IF SOMETHING AWFUL MIGHT HAPPEN: NOT AT ALL
2. NOT BEING ABLE TO STOP OR CONTROL WORRYING: NOT AT ALL
GAD7 TOTAL SCORE: 2
1. FEELING NERVOUS, ANXIOUS, OR ON EDGE: SEVERAL DAYS
GAD7 TOTAL SCORE: 2
IF YOU CHECKED OFF ANY PROBLEMS ON THIS QUESTIONNAIRE, HOW DIFFICULT HAVE THESE PROBLEMS MADE IT FOR YOU TO DO YOUR WORK, TAKE CARE OF THINGS AT HOME, OR GET ALONG WITH OTHER PEOPLE: SOMEWHAT DIFFICULT
5. BEING SO RESTLESS THAT IT IS HARD TO SIT STILL: NOT AT ALL

## 2023-04-11 ASSESSMENT — PATIENT HEALTH QUESTIONNAIRE - PHQ9
5. POOR APPETITE OR OVEREATING: NOT AT ALL
SUM OF ALL RESPONSES TO PHQ QUESTIONS 1-9: 1

## 2023-04-11 NOTE — PROGRESS NOTES
36w3d  Regina is here with Acrloz today. She is feeling really well. Reports good fetal movement. Denies leaking of fluid, vaginal bleeding, regular uterine contractions, headache, visual changes, or other concerns. Feeling ready for labor and has number to call. She is planning to start labor unmedicated and use position changes, back pressure, and massage for support. She is also open to pain medications if needed. Fetus cephalic by BSUS today. GBS and Hgb collected. RTC weekly.    JORGE Castillo CNM

## 2023-04-12 LAB — GP B STREP DNA SPEC QL NAA+PROBE: NEGATIVE

## 2023-04-19 ENCOUNTER — MEDICAL CORRESPONDENCE (OUTPATIENT)
Dept: HEALTH INFORMATION MANAGEMENT | Facility: CLINIC | Age: 28
End: 2023-04-19
Payer: COMMERCIAL

## 2023-04-20 ENCOUNTER — PRENATAL OFFICE VISIT (OUTPATIENT)
Dept: MIDWIFE SERVICES | Facility: CLINIC | Age: 28
End: 2023-04-20
Payer: COMMERCIAL

## 2023-04-20 VITALS
BODY MASS INDEX: 31.48 KG/M2 | WEIGHT: 183.4 LBS | HEART RATE: 100 BPM | SYSTOLIC BLOOD PRESSURE: 121 MMHG | DIASTOLIC BLOOD PRESSURE: 77 MMHG

## 2023-04-20 DIAGNOSIS — Z34.03 ENCOUNTER FOR SUPERVISION OF NORMAL FIRST PREGNANCY IN THIRD TRIMESTER: Primary | ICD-10-CM

## 2023-04-20 PROCEDURE — 99207 PR PRENATAL VISIT: CPT | Performed by: ADVANCED PRACTICE MIDWIFE

## 2023-04-20 RX ORDER — OMEPRAZOLE 10 MG/1
20 CAPSULE, DELAYED RELEASE ORAL DAILY
COMMUNITY
End: 2023-05-16

## 2023-04-21 NOTE — PROGRESS NOTES
Regina is having a URI with nasal congestion and headache from sinus pressure relieved with Tylenol.  Discussed OTC meds for relief prn but is using comfort measures like steam etc.  Encouraged as much rest as possible and nl diet.  No real ctx yet but some back pain that may be related.  GBS is negative.  Reviewed calling in before coming to hospital and speaking to CNM vs FNA for labor advice.  ASSESSMENT: 37w6d  PLAN: Weekly appt until delivery  Elieser PATEL, GENNA

## 2023-04-28 ENCOUNTER — PRENATAL OFFICE VISIT (OUTPATIENT)
Dept: MIDWIFE SERVICES | Facility: CLINIC | Age: 28
End: 2023-04-28
Payer: COMMERCIAL

## 2023-04-28 VITALS
BODY MASS INDEX: 31.58 KG/M2 | OXYGEN SATURATION: 97 % | DIASTOLIC BLOOD PRESSURE: 70 MMHG | HEART RATE: 92 BPM | SYSTOLIC BLOOD PRESSURE: 120 MMHG | WEIGHT: 184 LBS

## 2023-04-28 DIAGNOSIS — Z34.03 ENCOUNTER FOR SUPERVISION OF NORMAL FIRST PREGNANCY, THIRD TRIMESTER: Primary | ICD-10-CM

## 2023-04-28 DIAGNOSIS — Z34.83 ENCOUNTER FOR SUPERVISION OF OTHER NORMAL PREGNANCY, THIRD TRIMESTER: ICD-10-CM

## 2023-04-28 PROCEDURE — 99207 PR PRENATAL VISIT: CPT | Performed by: ADVANCED PRACTICE MIDWIFE

## 2023-04-28 PROCEDURE — 59425 ANTEPARTUM CARE ONLY: CPT | Performed by: ADVANCED PRACTICE MIDWIFE

## 2023-04-28 NOTE — PATIENT INSTRUCTIONS
You have been provided the My Labor and Birth Wishes document.  Please review at home and bring to your next prenatal visit. Bring this sheet to the hospital for your birth. Give copies to your care team members and support person.   Additional copies can be found here:  [email or web address]  You have been provided the Any Day Now: Early Labor at Home document.    Additional copies can be found here:  [email or web address]  You have been provided the What I'd Wish I'd Known About Giving Birth document.    Additional copies can be found here:  [email or web address]

## 2023-04-28 NOTE — PROGRESS NOTES
38w6d  Feeling well. Ready for baby. Gave HAs for early labor, birth wishes, and what I wish I had know about labor. Reports good fetal movement. Denies leaking of fluid, vaginal bleeding, regular uterine contractions, headache or other concerns.   [place] [facility] [place]

## 2023-05-05 ENCOUNTER — HOSPITAL ENCOUNTER (INPATIENT)
Facility: CLINIC | Age: 28
LOS: 3 days | Discharge: HOME HEALTH | End: 2023-05-08
Attending: ADVANCED PRACTICE MIDWIFE | Admitting: ADVANCED PRACTICE MIDWIFE
Payer: COMMERCIAL

## 2023-05-05 ENCOUNTER — ANESTHESIA (OUTPATIENT)
Dept: OBGYN | Facility: CLINIC | Age: 28
End: 2023-05-05
Payer: COMMERCIAL

## 2023-05-05 ENCOUNTER — ANESTHESIA EVENT (OUTPATIENT)
Dept: OBGYN | Facility: CLINIC | Age: 28
End: 2023-05-05
Payer: COMMERCIAL

## 2023-05-05 ENCOUNTER — NURSE TRIAGE (OUTPATIENT)
Dept: NURSING | Facility: CLINIC | Age: 28
End: 2023-05-05

## 2023-05-05 DIAGNOSIS — Z98.891 S/P CESAREAN SECTION: Primary | ICD-10-CM

## 2023-05-05 LAB
ABO/RH(D): ABNORMAL
ABO/RH(D): NORMAL
ANTIBODY ID: NORMAL
ANTIBODY SCREEN: POSITIVE
ERYTHROCYTE [DISTWIDTH] IN BLOOD BY AUTOMATED COUNT: 13.2 % (ref 10–15)
FETAL BLEED SCREEN: NORMAL
HCT VFR BLD AUTO: 39.1 % (ref 35–47)
HGB BLD-MCNC: 13.6 G/DL (ref 11.7–15.7)
MCH RBC QN AUTO: 30.1 PG (ref 26.5–33)
MCHC RBC AUTO-ENTMCNC: 34.8 G/DL (ref 31.5–36.5)
MCV RBC AUTO: 87 FL (ref 78–100)
PLATELET # BLD AUTO: 340 10E3/UL (ref 150–450)
RBC # BLD AUTO: 4.52 10E6/UL (ref 3.8–5.2)
SARS-COV-2 RNA RESP QL NAA+PROBE: NEGATIVE
SPECIMEN EXPIRATION DATE: ABNORMAL
SPECIMEN EXPIRATION DATE: NORMAL
SPECIMEN EXPIRATION DATE: NORMAL
WBC # BLD AUTO: 17.5 10E3/UL (ref 4–11)

## 2023-05-05 PROCEDURE — 999N000016 HC STATISTIC ATTENDANCE AT DELIVERY

## 2023-05-05 PROCEDURE — 00HU33Z INSERTION OF INFUSION DEVICE INTO SPINAL CANAL, PERCUTANEOUS APPROACH: ICD-10-PCS | Performed by: ANESTHESIOLOGY

## 2023-05-05 PROCEDURE — 86870 RBC ANTIBODY IDENTIFICATION: CPT | Performed by: ADVANCED PRACTICE MIDWIFE

## 2023-05-05 PROCEDURE — 258N000003 HC RX IP 258 OP 636: Performed by: STUDENT IN AN ORGANIZED HEALTH CARE EDUCATION/TRAINING PROGRAM

## 2023-05-05 PROCEDURE — 250N000011 HC RX IP 250 OP 636: Performed by: ADVANCED PRACTICE MIDWIFE

## 2023-05-05 PROCEDURE — 3E0R3BZ INTRODUCTION OF ANESTHETIC AGENT INTO SPINAL CANAL, PERCUTANEOUS APPROACH: ICD-10-PCS | Performed by: ANESTHESIOLOGY

## 2023-05-05 PROCEDURE — 85461 HEMOGLOBIN FETAL: CPT | Performed by: STUDENT IN AN ORGANIZED HEALTH CARE EDUCATION/TRAINING PROGRAM

## 2023-05-05 PROCEDURE — 250N000011 HC RX IP 250 OP 636: Performed by: OBSTETRICS & GYNECOLOGY

## 2023-05-05 PROCEDURE — 258N000003 HC RX IP 258 OP 636: Performed by: ADVANCED PRACTICE MIDWIFE

## 2023-05-05 PROCEDURE — 59515 CESAREAN DELIVERY: CPT | Mod: GC | Performed by: OBSTETRICS & GYNECOLOGY

## 2023-05-05 PROCEDURE — 272N000001 HC OR GENERAL SUPPLY STERILE: Performed by: OBSTETRICS & GYNECOLOGY

## 2023-05-05 PROCEDURE — 10907ZC DRAINAGE OF AMNIOTIC FLUID, THERAPEUTIC FROM PRODUCTS OF CONCEPTION, VIA NATURAL OR ARTIFICIAL OPENING: ICD-10-PCS | Performed by: ADVANCED PRACTICE MIDWIFE

## 2023-05-05 PROCEDURE — 36415 COLL VENOUS BLD VENIPUNCTURE: CPT | Performed by: STUDENT IN AN ORGANIZED HEALTH CARE EDUCATION/TRAINING PROGRAM

## 2023-05-05 PROCEDURE — 360N000076 HC SURGERY LEVEL 3, PER MIN: Performed by: OBSTETRICS & GYNECOLOGY

## 2023-05-05 PROCEDURE — 120N000002 HC R&B MED SURG/OB UMMC

## 2023-05-05 PROCEDURE — 250N000011 HC RX IP 250 OP 636: Performed by: STUDENT IN AN ORGANIZED HEALTH CARE EDUCATION/TRAINING PROGRAM

## 2023-05-05 PROCEDURE — 370N000017 HC ANESTHESIA TECHNICAL FEE, PER MIN: Performed by: OBSTETRICS & GYNECOLOGY

## 2023-05-05 PROCEDURE — 250N000009 HC RX 250: Performed by: STUDENT IN AN ORGANIZED HEALTH CARE EDUCATION/TRAINING PROGRAM

## 2023-05-05 PROCEDURE — U0005 INFEC AGEN DETEC AMPLI PROBE: HCPCS | Performed by: ADVANCED PRACTICE MIDWIFE

## 2023-05-05 PROCEDURE — 86780 TREPONEMA PALLIDUM: CPT | Performed by: ADVANCED PRACTICE MIDWIFE

## 2023-05-05 PROCEDURE — 85027 COMPLETE CBC AUTOMATED: CPT | Performed by: ADVANCED PRACTICE MIDWIFE

## 2023-05-05 PROCEDURE — 250N000013 HC RX MED GY IP 250 OP 250 PS 637: Performed by: OBSTETRICS & GYNECOLOGY

## 2023-05-05 PROCEDURE — 86850 RBC ANTIBODY SCREEN: CPT | Performed by: ADVANCED PRACTICE MIDWIFE

## 2023-05-05 PROCEDURE — C1765 ADHESION BARRIER: HCPCS | Performed by: OBSTETRICS & GYNECOLOGY

## 2023-05-05 PROCEDURE — 250N000013 HC RX MED GY IP 250 OP 250 PS 637: Performed by: STUDENT IN AN ORGANIZED HEALTH CARE EDUCATION/TRAINING PROGRAM

## 2023-05-05 PROCEDURE — C9290 INJ, BUPIVACAINE LIPOSOME: HCPCS | Performed by: STUDENT IN AN ORGANIZED HEALTH CARE EDUCATION/TRAINING PROGRAM

## 2023-05-05 PROCEDURE — 271N000001 HC OR GENERAL SUPPLY NON-STERILE: Performed by: OBSTETRICS & GYNECOLOGY

## 2023-05-05 PROCEDURE — G0463 HOSPITAL OUTPT CLINIC VISIT: HCPCS

## 2023-05-05 PROCEDURE — 710N000009 HC RECOVERY PHASE 1, LEVEL 1, PER MIN: Performed by: OBSTETRICS & GYNECOLOGY

## 2023-05-05 RX ORDER — ACETAMINOPHEN 325 MG/1
975 TABLET ORAL EVERY 6 HOURS
Status: DISCONTINUED | OUTPATIENT
Start: 2023-05-05 | End: 2023-05-08 | Stop reason: HOSPADM

## 2023-05-05 RX ORDER — OXYTOCIN/0.9 % SODIUM CHLORIDE 30/500 ML
340 PLASTIC BAG, INJECTION (ML) INTRAVENOUS CONTINUOUS PRN
Status: DISCONTINUED | OUTPATIENT
Start: 2023-05-05 | End: 2023-05-05 | Stop reason: HOSPADM

## 2023-05-05 RX ORDER — OXYTOCIN 10 [USP'U]/ML
10 INJECTION, SOLUTION INTRAMUSCULAR; INTRAVENOUS
Status: DISCONTINUED | OUTPATIENT
Start: 2023-05-05 | End: 2023-05-05 | Stop reason: HOSPADM

## 2023-05-05 RX ORDER — HYDRALAZINE HYDROCHLORIDE 20 MG/ML
2.5-5 INJECTION INTRAMUSCULAR; INTRAVENOUS EVERY 10 MIN PRN
Status: DISCONTINUED | OUTPATIENT
Start: 2023-05-05 | End: 2023-05-05 | Stop reason: HOSPADM

## 2023-05-05 RX ORDER — KETOROLAC TROMETHAMINE 30 MG/ML
30 INJECTION, SOLUTION INTRAMUSCULAR; INTRAVENOUS EVERY 6 HOURS
Status: COMPLETED | OUTPATIENT
Start: 2023-05-05 | End: 2023-05-06

## 2023-05-05 RX ORDER — METOCLOPRAMIDE 10 MG/1
10 TABLET ORAL EVERY 6 HOURS PRN
Status: DISCONTINUED | OUTPATIENT
Start: 2023-05-05 | End: 2023-05-08 | Stop reason: HOSPADM

## 2023-05-05 RX ORDER — DEXTROSE, SODIUM CHLORIDE, SODIUM LACTATE, POTASSIUM CHLORIDE, AND CALCIUM CHLORIDE 5; .6; .31; .03; .02 G/100ML; G/100ML; G/100ML; G/100ML; G/100ML
INJECTION, SOLUTION INTRAVENOUS CONTINUOUS
Status: DISCONTINUED | OUTPATIENT
Start: 2023-05-05 | End: 2023-05-08 | Stop reason: HOSPADM

## 2023-05-05 RX ORDER — TRANEXAMIC ACID 10 MG/ML
1 INJECTION, SOLUTION INTRAVENOUS EVERY 30 MIN PRN
Status: DISCONTINUED | OUTPATIENT
Start: 2023-05-05 | End: 2023-05-08 | Stop reason: HOSPADM

## 2023-05-05 RX ORDER — NALOXONE HYDROCHLORIDE 0.4 MG/ML
0.4 INJECTION, SOLUTION INTRAMUSCULAR; INTRAVENOUS; SUBCUTANEOUS
Status: DISCONTINUED | OUTPATIENT
Start: 2023-05-05 | End: 2023-05-05 | Stop reason: HOSPADM

## 2023-05-05 RX ORDER — LABETALOL HYDROCHLORIDE 5 MG/ML
10 INJECTION, SOLUTION INTRAVENOUS
Status: DISCONTINUED | OUTPATIENT
Start: 2023-05-05 | End: 2023-05-05 | Stop reason: HOSPADM

## 2023-05-05 RX ORDER — OXYTOCIN/0.9 % SODIUM CHLORIDE 30/500 ML
100-340 PLASTIC BAG, INJECTION (ML) INTRAVENOUS CONTINUOUS PRN
Status: DISCONTINUED | OUTPATIENT
Start: 2023-05-05 | End: 2023-05-05

## 2023-05-05 RX ORDER — METOCLOPRAMIDE HYDROCHLORIDE 5 MG/ML
10 INJECTION INTRAMUSCULAR; INTRAVENOUS EVERY 6 HOURS PRN
Status: DISCONTINUED | OUTPATIENT
Start: 2023-05-05 | End: 2023-05-05 | Stop reason: HOSPADM

## 2023-05-05 RX ORDER — OXYTOCIN/0.9 % SODIUM CHLORIDE 30/500 ML
PLASTIC BAG, INJECTION (ML) INTRAVENOUS CONTINUOUS PRN
Status: DISCONTINUED | OUTPATIENT
Start: 2023-05-05 | End: 2023-05-05

## 2023-05-05 RX ORDER — ONDANSETRON 4 MG/1
4 TABLET, ORALLY DISINTEGRATING ORAL EVERY 6 HOURS PRN
Status: DISCONTINUED | OUTPATIENT
Start: 2023-05-05 | End: 2023-05-05 | Stop reason: HOSPADM

## 2023-05-05 RX ORDER — MORPHINE SULFATE 1 MG/ML
INJECTION, SOLUTION EPIDURAL; INTRATHECAL; INTRAVENOUS PRN
Status: DISCONTINUED | OUTPATIENT
Start: 2023-05-05 | End: 2023-05-05

## 2023-05-05 RX ORDER — ONDANSETRON 2 MG/ML
4 INJECTION INTRAMUSCULAR; INTRAVENOUS EVERY 30 MIN PRN
Status: DISCONTINUED | OUTPATIENT
Start: 2023-05-05 | End: 2023-05-05 | Stop reason: HOSPADM

## 2023-05-05 RX ORDER — NALOXONE HYDROCHLORIDE 0.4 MG/ML
0.2 INJECTION, SOLUTION INTRAMUSCULAR; INTRAVENOUS; SUBCUTANEOUS
Status: DISCONTINUED | OUTPATIENT
Start: 2023-05-05 | End: 2023-05-05 | Stop reason: HOSPADM

## 2023-05-05 RX ORDER — ACETAMINOPHEN 325 MG/1
650 TABLET ORAL EVERY 4 HOURS PRN
Status: DISCONTINUED | OUTPATIENT
Start: 2023-05-05 | End: 2023-05-05 | Stop reason: HOSPADM

## 2023-05-05 RX ORDER — ONDANSETRON 4 MG/1
4 TABLET, ORALLY DISINTEGRATING ORAL EVERY 6 HOURS PRN
Status: DISCONTINUED | OUTPATIENT
Start: 2023-05-05 | End: 2023-05-08 | Stop reason: HOSPADM

## 2023-05-05 RX ORDER — PROCHLORPERAZINE 25 MG
25 SUPPOSITORY, RECTAL RECTAL EVERY 12 HOURS PRN
Status: DISCONTINUED | OUTPATIENT
Start: 2023-05-05 | End: 2023-05-08 | Stop reason: HOSPADM

## 2023-05-05 RX ORDER — KETOROLAC TROMETHAMINE 30 MG/ML
30 INJECTION, SOLUTION INTRAMUSCULAR; INTRAVENOUS
Status: DISCONTINUED | OUTPATIENT
Start: 2023-05-05 | End: 2023-05-05

## 2023-05-05 RX ORDER — OXYTOCIN/0.9 % SODIUM CHLORIDE 30/500 ML
340 PLASTIC BAG, INJECTION (ML) INTRAVENOUS CONTINUOUS PRN
Status: DISCONTINUED | OUTPATIENT
Start: 2023-05-05 | End: 2023-05-08 | Stop reason: HOSPADM

## 2023-05-05 RX ORDER — MISOPROSTOL 200 UG/1
800 TABLET ORAL
Status: DISCONTINUED | OUTPATIENT
Start: 2023-05-05 | End: 2023-05-05 | Stop reason: HOSPADM

## 2023-05-05 RX ORDER — LIDOCAINE 40 MG/G
CREAM TOPICAL
Status: DISCONTINUED | OUTPATIENT
Start: 2023-05-05 | End: 2023-05-05 | Stop reason: HOSPADM

## 2023-05-05 RX ORDER — CITRIC ACID/SODIUM CITRATE 334-500MG
30 SOLUTION, ORAL ORAL
Status: COMPLETED | OUTPATIENT
Start: 2023-05-05 | End: 2023-05-05

## 2023-05-05 RX ORDER — OXYTOCIN/0.9 % SODIUM CHLORIDE 30/500 ML
100-340 PLASTIC BAG, INJECTION (ML) INTRAVENOUS CONTINUOUS PRN
Status: DISCONTINUED | OUTPATIENT
Start: 2023-05-05 | End: 2023-05-08 | Stop reason: HOSPADM

## 2023-05-05 RX ORDER — NALOXONE HYDROCHLORIDE 0.4 MG/ML
0.4 INJECTION, SOLUTION INTRAMUSCULAR; INTRAVENOUS; SUBCUTANEOUS
Status: DISCONTINUED | OUTPATIENT
Start: 2023-05-05 | End: 2023-05-08 | Stop reason: HOSPADM

## 2023-05-05 RX ORDER — NALOXONE HYDROCHLORIDE 0.4 MG/ML
0.2 INJECTION, SOLUTION INTRAMUSCULAR; INTRAVENOUS; SUBCUTANEOUS
Status: DISCONTINUED | OUTPATIENT
Start: 2023-05-05 | End: 2023-05-08 | Stop reason: HOSPADM

## 2023-05-05 RX ORDER — CITRIC ACID/SODIUM CITRATE 334-500MG
30 SOLUTION, ORAL ORAL
Status: DISCONTINUED | OUTPATIENT
Start: 2023-05-05 | End: 2023-05-05 | Stop reason: HOSPADM

## 2023-05-05 RX ORDER — CARBOPROST TROMETHAMINE 250 UG/ML
250 INJECTION, SOLUTION INTRAMUSCULAR
Status: DISCONTINUED | OUTPATIENT
Start: 2023-05-05 | End: 2023-05-05 | Stop reason: HOSPADM

## 2023-05-05 RX ORDER — OXYTOCIN 10 [USP'U]/ML
10 INJECTION, SOLUTION INTRAMUSCULAR; INTRAVENOUS
Status: DISCONTINUED | OUTPATIENT
Start: 2023-05-05 | End: 2023-05-08 | Stop reason: HOSPADM

## 2023-05-05 RX ORDER — SODIUM CHLORIDE, SODIUM LACTATE, POTASSIUM CHLORIDE, CALCIUM CHLORIDE 600; 310; 30; 20 MG/100ML; MG/100ML; MG/100ML; MG/100ML
INJECTION, SOLUTION INTRAVENOUS CONTINUOUS
Status: DISCONTINUED | OUTPATIENT
Start: 2023-05-05 | End: 2023-05-05 | Stop reason: HOSPADM

## 2023-05-05 RX ORDER — ONDANSETRON 4 MG/1
4 TABLET, ORALLY DISINTEGRATING ORAL EVERY 30 MIN PRN
Status: DISCONTINUED | OUTPATIENT
Start: 2023-05-05 | End: 2023-05-05 | Stop reason: HOSPADM

## 2023-05-05 RX ORDER — MISOPROSTOL 200 UG/1
800 TABLET ORAL
Status: DISCONTINUED | OUTPATIENT
Start: 2023-05-05 | End: 2023-05-08 | Stop reason: HOSPADM

## 2023-05-05 RX ORDER — CEFAZOLIN SODIUM/WATER 2 G/20 ML
2 SYRINGE (ML) INTRAVENOUS
Status: COMPLETED | OUTPATIENT
Start: 2023-05-05 | End: 2023-05-05

## 2023-05-05 RX ORDER — IBUPROFEN 800 MG/1
800 TABLET, FILM COATED ORAL
Status: DISCONTINUED | OUTPATIENT
Start: 2023-05-05 | End: 2023-05-05

## 2023-05-05 RX ORDER — SODIUM CHLORIDE, SODIUM LACTATE, POTASSIUM CHLORIDE, CALCIUM CHLORIDE 600; 310; 30; 20 MG/100ML; MG/100ML; MG/100ML; MG/100ML
INJECTION, SOLUTION INTRAVENOUS CONTINUOUS PRN
Status: DISCONTINUED | OUTPATIENT
Start: 2023-05-05 | End: 2023-05-05

## 2023-05-05 RX ORDER — FENTANYL CITRATE-0.9 % NACL/PF 10 MCG/ML
100 PLASTIC BAG, INJECTION (ML) INTRAVENOUS EVERY 5 MIN PRN
Status: DISCONTINUED | OUTPATIENT
Start: 2023-05-05 | End: 2023-05-05 | Stop reason: HOSPADM

## 2023-05-05 RX ORDER — FENTANYL CITRATE 50 UG/ML
100 INJECTION, SOLUTION INTRAMUSCULAR; INTRAVENOUS
Status: DISCONTINUED | OUTPATIENT
Start: 2023-05-05 | End: 2023-05-05 | Stop reason: HOSPADM

## 2023-05-05 RX ORDER — CEFAZOLIN SODIUM/WATER 2 G/20 ML
2 SYRINGE (ML) INTRAVENOUS SEE ADMIN INSTRUCTIONS
Status: DISCONTINUED | OUTPATIENT
Start: 2023-05-05 | End: 2023-05-05 | Stop reason: HOSPADM

## 2023-05-05 RX ORDER — SIMETHICONE 80 MG
80 TABLET,CHEWABLE ORAL 4 TIMES DAILY PRN
Status: DISCONTINUED | OUTPATIENT
Start: 2023-05-05 | End: 2023-05-08 | Stop reason: HOSPADM

## 2023-05-05 RX ORDER — NALBUPHINE HYDROCHLORIDE 10 MG/ML
2.5-5 INJECTION, SOLUTION INTRAMUSCULAR; INTRAVENOUS; SUBCUTANEOUS EVERY 4 HOURS PRN
Status: DISCONTINUED | OUTPATIENT
Start: 2023-05-05 | End: 2023-05-08 | Stop reason: HOSPADM

## 2023-05-05 RX ORDER — SODIUM CHLORIDE, SODIUM LACTATE, POTASSIUM CHLORIDE, CALCIUM CHLORIDE 600; 310; 30; 20 MG/100ML; MG/100ML; MG/100ML; MG/100ML
INJECTION, SOLUTION INTRAVENOUS CONTINUOUS
Status: DISCONTINUED | OUTPATIENT
Start: 2023-05-05 | End: 2023-05-05

## 2023-05-05 RX ORDER — OXYCODONE HYDROCHLORIDE 5 MG/1
5 TABLET ORAL EVERY 4 HOURS PRN
Status: DISCONTINUED | OUTPATIENT
Start: 2023-05-05 | End: 2023-05-08 | Stop reason: HOSPADM

## 2023-05-05 RX ORDER — PROCHLORPERAZINE MALEATE 10 MG
10 TABLET ORAL EVERY 6 HOURS PRN
Status: DISCONTINUED | OUTPATIENT
Start: 2023-05-05 | End: 2023-05-08 | Stop reason: HOSPADM

## 2023-05-05 RX ORDER — FENTANYL/ROPIVACAINE/NS/PF 2MCG/ML-.1
PLASTIC BAG, INJECTION (ML) EPIDURAL
Status: DISCONTINUED | OUTPATIENT
Start: 2023-05-05 | End: 2023-05-05 | Stop reason: HOSPADM

## 2023-05-05 RX ORDER — LIDOCAINE HCL/EPINEPHRINE/PF 2%-1:200K
VIAL (ML) INJECTION PRN
Status: DISCONTINUED | OUTPATIENT
Start: 2023-05-05 | End: 2023-05-05

## 2023-05-05 RX ORDER — IBUPROFEN 800 MG/1
800 TABLET, FILM COATED ORAL EVERY 6 HOURS
Status: DISCONTINUED | OUTPATIENT
Start: 2023-05-06 | End: 2023-05-08 | Stop reason: HOSPADM

## 2023-05-05 RX ORDER — MISOPROSTOL 200 UG/1
400 TABLET ORAL
Status: DISCONTINUED | OUTPATIENT
Start: 2023-05-05 | End: 2023-05-05 | Stop reason: HOSPADM

## 2023-05-05 RX ORDER — HYDROCORTISONE 25 MG/G
CREAM TOPICAL 3 TIMES DAILY PRN
Status: DISCONTINUED | OUTPATIENT
Start: 2023-05-05 | End: 2023-05-08 | Stop reason: HOSPADM

## 2023-05-05 RX ORDER — AMOXICILLIN 250 MG
2 CAPSULE ORAL 2 TIMES DAILY
Status: DISCONTINUED | OUTPATIENT
Start: 2023-05-05 | End: 2023-05-08 | Stop reason: HOSPADM

## 2023-05-05 RX ORDER — AMOXICILLIN 250 MG
1 CAPSULE ORAL 2 TIMES DAILY
Status: DISCONTINUED | OUTPATIENT
Start: 2023-05-05 | End: 2023-05-08 | Stop reason: HOSPADM

## 2023-05-05 RX ORDER — OXYTOCIN 10 [USP'U]/ML
10 INJECTION, SOLUTION INTRAMUSCULAR; INTRAVENOUS
Status: DISCONTINUED | OUTPATIENT
Start: 2023-05-05 | End: 2023-05-05

## 2023-05-05 RX ORDER — AZITHROMYCIN 500 MG/5ML
500 INJECTION, POWDER, LYOPHILIZED, FOR SOLUTION INTRAVENOUS
Status: COMPLETED | OUTPATIENT
Start: 2023-05-05 | End: 2023-05-05

## 2023-05-05 RX ORDER — TRANEXAMIC ACID 10 MG/ML
1 INJECTION, SOLUTION INTRAVENOUS EVERY 30 MIN PRN
Status: DISCONTINUED | OUTPATIENT
Start: 2023-05-05 | End: 2023-05-05 | Stop reason: HOSPADM

## 2023-05-05 RX ORDER — ONDANSETRON 2 MG/ML
INJECTION INTRAMUSCULAR; INTRAVENOUS PRN
Status: DISCONTINUED | OUTPATIENT
Start: 2023-05-05 | End: 2023-05-05

## 2023-05-05 RX ORDER — METHYLERGONOVINE MALEATE 0.2 MG/ML
200 INJECTION INTRAVENOUS
Status: DISCONTINUED | OUTPATIENT
Start: 2023-05-05 | End: 2023-05-08 | Stop reason: HOSPADM

## 2023-05-05 RX ORDER — CARBOPROST TROMETHAMINE 250 UG/ML
250 INJECTION, SOLUTION INTRAMUSCULAR
Status: DISCONTINUED | OUTPATIENT
Start: 2023-05-05 | End: 2023-05-08 | Stop reason: HOSPADM

## 2023-05-05 RX ORDER — PROCHLORPERAZINE 25 MG
25 SUPPOSITORY, RECTAL RECTAL EVERY 12 HOURS PRN
Status: DISCONTINUED | OUTPATIENT
Start: 2023-05-05 | End: 2023-05-05 | Stop reason: HOSPADM

## 2023-05-05 RX ORDER — DIPHENHYDRAMINE HCL 25 MG
25 CAPSULE ORAL EVERY 6 HOURS PRN
Status: DISCONTINUED | OUTPATIENT
Start: 2023-05-05 | End: 2023-05-08 | Stop reason: HOSPADM

## 2023-05-05 RX ORDER — METOCLOPRAMIDE HYDROCHLORIDE 5 MG/ML
10 INJECTION INTRAMUSCULAR; INTRAVENOUS EVERY 6 HOURS PRN
Status: DISCONTINUED | OUTPATIENT
Start: 2023-05-05 | End: 2023-05-08 | Stop reason: HOSPADM

## 2023-05-05 RX ORDER — PROCHLORPERAZINE MALEATE 10 MG
10 TABLET ORAL EVERY 6 HOURS PRN
Status: DISCONTINUED | OUTPATIENT
Start: 2023-05-05 | End: 2023-05-05 | Stop reason: HOSPADM

## 2023-05-05 RX ORDER — BUPIVACAINE HYDROCHLORIDE 2.5 MG/ML
INJECTION, SOLUTION EPIDURAL; INFILTRATION; INTRACAUDAL
Status: COMPLETED | OUTPATIENT
Start: 2023-05-05 | End: 2023-05-05

## 2023-05-05 RX ORDER — ONDANSETRON 2 MG/ML
4 INJECTION INTRAMUSCULAR; INTRAVENOUS EVERY 6 HOURS PRN
Status: DISCONTINUED | OUTPATIENT
Start: 2023-05-05 | End: 2023-05-08 | Stop reason: HOSPADM

## 2023-05-05 RX ORDER — METOCLOPRAMIDE 10 MG/1
10 TABLET ORAL EVERY 6 HOURS PRN
Status: DISCONTINUED | OUTPATIENT
Start: 2023-05-05 | End: 2023-05-05 | Stop reason: HOSPADM

## 2023-05-05 RX ORDER — MISOPROSTOL 200 UG/1
400 TABLET ORAL
Status: DISCONTINUED | OUTPATIENT
Start: 2023-05-05 | End: 2023-05-08 | Stop reason: HOSPADM

## 2023-05-05 RX ORDER — METHYLERGONOVINE MALEATE 0.2 MG/ML
200 INJECTION INTRAVENOUS
Status: DISCONTINUED | OUTPATIENT
Start: 2023-05-05 | End: 2023-05-05 | Stop reason: HOSPADM

## 2023-05-05 RX ORDER — ONDANSETRON 2 MG/ML
4 INJECTION INTRAMUSCULAR; INTRAVENOUS EVERY 6 HOURS PRN
Status: DISCONTINUED | OUTPATIENT
Start: 2023-05-05 | End: 2023-05-05 | Stop reason: HOSPADM

## 2023-05-05 RX ORDER — LIDOCAINE 40 MG/G
CREAM TOPICAL
Status: DISCONTINUED | OUTPATIENT
Start: 2023-05-05 | End: 2023-05-08 | Stop reason: HOSPADM

## 2023-05-05 RX ORDER — MODIFIED LANOLIN
OINTMENT (GRAM) TOPICAL
Status: DISCONTINUED | OUTPATIENT
Start: 2023-05-05 | End: 2023-05-08 | Stop reason: HOSPADM

## 2023-05-05 RX ORDER — DIPHENHYDRAMINE HYDROCHLORIDE 50 MG/ML
25 INJECTION INTRAMUSCULAR; INTRAVENOUS EVERY 6 HOURS PRN
Status: DISCONTINUED | OUTPATIENT
Start: 2023-05-05 | End: 2023-05-08 | Stop reason: HOSPADM

## 2023-05-05 RX ORDER — ACETAMINOPHEN 325 MG/1
975 TABLET ORAL ONCE
Status: DISCONTINUED | OUTPATIENT
Start: 2023-05-05 | End: 2023-05-05 | Stop reason: HOSPADM

## 2023-05-05 RX ORDER — NALBUPHINE HYDROCHLORIDE 10 MG/ML
2.5-5 INJECTION, SOLUTION INTRAMUSCULAR; INTRAVENOUS; SUBCUTANEOUS EVERY 6 HOURS PRN
Status: DISCONTINUED | OUTPATIENT
Start: 2023-05-05 | End: 2023-05-05

## 2023-05-05 RX ORDER — BISACODYL 10 MG
10 SUPPOSITORY, RECTAL RECTAL DAILY PRN
Status: DISCONTINUED | OUTPATIENT
Start: 2023-05-07 | End: 2023-05-08 | Stop reason: HOSPADM

## 2023-05-05 RX ADMIN — SIMETHICONE 80 MG: 80 TABLET, CHEWABLE ORAL at 23:39

## 2023-05-05 RX ADMIN — Medication 2 G: at 16:53

## 2023-05-05 RX ADMIN — OXYTOCIN-SODIUM CHLORIDE 0.9% IV SOLN 30 UNIT/500ML 600 ML/HR: 30-0.9/5 SOLUTION at 17:02

## 2023-05-05 RX ADMIN — KETOROLAC TROMETHAMINE 30 MG: 30 INJECTION, SOLUTION INTRAMUSCULAR at 17:20

## 2023-05-05 RX ADMIN — NALBUPHINE HYDROCHLORIDE 5 MG: 10 INJECTION, SOLUTION INTRAMUSCULAR; INTRAVENOUS; SUBCUTANEOUS at 23:38

## 2023-05-05 RX ADMIN — BUPIVACAINE HYDROCHLORIDE 5 ML: 2.5 INJECTION, SOLUTION EPIDURAL; INFILTRATION; INTRACAUDAL at 12:15

## 2023-05-05 RX ADMIN — SODIUM CITRATE AND CITRIC ACID MONOHYDRATE 30 ML: 500; 334 SOLUTION ORAL at 16:45

## 2023-05-05 RX ADMIN — PHENYLEPHRINE HYDROCHLORIDE 200 MCG: 10 INJECTION INTRAVENOUS at 16:55

## 2023-05-05 RX ADMIN — PHENYLEPHRINE HYDROCHLORIDE 200 MCG: 10 INJECTION INTRAVENOUS at 17:04

## 2023-05-05 RX ADMIN — PHENYLEPHRINE HYDROCHLORIDE 200 MCG: 10 INJECTION INTRAVENOUS at 17:29

## 2023-05-05 RX ADMIN — SODIUM CHLORIDE, POTASSIUM CHLORIDE, SODIUM LACTATE AND CALCIUM CHLORIDE: 600; 310; 30; 20 INJECTION, SOLUTION INTRAVENOUS at 15:55

## 2023-05-05 RX ADMIN — PHENYLEPHRINE HYDROCHLORIDE 50 MCG/MIN: 10 INJECTION INTRAVENOUS at 16:51

## 2023-05-05 RX ADMIN — ACETAMINOPHEN 975 MG: 325 TABLET, FILM COATED ORAL at 18:32

## 2023-05-05 RX ADMIN — PHENYLEPHRINE HYDROCHLORIDE 200 MCG: 10 INJECTION INTRAVENOUS at 17:14

## 2023-05-05 RX ADMIN — SODIUM CHLORIDE, POTASSIUM CHLORIDE, SODIUM LACTATE AND CALCIUM CHLORIDE: 600; 310; 30; 20 INJECTION, SOLUTION INTRAVENOUS at 16:59

## 2023-05-05 RX ADMIN — SODIUM CHLORIDE, POTASSIUM CHLORIDE, SODIUM LACTATE AND CALCIUM CHLORIDE 1000 ML: 600; 310; 30; 20 INJECTION, SOLUTION INTRAVENOUS at 11:34

## 2023-05-05 RX ADMIN — MORPHINE SULFATE 2 MG: 1 INJECTION EPIDURAL; INTRATHECAL; INTRAVENOUS at 17:11

## 2023-05-05 RX ADMIN — LIDOCAINE HYDROCHLORIDE,EPINEPHRINE BITARTRATE 10 ML: 20; .005 INJECTION, SOLUTION EPIDURAL; INFILTRATION; INTRACAUDAL; PERINEURAL at 16:49

## 2023-05-05 RX ADMIN — DOCUSATE SODIUM AND SENNOSIDES 2 TABLET: 8.6; 5 TABLET, FILM COATED ORAL at 23:39

## 2023-05-05 RX ADMIN — KETOROLAC TROMETHAMINE 30 MG: 30 INJECTION, SOLUTION INTRAMUSCULAR; INTRAVENOUS at 23:38

## 2023-05-05 RX ADMIN — BUPIVACAINE 20 ML: 13.3 INJECTION, SUSPENSION, LIPOSOMAL INFILTRATION at 18:00

## 2023-05-05 RX ADMIN — Medication 100 MCG: at 12:44

## 2023-05-05 RX ADMIN — Medication 200 MCG: at 12:40

## 2023-05-05 RX ADMIN — PHENYLEPHRINE HYDROCHLORIDE 400 MCG: 10 INJECTION INTRAVENOUS at 16:51

## 2023-05-05 RX ADMIN — BUPIVACAINE HYDROCHLORIDE 20 ML: 2.5 INJECTION, SOLUTION EPIDURAL; INFILTRATION; INTRACAUDAL at 18:00

## 2023-05-05 RX ADMIN — Medication 500 MG: at 16:54

## 2023-05-05 RX ADMIN — ONDANSETRON 4 MG: 2 INJECTION INTRAMUSCULAR; INTRAVENOUS at 16:51

## 2023-05-05 RX ADMIN — Medication: at 14:07

## 2023-05-05 RX ADMIN — SODIUM CHLORIDE, POTASSIUM CHLORIDE, SODIUM LACTATE AND CALCIUM CHLORIDE 1000 ML: 600; 310; 30; 20 INJECTION, SOLUTION INTRAVENOUS at 12:30

## 2023-05-05 RX ADMIN — PHENYLEPHRINE HYDROCHLORIDE 200 MCG: 10 INJECTION INTRAVENOUS at 17:07

## 2023-05-05 ASSESSMENT — ACTIVITIES OF DAILY LIVING (ADL)
ADLS_ACUITY_SCORE: 18
ADLS_ACUITY_SCORE: 31
ADLS_ACUITY_SCORE: 22
ADLS_ACUITY_SCORE: 22
ADLS_ACUITY_SCORE: 18
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
## 2023-05-05 NOTE — OP NOTE
[facility]  Operative Note     Surgery Date:  2023  Surgeon:  Lora Jordan MD  Assistants:  Kam Hays MD PGY2    Pre-op Diagnosis:    - Intrauterine pregnancy at 39w6d  - Category II  - Rh Negative       Post-op Diagnosis:    - Same   - Liveborn male infant     Procedure:    - STAT Primary low-transverse  section with single layer uterine closure via Pfannenstial incision    Anesthesia:  Spinal  QBL:    322 mL  IVF:    500 mL crystalloid  UOP:    300 mL clear urine at the end of the case  Drains:   Aleman Catheter   Specimens:   Routine cord blood/segment, placenta  Antibiotics:  2g Ancef, 500mg Azithromycin  Additional medications: None  Complications:  None    Indications:   Regina Mays is a 28 year old  at 39w6d admitted for spontaneous labor. Regina progressed to 9cm but had a category II tracing for over an hour. A category II huddle was called and a primary  section was recommended. The risks, benefits, and alternatives of  section were discussed with the patient, and she agreed to proceed. The patient was consented for a blood transfusion. In the OR the fetal heart tones were noted to be bradycardic and the  section proceeded in STAT fashion.    Findings:   1. No intraabdominal adhesions, No adhesions between bladder and lower uterine segment  2. Meconium stained amniotic fluid  3. Liveborn male infant in OT presentation. Born at 1700 on 23. Apgars 7 at 1 minute & 9 at 5 minutes. Weight 3.09 kg.  4. Normal uterus, fallopian tubes, and ovaries.   5. Cord gasses below     Latest Reference Range & Units 23 17:09   Ph Cord Arterial 7.16 - 7.39  7.20   PCO2 Cord Arterial 35 - 71 mm Hg 58   PO2 Cord Arterial 3 - 33 mm Hg <10   Bicarbonate Cord Arterial 16 - 24 mmol/L 23   Base Excess Cord Arterial -9.6 - 2.0 mmol/L -6.3   Ph Cord Blood Venous 7.21 - 7.45  7.24   PCO2 Cord Venous 27 - 57 mm Hg 52   PO2 Cord Venous 21 - 37 mm  Hg 12 (L)   Bicarbonate Cord Venous 16 - 24 mmol/L 23   Base Excess/Deficit (+/-) -8.1 - 1.9 mmol/L -5.5     Procedure Details:   The patient was brought to the OR, where epidural anesthesia was bolused for surgery. She was placed in the dorsal supine position with a slight leftward tilt. Fetal heart tones were noted to be in the 70s without recovery and the decision was made to proceed in STAT fashion. A betadine splash was performed and a sterile drape was placed. A brief surgical time out was performed. A pfannenstiel skin incision was made with the scalpel, and carried down to the underlying fascia where the fascia was incised in the midline, and the incision was extended in a cephalad and caudad fashion. The rectus muscles were  in the midline, and the peritoneum was entered bluntly, and the opening was extended with digital pressure. The bladder blade was placed. A transverse hysterotomy was made with the scalpel in the lower uterine segment, and the incision was extended with digital pressure. The infant was noted to be in the OT position, and was delivered atraumatically. The shoulders delivered easily. No nuchal cord was noted. The cord was doubly clamped and cut immediately and the infant was handed off to the awaiting nursery staff. A segment of cord was cut and sent to lab. Cord gasses were collected. The placenta was delivered with gentle traction on the umbilical cord and uterine massage. The uterus was cleared of all clots and debris. Uterine tone was noted to be improving with 30 units of pitocin given through the running IV and uterine massage. The hysterotomy was closed with a running locked suture of 0 Vicryl. A figure of X was applied to the left lateral hysterotomy with 0 Monocryl suture. The hysterotomy was noted to be hemostatic. The pericolic gutters were cleared of all clots and debris. The hysterotomy was reexamined and noted to be hemostatic. The fascia and rectus muscles were  examined and areas of oozing were controlled with electrocautery. The fascia was closed with a running 0 Vicryl suture. The subcutaneous tissue was irrigated and areas of oozing were controlled with electrocautery. The subcutaneous tissue was less than 2cm in thickness, and was therefore not closed. The skin was closed with a running subcuticular 4-0 Monocryl suture. Benzoin was applied above and below the incision, steristrips were applied followed by a sterile dressing.    All sponge, needle, and instrument counts were correct. The patient tolerated the procedure well, and was transferred to recovery in stable condition. Dr. Jordan was present and scrubbed for the procedure.     Kam Hays MD, MPH  Ob/Gyn Resident, PGY-2  05/05/23 6:59 PM    -----------  ATTESTATION:   I was scrubbed and present for the entire procedure. I agree with the above note which I have edited to reflect my findings.     Lora Jordan MD

## 2023-05-05 NOTE — PLAN OF CARE
Data: Pt to OB PACU at 1800 via cart. PIV infusing without complications, miguel with blood tinged urine to gravity, pt denies any pain, denies any nausea and vomiting.  Interventions: IV to pump, monitors and alarms on, SCD on.  Response: stable.  Plan: Patient instructed to notify RN for pain or nausea, routine post op cares, initiate breastfeeding/pumping as soon as patient/infant able.     Plan of Care Reviewed With: patient

## 2023-05-05 NOTE — PLAN OF CARE
[facility] with contractions that started at 2300 last night, that became more intense around 0100. Denies LOF, reports some bloody show and active fetal movement. Here with her  Carloz and zulma Ennis. EFM applied and admission assessment completed. Mylene PINA CNM notified of patient arrival, SVE per provider 5/70/-1. Plan to admit to inpatient for labor. PIV started, covid swab collected. Pt educated on options for pain medications and labor process. Transitioned to intermittent auscultation at 0930.

## 2023-05-05 NOTE — PROGRESS NOTES
S: I huddled with RN after persistent category II fetal heart rate tracing for 45 minutes.  Strip reviewed at bedside and on the unit.    O: Baseline rate 150, normal  Variability moderate with periods of minimal  Accelerations not present  Decelerations present, deceleration type: late , deceleration frequency: recurrent (occurring 50% or more).   Are the decelerations significant?  Yes    Assessment: EFM interpretation suggests absence of concern for fetal metabolic acidemia at this time due to variability: moderate    Uterine Activity normal/regular.     The interventions currently taken to improve the fetal heart rate tracing and fetal oxygenation are: maternal positioning    A: Persistent category II tracing.    P: Per the category II algorithm, the plan is to continue observe/conservative management. Reevaluate in 30 minutes.       JORGE Castillo CNM

## 2023-05-05 NOTE — TELEPHONE ENCOUNTER
"OB Triage Call      Is patient's OB/Midwife with the [facility] or [facility]? [facility]- Proceed with triage     Reason for call: Contractions, possible bloody show    Assessment: Patient reports having onset of contractions at 10pm with more consistent contractions every 10 minutes for the past hour. Reporting pain rated mild. Patient also reports blood tinged discharge. Denies fever, leakage of fluid and baby moving less.    Plan: Home care    Patient plans to deliver at [facility]    Patient's primary OB Provider is [facility].      Per protocol recommendations Patient to follow home care recommendations.       Is patient's delivering hospital on divert? Does not apply due to Patient given home care instructions      39w6d    Estimated Date of Delivery: May 6, 2023        OB History    Para Term  AB Living   2 0 0 0 1 0   SAB IAB Ectopic Multiple Live Births   0 0 0 0 0      # Outcome Date GA Lbr Alberto/2nd Weight Sex Delivery Anes PTL Lv   2 Current            1 AB 22 9w3d              No results found for: GBS       Zoya Penn RN 23 2:09 AM  [facility] Nurse Advisor    Reason for Disposition    [1] First baby (primipara) AND [2] contractions > 5 minutes apart OR for < 2 hours    Additional Information    Negative: Difficult to awaken or acting confused (e.g., disoriented, slurred speech)    Negative: Shock suspected (e.g., cold/pale/clammy skin, too weak to stand, low BP, rapid pulse)    Negative: Passed out (i.e., lost consciousness, collapsed and was not responding)    Negative: [1] SEVERE abdominal pain (e.g., excruciating) AND [2] constant AND [3] present > 1 hour    Negative: Severe bleeding (e.g., continuous red blood from vagina, or large blood clots)    Negative: Umbilical cord hanging out of the vagina (shiny, white, curled appearance, \"like telephone cord\")    Negative: Uncontrollable urge to push (i.e., feels like baby is coming out now)    " Negative: Can see baby    Negative: Sounds like a life-threatening emergency to the triager    Negative: Pregnant < 37 weeks (i.e., )    Negative: [1] Uncertain delivery date AND [2] possibly pregnant < 37 weeks (i.e., )    Negative: [1] First baby (primipara) AND [2] contractions < 6 minutes apart  AND [3] present 2 hours    Negative: [1] History of prior delivery (multipara) AND [2] contractions < 10 minutes apart AND [3] present 1 hour    Negative: [1] History of rapid prior delivery AND [2] contractions < 10 minutes apart    Negative: [1] Leakage of fluid from vagina AND [2] green or brown in color    Negative: [1] Leakage of fluid from vagina AND [2] leakage started > 4 hours ago    Negative: Vaginal bleeding or spotting  (Exception: Brief spotting after intercourse or pelvic exam.)    Negative: Baby moving less today (e.g., kick count < 5 in 1 hour or < 10 in 2 hours)    Negative: Severe headache or headache that won't go away    Negative: New blurred vision or vision changes    Negative: MODERATE-SEVERE abdominal pain    Negative: [1] MILD abdominal pain (e.g., doesn't interfere with normal activities) AND [2] constant AND [3] present > 2 hours    Negative: Fever > 100.4 F (38.0 C)    Negative: [1] Leakage of fluid from vagina AND [2] leakage started < 4 hours ago    Negative: Patient sounds very sick or weak to the triager    Negative: [1] First baby (primipara) AND [2] contractions < 10 minutes apart AND [3] present 4 hours or longer    Protocols used: PREGNANCY - LABOR-A-AH

## 2023-05-05 NOTE — ANESTHESIA PROCEDURE NOTES
"Epidural catheter Procedure Note    Pre-Procedure   Staff -        Anesthesiologist:  Karen Flores MD       Resident/Fellow: Bo Kelley MD       Performed By: resident       Location: OB       Procedure Start/Stop Times: 5/5/2023 12:15 PM and 5/5/2023 12:28 PM       Pre-Anesthestic Checklist: patient identified, IV checked, risks and benefits discussed, informed consent, monitors and equipment checked, pre-op evaluation, at physician/surgeon's request and post-op pain management  Timeout:       Correct Patient: Yes        Correct Procedure: Yes        Correct Site: Yes        Correct Position: Yes   Procedure Documentation  Procedure: epidural catheter       Diagnosis: analgesia       Patient Position: sitting       Skin prep: Chloraprep       Local skin infiltrated with 3 mL of 1% lidocaine.        Insertion Site: L3-4. (midline approach).       Technique: LORT saline        AMANDA at 6 cm.       Needle Type: Blommingy needle       Needle Gauge: 17.        Needle Length (Inches): 3.5        Catheter: 19 G.          Catheter threaded easily.         5 cm epidural space.         Threaded 11 cm at skin.         # of attempts: 1 and  # of redirects:  0    Assessment/Narrative         Paresthesias: No.       Test dose of 3 mL lidocaine 1.5% w/ 1:200,000 epinephrine at 12:18 CDT.         Test dose negative, 3 minutes after injection, for signs of intravascular, subdural, or intrathecal injection.       Insertion/Infusion Method: LORT saline       No aspiration negative for Heme or CSF via Epidural Catheter.    Medication(s) Administered   0.125% bupivacaine 5 mL + fentanyl 20 mcg + NS 5 mL (Epidural) (Mixture components: bupivacaine HCl (PF) 0.25 % Soln, 5 mL; fentaNYL (PF) 100 MCG/2ML Soln, 20 mcg; sodium chloride 0.9 % Soln, 5 mL) - EPIDURAL   5 mL - 5/5/2023 12:15:00 PM  Medication Administration Time: 5/5/2023 12:15 PM     Comments:  Patient states that they experienced a \"weird smell\" about 1 minute after " "test dose injection. Vitals remained stable without increased HR or BP, no motor block and no other symptoms such as tinnitus, perioral numbness or metallic taste in mouth      FOR [facility] ([facility] [place]) ONLY:   Pain Team Contact information: please page the Pain Team Via [employer]. Search \"Pain\". During daytime hours, please page the attending first. At night please page the resident first.      "

## 2023-05-05 NOTE — PLAN OF CARE
"Patient reporting increased pain and discomfort.  Requesting an epidural for pain relief. \ MDA called and in room at 1200. Patient and procedure correctly identified/ verified with MDA. Consent signed. 1000 mL fluid bolus given prior to epidural placement.  Epidural placed by MDA without complication. Test dose/ bolus given by MDA, patient tolerated well. Pt reported a \"strange\" taste in her mouth after test dose given, all other VSS. Hypotension at 1239, followed by prolonged deceleration in FHR. 3 doses phenylephrine given, fluid bolus started, and anesthesia and CNM called to bedside. Deceleration resolved with hands and knees positioning and phenylephrine. Anesthesia at bedside at 1325 for another test dose and to connect patient to epidural pump, no symptoms felt with second test dose. Patient reports pain relief after procedure.                         "

## 2023-05-05 NOTE — PROGRESS NOTES
"Paged to room urgently by RN for low BP and FHT decel at 1241. CNM to room immediately and FHT returned to baseline. OBGYN resident Dr. Hays also at bedside, as he was watching FHT from unit at time of decel. Patient treated with phenylephrine x 3 for hypotension following epidural placement. In hands and knees position in bed. FHT baseline 140; variability: moderate; accels: present; decels: decel x 90 second at 1228, prolonged decel x 2 min at 1241; intermittent decels that are either late or early in nature (unable to decipher as toco not tracing ctx well during this time)    S:  Regina is feeling relief with epidural placement. Hoping to get some rest. [place] gush of fluid at 1310, but upon examination it appears to be bloody show.    O:  /58   Temp 97.6  F (36.4  C) (Oral)   Resp 20   Ht 1.626 m (5' 4\")   Wt 83.5 kg (184 lb)   LMP 2022   SpO2 100%   BMI 31.58 kg/m    FHT: baseline: 140; variability: moderate; accels: yes; decels: as described above, since those described, no additional decels noted; ctx: q 2-3 min  SVE 8/90/-1 BBOW    Labor Course:  23  0920 admit in labor, SVE 5/75/-1  1228 epidural placement  1250 SVE 8/90/-1 BBOW    A:   at 39w6d, here for spontaneous labor  Labor status: active labor  GBS neg  Fetus Cat II   COVID-19 neg    P:  Rest with epidural  Reassess SVE when feeling more pressure in pelvis  Anticipate     Lina Duarte, APRN CNM      "

## 2023-05-05 NOTE — PROGRESS NOTES
"S:  Regina is laboring with support from her  Carloz and zulma Ennis. She is getting visibly more uncomfortable and coping well. Feeling pain of contractions in low back and low pelvis.    O:  /62   Temp 97.6  F (36.4  C) (Oral)   Resp 16   Ht 1.626 m (5' 4\")   Wt 83.5 kg (184 lb)   LMP 2022   BMI 31.58 kg/m    Intermittent auscultation of fetal heart rate: baseline: 140; increases not heard, decreases not heard    Labor Course:  920 admit in labor, SVE /-1    A:   at 39w6d, here for spontaneous labor  Labor status: active labor  GBS neg  Fetus Cat I via IA  COVID-19 neg    P:  Labor support with /  Continue IA monitoring  Reassess in 2 hours or as needed  Anticipate     Lina Duarte, JORGE VAIL      "
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
## 2023-05-05 NOTE — PROGRESS NOTES
S: I was notified by CNM of persistent category II fetal heart rate tracing for 60min minutes.  Strip reviewed on unit.  I came to the bedside to review with team.     O: Baseline rate 150, normal  Variability moderate  Accelerations present  Decelerations present, deceleration type: late , deceleration frequency: recurrent (occurring 50% or more).   Are the decelerations significant?   No      Assessment: EFM interpretation suggests concern for fetal metabolic acidemia at this time due to decelerations present late , deceleration frequency: recurrent (occurring 50% or more).       Uterine Activity see flowsheet.     The interventions currently taken to improve the fetal heart rate tracing and fetal oxygenation are: maternal positioning, IV fluid bolus , increase frequency of assessment, consult Category 2 algorithm, evaluate labor progress, sterile vaginal exam, blood pressure check and emotional support    A: Persistent category II tracing.    P: Per the category II algorithm, the proceed to  section or operative vaginal birth .     Patient in agreement with plan to move forward with .    Discussed risks of , including but not limited to infection, blood loss requiring transfusion, injury to other structures in operating field, such as the bowels, bladder, ureters, blood vessels, nerves and the baby; delayed recognition of injury that could requiring readmission and further procedures.    Anesthesia, scrub tech notified.    Lora Jordan MD

## 2023-05-05 NOTE — ANESTHESIA PROCEDURE NOTES
TAP Procedure Note    Pre-Procedure   Staff -        Anesthesiologist:  Jose M Saavedra MD       Resident/Fellow: Julio An MD       Performed By: resident       Location: OR       Pre-Anesthestic Checklist: patient identified, IV checked, site marked, risks and benefits discussed, informed consent, monitors and equipment checked, pre-op evaluation, at physician/surgeon's request and post-op pain management  Timeout:       Correct Patient: Yes        Correct Procedure: Yes        Correct Site: Yes        Correct Position: Yes        Correct Laterality: Yes        Site Marked: Yes  Procedure Documentation  Procedure: TAP       Diagnosis: POST-OPERATIVE ANALGESIA       Laterality: bilateral       Patient Position: sitting       Patient Prep/Sterile Barriers: sterile gloves, mask       Skin prep: Chloraprep       Needle Type: short bevel       Needle Gauge: 21.        Needle Length (millimeters): 110                 Ultrasound guided       1. Ultrasound was used to identify targeted nerve, plexus, vascular marker, or fascial plane and place a needle adjacent to it in real-time.       2. Ultrasound was used to visualize the spread of anesthetic in close proximity to the above referenced structure.       3. A permanent image is entered into the patient's record.       4. The visualized anatomic structures appeared normal.       5. There were no apparent abnormal pathologic findings.    Assessment/Narrative         The placement was negative for: blood aspirated, painful injection and site bleeding       Paresthesias: No.       Bolus given via needle..        Secured via.        Insertion/Infusion Method: Single Shot       Complications: none    Medication(s) Administered   Bupivacaine 0.25% PF (Infiltration) - Infiltration   20 mL - 5/5/2023 6:00:00 PM  Bupivacaine liposome (Exparel) 1.3% LA inj susp (Infiltration) - Infiltration   20 mL - 5/5/2023 6:00:00 PM    FOR [facility] ([facility]) ONLY:   Pain Team  "Contact information: please page the Pain Team Via [facility]. Search \"Pain\". During daytime hours, please page the attending first. At night please page the resident first.      "

## 2023-05-05 NOTE — ANESTHESIA CARE TRANSFER NOTE
Patient: Regina Mays    Procedure: Procedure(s):   section       Diagnosis: * No pre-op diagnosis entered *  Diagnosis Additional Information: No value filed.    Anesthesia Type:   Epidural     Note:    Oropharynx: oropharynx clear of all foreign objects  Level of Consciousness: awake  Oxygen Supplementation: room air    Independent Airway: airway patency satisfactory and stable  Dentition: dentition unchanged  Vital Signs Stable: post-procedure vital signs reviewed and stable  Report to RN Given: handoff report given  Patient transferred to: PACU  Comments: VSS. Breathing spontaneously at a regular rate with adequate tidal volumes and maintaining O2 sats. Denies nausea or pain. No apparent complications from anesthesia.     Julio An MD  Anesthesia CA-2    Handoff Report: Identifed the Patient, Identified the Reponsible Provider, Reviewed the pertinent medical history, Discussed the surgical course, Reviewed Intra-OP anesthesia mangement and issues during anesthesia, Set expectations for post-procedure period and Allowed opportunity for questions and acknowledgement of understanding      Vitals:  Vitals Value Taken Time   BP     Temp     Pulse 92 23 1811   Resp     SpO2 99 % 23 1811   Vitals shown include unvalidated device data.    Electronically Signed By: Julio An MD  May 5, 2023  6:12 PM

## 2023-05-05 NOTE — PROGRESS NOTES
"S:  Regina is rotating between different positions in bed due to Cat II FHT.     O:  /69   Temp 99.4  F (37.4  C) (Oral)   Resp 16   Ht 1.626 m (5' 4\")   Wt 83.5 kg (184 lb)   LMP 2022   SpO2 100%   BMI 31.58 kg/m    FHT: baseline: 155; variability: moderate; accels: none; decels: recurrent late; ctx: q 2-3min    Labor Course:  23  0920 admit in labor, SVE 5/75/-1  1228 epidural placement  1250 SVE 8/90/-1 BBOW  1345 Cat II FHT x 45 min  1435 SVE 9/100/-1, FHT Cat I  1509 AROM clear fluid  1512 FHT Cat II with recurrent late, moderate variability    A:   at 39w6d, here for spontaneous labor  Labor status: active labor  GBS neg  Fetus Cat II; normal baseline, recurrent decels x 30 min, normal labor progress; per Cat II algorithm: observe x 30min  COVID-19 neg    P:  Discussed Cat II FHT with patient, plan to observe x 30 min then reevaluate, position changes in meantime  Plan for consult with OBGYN if Cat II FHT does not resolve in next 30 min    JORGE Castillo CNM     "

## 2023-05-05 NOTE — PROGRESS NOTES
"S:  Regina is supported by  Carloz and zulma Ennis at bedside. Agrees to CS.    O:  /69   Temp 99.4  F (37.4  C) (Oral)   Resp 16   Ht 1.626 m (5' 4\")   Wt 83.5 kg (184 lb)   LMP 2022   SpO2 100%   BMI 31.58 kg/m    FHT: baseline: 150; variability: moderate; accels: none; decels: recurrent late; ctx: q 2-3  SVE unchanged    Labor Course:  23  0920 admit in labor, /-1  1228 epidural placement  1250 SVE 8/90/-1 BBOW  1345 Cat II FHT x 45 min  1435 SVE 9/100/-1, FHT Cat I  1509 AROM clear fluid  1512 FHT Cat II with recurrent late, moderate variability  1610 SVE unchanged, Cat II x 1 hour    A:   at 39w6d, here for spontaneous labor  Labor status: active labor  GBS neg  Fetus Cat II; normal baseline, recurrent decels x 1 hour, normal labor progress  COVID-19 neg    P:  Paged JUNIORGYSILVANA colleague Dr. Jordan to come to unit for assessment, recommend CS due to persistent Cat II FHT remote from delivery    JORGE Castillo CNM        "

## 2023-05-06 LAB
HGB BLD-MCNC: 9.9 G/DL (ref 11.7–15.7)
T PALLIDUM AB SER QL: NONREACTIVE

## 2023-05-06 PROCEDURE — 250N000011 HC RX IP 250 OP 636: Performed by: STUDENT IN AN ORGANIZED HEALTH CARE EDUCATION/TRAINING PROGRAM

## 2023-05-06 PROCEDURE — 250N000013 HC RX MED GY IP 250 OP 250 PS 637: Performed by: STUDENT IN AN ORGANIZED HEALTH CARE EDUCATION/TRAINING PROGRAM

## 2023-05-06 PROCEDURE — 36415 COLL VENOUS BLD VENIPUNCTURE: CPT | Performed by: STUDENT IN AN ORGANIZED HEALTH CARE EDUCATION/TRAINING PROGRAM

## 2023-05-06 PROCEDURE — 120N000002 HC R&B MED SURG/OB UMMC

## 2023-05-06 PROCEDURE — 85018 HEMOGLOBIN: CPT | Performed by: STUDENT IN AN ORGANIZED HEALTH CARE EDUCATION/TRAINING PROGRAM

## 2023-05-06 RX ADMIN — OXYCODONE HYDROCHLORIDE 5 MG: 5 TABLET ORAL at 22:37

## 2023-05-06 RX ADMIN — KETOROLAC TROMETHAMINE 30 MG: 30 INJECTION, SOLUTION INTRAMUSCULAR; INTRAVENOUS at 06:13

## 2023-05-06 RX ADMIN — DOCUSATE SODIUM AND SENNOSIDES 2 TABLET: 8.6; 5 TABLET, FILM COATED ORAL at 11:51

## 2023-05-06 RX ADMIN — DOCUSATE SODIUM AND SENNOSIDES 2 TABLET: 8.6; 5 TABLET, FILM COATED ORAL at 20:55

## 2023-05-06 RX ADMIN — IBUPROFEN 800 MG: 800 TABLET ORAL at 17:35

## 2023-05-06 RX ADMIN — KETOROLAC TROMETHAMINE 30 MG: 30 INJECTION, SOLUTION INTRAMUSCULAR; INTRAVENOUS at 11:52

## 2023-05-06 RX ADMIN — NALBUPHINE HYDROCHLORIDE 5 MG: 10 INJECTION, SOLUTION INTRAMUSCULAR; INTRAVENOUS; SUBCUTANEOUS at 06:13

## 2023-05-06 RX ADMIN — OXYCODONE HYDROCHLORIDE 5 MG: 5 TABLET ORAL at 17:35

## 2023-05-06 RX ADMIN — ACETAMINOPHEN 975 MG: 325 TABLET, FILM COATED ORAL at 00:38

## 2023-05-06 RX ADMIN — ACETAMINOPHEN 975 MG: 325 TABLET, FILM COATED ORAL at 06:13

## 2023-05-06 RX ADMIN — ACETAMINOPHEN 975 MG: 325 TABLET, FILM COATED ORAL at 11:52

## 2023-05-06 RX ADMIN — ACETAMINOPHEN 975 MG: 325 TABLET, FILM COATED ORAL at 17:35

## 2023-05-06 ASSESSMENT — ACTIVITIES OF DAILY LIVING (ADL)
ADLS_ACUITY_SCORE: 22

## 2023-05-06 NOTE — CARE PLAN
Data: Regina Mays transferred to [facility] room 7133 via zoomcart [id number]. Baby transferred via moms arms.  Action: Receiving unit notified of transfer: Yes. Patient and family notified of room change. Received report from Tosin WALTERS at 1930. Belongings sent to receiving unit. Accompanied by Registered Nurse. Oriented patient to surroundings. Call light within reach. ID bands double-checked with receiving RN.  Response: Patient tolerated transfer and is stable.

## 2023-05-06 NOTE — PLAN OF CARE
Data: Regina Mays transferred to [facility] via bed at 1945. Baby transferred via parent's arms.  Action: Receiving unit notified of transfer: Yes. Patient and family notified of room change. Report given to Chantelle at 1955. Belongings sent to receiving unit. Accompanied by Registered Nurse. Oriented patient to surroundings. Call light within reach. ID bands double-checked with receiving RN.  Response: Patient tolerated transfer and is stable.

## 2023-05-06 NOTE — PLAN OF CARE
Goal Outcome Evaluation:       VSS. Postpartum assessment WDL. Patient ambulating independently. Patient needs minimal assist with cares. Patient is breast feeding baby and needs assistance. Eating and drinking without nausea. Pain is managed with tylenol and ibuprofen. Patient complained of being itchy, Nubain was requested and ordered. Positive attachment behavior observed between mom and baby. Support person () is at the bedside and supportive of mom and baby. Continue with POC.

## 2023-05-06 NOTE — PROGRESS NOTES
"[facility]    Obstetrics Post-Op / Progress Note    Assessment & Plan   Assessment:  -1 Day Post-Op  Procedure(s):   section    Doing well.  No immediate surgical complications identified.  Pain well-controlled.  Breastfeeding going well    Plan:  Ambulation encouraged  Hemoglobin in AM  Anticipate discharge in 2 days as delivery late in day. May consider discharge tomorrow if desired and doing well    Chantelle Lugo MD     Interval History   Doing well.  Pain is well-controlled.  No fevers.  No history of wound drainage, warmth or significant erythema.  Good appetite.  Denies chest pain, shortness of breath, nausea or vomiting.  Ambulatory.  Breastfeeding well.    Medications     bupivacaine liposome (EXPAREL) LA inj was given in the infiltration site to produce post-op analgesia. Duration of action is up to 72 hours. Other \"justina\" meds should not be given for 96 hours except for lidocaine 4% patch. This is for INFORMATION ONLY.       dextrose 5% lactated ringers       - MEDICATION INSTRUCTIONS -       - MEDICATION INSTRUCTIONS -       oxytocin in 0.9% NaCl       oxytocin in 0.9% NaCl         acetaminophen  975 mg Oral Q6H     ibuprofen  800 mg Oral Q6H     ketorolac  30 mg Intravenous Q6H     rho(D) immune globulin  300 mcg Intravenous Once    Or     rho(D) immune globulin  300 mcg Intramuscular Once     senna-docusate  1 tablet Oral BID    Or     senna-docusate  2 tablet Oral BID     sodium chloride (PF)  3 mL Intracatheter Q8H       Physical Exam   Temp: 99.2  F (37.3  C) Temp src: Oral BP: 109/58 Pulse: 85   Resp: 16 SpO2: 99 % O2 Device: None (Room air)    Vitals:    23 0908   Weight: 83.5 kg (184 lb)     Vital Signs with Ranges  Temp:  [97.6  F (36.4  C)-100  F (37.8  C)] 99.2  F (37.3  C)  Pulse:  [] 85  Resp:  [16-20] 16  BP: ()/(39-83) 109/58  SpO2:  [97 %-100 %] 99 %  I/O last 3 completed shifts:  In: 500 [I.V.:500]  Out: " 1172 [Urine:850; Blood:322]    Uterine fundus is firm, non-tender and at the level of the umbilicus  Incision with bandage    Data   Recent Labs   Lab Test 05/05/23  0940   AS Positive*     Recent Labs   Lab Test 05/05/23  0940 04/11/23  0843   HGB 13.6 11.8     Recent Labs   Lab Test 10/18/22  1052   RUQIGG Positive

## 2023-05-06 NOTE — ANESTHESIA POSTPROCEDURE EVALUATION
Patient: Regina Mays    Procedure: Procedure(s):   section       Anesthesia Type:  Epidural    Note:  Disposition: Inpatient   Postop Pain Control: Uneventful            Sign Out: Well controlled pain   PONV: No   Neuro/Psych: Uneventful            Sign Out: Acceptable/Baseline neuro status   Airway/Respiratory: Uneventful            Sign Out: Acceptable/Baseline resp. status   CV/Hemodynamics: Uneventful            Sign Out: Acceptable CV status   Other NRE: NONE   DID A NON-ROUTINE EVENT OCCUR? No           Last vitals:  Vitals Value Taken Time   /70 23 1930   Temp     Pulse 105 23 194   Resp 16 23 1900   SpO2 99 % 23   Vitals shown include unvalidated device data.    Electronically Signed By: Jose M Saavedra MD  May 5, 2023  7:42 PM

## 2023-05-07 PROCEDURE — 120N000002 HC R&B MED SURG/OB UMMC

## 2023-05-07 PROCEDURE — 250N000013 HC RX MED GY IP 250 OP 250 PS 637

## 2023-05-07 PROCEDURE — 250N000013 HC RX MED GY IP 250 OP 250 PS 637: Performed by: STUDENT IN AN ORGANIZED HEALTH CARE EDUCATION/TRAINING PROGRAM

## 2023-05-07 RX ORDER — LIDOCAINE 4 G/G
1 PATCH TOPICAL EVERY 24 HOURS
Status: DISCONTINUED | OUTPATIENT
Start: 2023-05-07 | End: 2023-05-08 | Stop reason: HOSPADM

## 2023-05-07 RX ADMIN — ACETAMINOPHEN 975 MG: 325 TABLET, FILM COATED ORAL at 18:39

## 2023-05-07 RX ADMIN — ACETAMINOPHEN 975 MG: 325 TABLET, FILM COATED ORAL at 12:21

## 2023-05-07 RX ADMIN — IBUPROFEN 800 MG: 800 TABLET ORAL at 06:13

## 2023-05-07 RX ADMIN — ACETAMINOPHEN 975 MG: 325 TABLET, FILM COATED ORAL at 00:15

## 2023-05-07 RX ADMIN — IBUPROFEN 800 MG: 800 TABLET ORAL at 18:39

## 2023-05-07 RX ADMIN — LIDOCAINE PATCH 4% 1 PATCH: 40 PATCH TOPICAL at 18:42

## 2023-05-07 RX ADMIN — IBUPROFEN 800 MG: 800 TABLET ORAL at 12:21

## 2023-05-07 RX ADMIN — SIMETHICONE 80 MG: 80 TABLET, CHEWABLE ORAL at 14:40

## 2023-05-07 RX ADMIN — OXYCODONE HYDROCHLORIDE 5 MG: 5 TABLET ORAL at 15:40

## 2023-05-07 RX ADMIN — ACETAMINOPHEN 975 MG: 325 TABLET, FILM COATED ORAL at 06:13

## 2023-05-07 RX ADMIN — OXYCODONE HYDROCHLORIDE 5 MG: 5 TABLET ORAL at 11:37

## 2023-05-07 RX ADMIN — IBUPROFEN 800 MG: 800 TABLET ORAL at 00:15

## 2023-05-07 RX ADMIN — OXYCODONE HYDROCHLORIDE 5 MG: 5 TABLET ORAL at 19:57

## 2023-05-07 RX ADMIN — SIMETHICONE 80 MG: 80 TABLET, CHEWABLE ORAL at 09:30

## 2023-05-07 ASSESSMENT — ACTIVITIES OF DAILY LIVING (ADL)
ADLS_ACUITY_SCORE: 18
ADLS_ACUITY_SCORE: 22
ADLS_ACUITY_SCORE: 18

## 2023-05-07 NOTE — PROVIDER NOTIFICATION
05/07/23 1556   Provider Notification   Provider Name/Title G2 (no name on sheet)   Method of Notification Electronic Page   Request Evaluate-Remote   Notification Reason Medication Request     pt has left side incision pain. can she get lidocaine patches? thx

## 2023-05-07 NOTE — PLAN OF CARE
Goal Outcome Evaluation:      Plan of Care Reviewed With: patient    Overall Patient Progress: improvingOverall Patient Progress: improving       Data: VSS, postpartum assessments WNL. She is voiding without difficulty, up ad lorenza, eating and drinking without nausea. Incision appears to be healing well, lochia WNL, no s/s infection. Breastfeeding infant with minimal assistance. Taking ibuprofen, tylenol, oxycodone and has a lidocaine patch for incisional pain and Pt reports adequate pain management.   Action: Education provided on plan of care  Response: Pt is agreeable with her plan of care. Positive attachment behaviors observed with infant. Support person,  Carloz, present. Anticipate discharge per care plan.

## 2023-05-07 NOTE — PLAN OF CARE
Goal Outcome Evaluation:       VSS. Postpartum assessment WDL. Patient ambulates independently. Able to perform self cares and care for baby. Eating and drinking without nausea. Pain is managed with tylenol, ibuprofen and oxycodone when needed. Patient is breast feeding baby on cue. Positive attachment behavior observed between mom and baby.  is at bedside and supportive of mom and baby. Continue with POC.

## 2023-05-07 NOTE — PLAN OF CARE
Problem: Plan of Care - These are the overarching goals to be used throughout the patient stay.    Goal: Optimal Comfort and Wellbeing  Outcome: Progressing  Intervention: Monitor Pain and Promote Comfort  Recent Flowsheet Documentation  Taken 2023 1137 by Sonja Boyd RN  Pain Management Interventions: medication (see MAR)   Goal Outcome Evaluation:      Plan of Care Reviewed With: patient    Overall Patient Progress: improvingOverall Patient Progress: improving  VSS. Pain managed with scheduled tylenol and ibuprofen. Oxycodone given x1 for breakthrough pain. Positive bonding with  observed.

## 2023-05-07 NOTE — PROGRESS NOTES
[facility]   Postpartum Note    Name:  Regina Mays  MRN: [id number]    S: Patient is feeling very tired this morning.  Pain medications are helping, but feels like pain control could be better. Moving around is difficult due to pain. Voiding spontaneously. Tolerating regular diet without nausea or vomiting. Passing flatus. Ambulating without dizziness. Lochia appropriate. Breast feeding.     O:   Patient Vitals for the past 24 hrs:   BP Temp Temp src Pulse Resp   23 0324 119/66 98.4  F (36.9  C) Oral 74 16   23 2058 106/58 98.5  F (36.9  C) Oral 68 16   23 1330 114/64 99.3  F (37.4  C) Oral 82 16   23 0812 106/60 99  F (37.2  C) Oral 68 16     Gen:  Resting comfortably. NAD.  CV:  Regular rate. Well perfused.  Pulm:  Non-labored breathing on room air.  Abd:  Soft, appropriately tender, non-distended. Fundus below umbilicus, firm and non-tender. Incision covered with Steri-strips, c/d/i.  Ext:  Non-tender, trace LE edema bilaterally.    No intake/output data recorded.    Hgb:   Hemoglobin   Date Value Ref Range Status   2023 9.9 (L) 11.7 - 15.7 g/dL Final   2014 12.5 11.7 - 15.7 g/dL Final       Assessment/Plan:  Regina Mays 28 year old  on POD #2 s/p STAT PLTCS for category II FHT. Pregnancy notable for Rh negative status. Working on pain control today.     # Postpartum management  - Rh negative, baby negative  - Rubella positive  - Pain: Well controlled with ibuprofen, Tylenol, and oxycodone PRN  - Hgb 13.6> > 9.9  - GI: PRN bowel regimen and antiemetics, simethicone PRN  - : Voiding s/p Aleman  - PPX: Encourage ambulation  - Feed: Breast feeding  - BC: Undecided    Dispo: Anticipate discharge POD#3.    Lillie Reed MD  OB/GYN PGY-1  2023 7:59 AM

## 2023-05-07 NOTE — PROGRESS NOTES
Ilya li:     Regina is doing well. Feels happy with her care and has no concerns regarding her birth. Able to ambulate without dizziness or weakness, but has difficulty with pain. Taking pain meds and managed by MD team. Encouraged ambulation today. Breastfeeding going well. No questions.    Anitha PATEL, GUILLAUMEM, MPH

## 2023-05-08 VITALS
DIASTOLIC BLOOD PRESSURE: 62 MMHG | BODY MASS INDEX: 30.08 KG/M2 | HEART RATE: 72 BPM | OXYGEN SATURATION: 99 % | WEIGHT: 176.19 LBS | TEMPERATURE: 99 F | HEIGHT: 64 IN | RESPIRATION RATE: 16 BRPM | SYSTOLIC BLOOD PRESSURE: 112 MMHG

## 2023-05-08 PROCEDURE — 250N000013 HC RX MED GY IP 250 OP 250 PS 637: Performed by: STUDENT IN AN ORGANIZED HEALTH CARE EDUCATION/TRAINING PROGRAM

## 2023-05-08 RX ORDER — IBUPROFEN 600 MG/1
600 TABLET, FILM COATED ORAL EVERY 6 HOURS PRN
Qty: 30 TABLET | Refills: 0 | Status: SHIPPED | OUTPATIENT
Start: 2023-05-08 | End: 2024-08-15

## 2023-05-08 RX ORDER — OXYCODONE HYDROCHLORIDE 5 MG/1
5 TABLET ORAL EVERY 4 HOURS PRN
Qty: 12 TABLET | Refills: 0 | Status: SHIPPED | OUTPATIENT
Start: 2023-05-08 | End: 2023-05-17

## 2023-05-08 RX ORDER — ACETAMINOPHEN 325 MG/1
650 TABLET ORAL EVERY 6 HOURS PRN
Qty: 100 TABLET | Refills: 0 | Status: SHIPPED | OUTPATIENT
Start: 2023-05-08 | End: 2024-08-15

## 2023-05-08 RX ORDER — AMOXICILLIN 250 MG
1 CAPSULE ORAL DAILY
Qty: 100 TABLET | Refills: 0 | Status: SHIPPED | OUTPATIENT
Start: 2023-05-08 | End: 2023-05-17

## 2023-05-08 RX ADMIN — ACETAMINOPHEN 975 MG: 325 TABLET, FILM COATED ORAL at 13:25

## 2023-05-08 RX ADMIN — ACETAMINOPHEN 975 MG: 325 TABLET, FILM COATED ORAL at 06:38

## 2023-05-08 RX ADMIN — IBUPROFEN 800 MG: 800 TABLET ORAL at 00:26

## 2023-05-08 RX ADMIN — SIMETHICONE 80 MG: 80 TABLET, CHEWABLE ORAL at 13:25

## 2023-05-08 RX ADMIN — IBUPROFEN 800 MG: 800 TABLET ORAL at 06:38

## 2023-05-08 RX ADMIN — SIMETHICONE 80 MG: 80 TABLET, CHEWABLE ORAL at 00:26

## 2023-05-08 RX ADMIN — SIMETHICONE 80 MG: 80 TABLET, CHEWABLE ORAL at 06:38

## 2023-05-08 RX ADMIN — ACETAMINOPHEN 975 MG: 325 TABLET, FILM COATED ORAL at 00:26

## 2023-05-08 RX ADMIN — OXYCODONE HYDROCHLORIDE 5 MG: 5 TABLET ORAL at 11:58

## 2023-05-08 RX ADMIN — IBUPROFEN 800 MG: 800 TABLET ORAL at 13:25

## 2023-05-08 RX ADMIN — OXYCODONE HYDROCHLORIDE 5 MG: 5 TABLET ORAL at 00:26

## 2023-05-08 ASSESSMENT — ACTIVITIES OF DAILY LIVING (ADL)
ADLS_ACUITY_SCORE: 18

## 2023-05-08 NOTE — PLAN OF CARE
Goal Outcome Evaluation:       VSS. Postpartum assessment WDL. Patient ambulating independently. Patient able to perform self cares and care of baby. Patient is breast feeding baby independently. Drinking and eating without nausea. Pain managed with tylenol, ibuprofen, oxycodone and a lidocaine patch. Patient states pain is well managed. Positive attachment behavior observed between mom and baby. Support person,  Carloz, at bedside and attentive to mom and baby. Continue with POC.

## 2023-05-08 NOTE — PLAN OF CARE
Problem: Plan of Care - These are the overarching goals to be used throughout the patient stay.    Goal: Readiness for Transition of Care  Outcome: Met     Problem: Plan of Care - These are the overarching goals to be used throughout the patient stay.    Goal: Optimal Comfort and Wellbeing  Outcome: Met     Problem: Postpartum ( Delivery)  Goal: Successful Maternal Role Transition  Outcome: Met   Goal Outcome Evaluation:      Plan of Care Reviewed With: patient, spouse      Patient VSS and WNL.  Patient is up and voiding. Patient is eating and drinking, patient is bonding well with infant. Patient has pain but is controlled with Ibuprofen, tylenol and oxycodone intermittantly. Patient is breastfeeding infant on demand. Patient is attentive to infant needs. Patient is to discharge to home with infant.

## 2023-05-08 NOTE — DISCHARGE INSTRUCTIONS
Postop  Birth Instructions    Activity     Do not lift more than 10 pounds for 6 weeks after surgery.  Ask family and friends for help when you need it.  No driving until you have stopped taking your pain medications (usually two weeks after surgery).  No heavy exercise or activity for 6 weeks.  Don't do anything that will put a strain on your surgery site.  Don't strain when using the toilet.  Your care team may prescribe a stool softener if you have problems with your bowel movements.     To care for your incision:     Keep the incision clean and dry.  Do not soak your incision in water. No swimming or hot tubs until it has fully healed. You may soak in the bathtub if the water level is below your incision.  Do not use peroxide, gel, cream, lotion, or ointment on your incision.  Adjust your clothes to avoid pressure on your surgery site (check the elastic in your underwear for example).     You may see a small amount of clear or pink drainage and this is normal.  Check with your health care provider:     If the drainage increases or has an odor.  If the incision reddens, you have swelling, or develop a rash.  If you have increased pain and the medicine we prescribed doesn't help.  If you have a fever above 100.4 F (38 C) with or without chills when placing thermometer under your tongue.   The area around your incision (surgery wound), will feel numb.  This is normal. The numbness should go away in less than a year.     Keep your hands clean:  Always wash your hands before touching your incision (surgery wound). This helps reduce your risk of infection. If your hands aren't dirty, you may use an alcohol hand-rub to clean your hands. Keep your nails clean and short.    Call your healthcare provider if you have any of these symptoms:     You soak a sanitary pad with blood within 1 hour, or you see blood clots larger than a golf ball.  Bleeding that lasts more than 6 weeks.  Vaginal discharge that smells  bad.  Severe pain, cramping or tenderness in your lower belly area.  A need to urinate more frequently (use the toilet more often), more urgently (use the toilet very quickly), or it burns when you urinate.  Nausea and vomiting.  Redness, swelling or pain around a vein in your leg.  Problems breastfeeding or a red or painful area on your breast.  Chest pain and cough or are gasping for air.  Problems with coping with sadness, anxiety or depression. If you have concerns about hurting yourself or the baby, call your provider immediately.    You have questions or concerns after you return home.                  Warning Signs after Having a Baby    Keep this paper on your fridge or somewhere else where you can see it.    Call your provider if you have any of these symptoms up to 12 weeks after having your baby.    Thoughts of hurting yourself or your baby  Pain in your chest or trouble breathing  Severe headache not helped by pain medicine  Eyesight concerns (blurry vision, seeing spots or flashes of light, other changes to eyesight)  Fainting, shaking or other signs of a seizure    Call 9-1-1 if you feel that it is an emergency.     The symptoms below can happen to anyone after giving birth. They can be very serious. Call your provider if you have any of these warning signs.    My provider s phone number: _______________________    Losing too much blood (hemorrhage)    Call your provider if you soak through a pad in less than an hour or pass blood clots bigger than a golf ball. These may be signs that you are bleeding too much.    Blood clots in the legs or lungs    After you give birth, your body naturally clots its blood to help prevent blood loss. Sometimes this increased clotting can happen in other areas of the body, like the legs or lungs. This can block your blood flow and be very dangerous.     Call your provider if you:  Have a red, swollen spot on the back of your leg that is warm or painful when you touch  it.   Are coughing up blood.     Infection    Call your provider if you have any of these symptoms:  Fever of 100.4 F (38 C) or higher.  Pain or redness around your stitches if you had an incision.   Any yellow, white, or green fluid coming from places where you had stitches or surgery.    Mood Problems (postpartum depression)    Many people feel sad or have mood changes after having a baby. But for some people, these mood swings are worse.     Call your provider right away if you feel so anxious or nervous that you can't care for yourself or your baby.    Preeclampsia (high blood pressure)    Even if you didn't have high blood pressure when you were pregnant, you are at risk for the high blood pressure disease called preeclampsia. This risk can last up to 12 weeks after giving birth.     Call your provider if you have:   Pain on your right side under your rib cage  Sudden swelling in the hands and face    Remember: You know your body. If something doesn't feel right, get medical help.     For informational purposes only. Not to replace the advice of your health care provider. Copyright 2020 [facility]. All rights reserved. Clinically reviewed by Melva Cedeno, RNC-OB, MSN. [facility] [id number] - Rev 02/23.

## 2023-05-08 NOTE — PROGRESS NOTES
[facility]   Postpartum Note    Name:  Regina Mays  MRN: [id number]    S: Patient is feeling overall well this morning. Pain well controlled. Voiding spontaneously. Tolerating regular diet without nausea or vomiting. Passing flatus. Ambulating without dizziness. Lochia appropriate. Breast feeding but with some difficulty. Would like to be discharged today after meeting with the lactation consultant.     O:   Patient Vitals for the past 24 hrs:   BP Temp Temp src Pulse Resp   23 1700 123/83 98  F (36.7  C) Axillary 83 16   23 0930 112/66 99  F (37.2  C) Axillary 81 16   23 0324 119/66 98.4  F (36.9  C) Oral 74 16     Gen:  Resting comfortably. NAD.  CV:  Regular rate. Well perfused.  Pulm:  Non-labored breathing on room air.  Abd:  Soft, appropriately tender, non-distended. Fundus below umbilicus, firm and non-tender. Incision covered with Steri-strips, c/d/i    Ext:  Non-tender, trace LE edema bilaterally.    Hgb:   Hemoglobin   Date Value Ref Range Status   2023 9.9 (L) 11.7 - 15.7 g/dL Final   2014 12.5 11.7 - 15.7 g/dL Final       Assessment/Plan:  Regina Mays 28 year old  on POD#3 s/p STAT PLTCS for category II FHT. Pregnancy notable for Rh negative status. Ready for discharge following evaluation by lactation.     # Postpartum management  - Rh negative, baby negative  - Rubella positive  - Pain: Well controlled with ibuprofen, Tylenol, and oxycodone PRN  - Hgb 13.6> > 9.9  - GI: PRN bowel regimen and antiemetics, simethicone PRN  - : Voiding s/p Aleman  - PPX: Encourage ambulation  - Feed: Breast feeding  - BC: Undecided    Dispo: Anticipate discharge today following lactation.      Diana Morgan MD   OBGYN resident PGY2  23 6:49 AM      Patient seen and examined by me, agree with above resident note. Doing well and meeting all goals, stable for discharge.  Instructions given.  RTC 6 weeks.  Questions answered    KEVIN  RANDELL BURNS MD

## 2023-05-08 NOTE — LACTATION NOTE
This note was copied from a baby's chart.  Consult for: First time breastfeeding, nurses introduced nipple shield due to inability to sustain latch or sucking without it. >10% weight loss 60 hours of age.     History:   delivery @ 39w6d, AGA with 5.3% weight loss at 24 hours and 10.6% from birthweight at 60 hours of age. Diaper output beyond expected for age, low risk serum bilirubin. No significant medical history for mom.     Breast exam of mom: Soft, symmetric with intact, everted nipples bilaterally. She noted significant tenderness and breast growth in pregnancy.    Oral exam of baby: Normal arch to palate, mildly recessed lower jaw with good tongue extension well beyond lower lips.     Feeding assessment:  Infant attempts to latch at breast with full assist, pulls away and even with brief latches without would not sustain. Using nipple shield he would sustain latch but often pursed lips using oral accessory muscles and biting intermittently, no sustained sucking (4-7 sucks seen in a row). One swallow heard and there was milk visible in shield when he came off. We hand expressed after and gave two partial spoons back to baby, dad return demo on spoon feeds.     Education provided: Discussed positioning with good support, anatomy of breast and infant mouth with ways to get and maintain deeper latch, breast compressions prn to enhance milk transfer, nutritive vs. non-nutritive sucking and listening/looking for swallows, benefits of skin to skin and feeding on cue recommend no longer than 3 hours between for now due to weight loss, benefits of and how to do breast massage & hand expression, talked through hands on pumping. Reviewed how to tell when satiated and if getting enough, breastfeeding log with when and who to call if concerns and lactation resources for after discharge. They plan follow up at a [place] clinic, will ask them who they recommend for lactation support but also gave [facility] lactation  resources.    Spoke with Dr. Little at desk re: concern for poor feeding, nipple shield and >10% weight loss. He agree with recommended follow up tomorrow for weight check and ok with suggestion of extra milk supplements after each feed until then. Parents have appt. Set for [facility] for tomorrow.     Discharge Feeding Plan >10% loss from birthweight:    Weight Change Since Birth: -11%     *Breastfeed your baby on cue as often as they want but no longer than 3 hours between start of one feeding to start of the next one.     *Limit time at breast to about 30 minutes for now, until your supply has increased and baby is gaining weight. This will save baby's energy and allow you time for pumping.      *Practice hands on pumping and hand expression after each feeding, give all that you are able to express. Add pasteurized human donor milk or formula if needed to get enough for supplements (if he is still cueing for more you could give extra milk or ask your provider if he needs additional milk beyond what you are pumping at tomorrow's visit).     *Continue tracking feedings and diaper output on breastfeeding log, call if not meeting goals or any concerns not getting enough.     *Rest as much as possible between feeding/pumping, self care will also help with your recovery and milk supply. Try to drink enough to keep your urine clear yellow and eat frequent meals, snacks.     *When greater than 10% weight loss since birth, recommend daily weight checks until baby is gaining well. Also make follow up with outpatient lactation consultant within 5-7 days for support with feedings and milk supply. Follow up with your baby's provider, home care nurse or lactation provider tomorrow for breastfeeding support and return weight check.

## 2023-05-12 ENCOUNTER — OFFICE VISIT (OUTPATIENT)
Dept: MIDWIFE SERVICES | Facility: CLINIC | Age: 28
End: 2023-05-12
Payer: COMMERCIAL

## 2023-05-12 VITALS
WEIGHT: 169 LBS | DIASTOLIC BLOOD PRESSURE: 74 MMHG | TEMPERATURE: 99.1 F | BODY MASS INDEX: 29.01 KG/M2 | HEART RATE: 72 BPM | OXYGEN SATURATION: 100 % | SYSTOLIC BLOOD PRESSURE: 124 MMHG

## 2023-05-12 PROCEDURE — 99024 POSTOP FOLLOW-UP VISIT: CPT | Performed by: ADVANCED PRACTICE MIDWIFE

## 2023-05-12 NOTE — PROGRESS NOTES
S:  Regina is here for two week postpartum CS incision check. Overall she is feeling well. Moving around without pain. Some cramping with breastfeeding. Bleeding is light. No signs of infection. She denies fever, chills, discharge from incision or vagina.     O:  /74   Pulse 72   Temp 99.1  F (37.3  C)   Wt 76.7 kg (169 lb)   LMP 07/30/2022   SpO2 100%   Breastfeeding Yes   BMI 29.01 kg/m    CS incision scar is clean, dry, nontender, no errythema. Steri-strips are still intact. Removed by CNM with alcohol pads.    A/P:  (Z39.2) Routine postpartum follow-up  (primary encounter diagnosis)  Comment: Discussed birth control options. She would like Mirena IUD. Plan to insert at 8w postpartum. Discussed CS and avoiding pregnancy x 1 year. Briefly talked about options of repeat CS vs TOLAC for next pregnancy.      JORGE CastilloM
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
## 2023-05-17 ENCOUNTER — OFFICE VISIT (OUTPATIENT)
Dept: OBGYN | Facility: CLINIC | Age: 28
End: 2023-05-17
Payer: COMMERCIAL

## 2023-05-17 VITALS
HEART RATE: 99 BPM | WEIGHT: 166 LBS | SYSTOLIC BLOOD PRESSURE: 115 MMHG | OXYGEN SATURATION: 100 % | BODY MASS INDEX: 28.49 KG/M2 | DIASTOLIC BLOOD PRESSURE: 70 MMHG

## 2023-05-17 DIAGNOSIS — O92.79 OTHER DISORDERS OF LACTATION: Primary | ICD-10-CM

## 2023-05-17 PROCEDURE — 99215 OFFICE O/P EST HI 40 MIN: CPT | Mod: 24 | Performed by: ADVANCED PRACTICE MIDWIFE

## 2023-05-17 PROCEDURE — 99417 PROLNG OP E/M EACH 15 MIN: CPT | Mod: 24 | Performed by: ADVANCED PRACTICE MIDWIFE

## 2023-05-17 ASSESSMENT — EDINBURGH POSTNATAL DEPRESSION SCALE (EPDS)
I HAVE BEEN SO UNHAPPY THAT I HAVE BEEN CRYING: NO, NEVER
I HAVE BEEN SO UNHAPPY THAT I HAVE HAD DIFFICULTY SLEEPING: NOT AT ALL
I HAVE LOOKED FORWARD WITH ENJOYMENT TO THINGS: AS MUCH AS I EVER DID
I HAVE BEEN ANXIOUS OR WORRIED FOR NO GOOD REASON: NO, NOT AT ALL
THE THOUGHT OF HARMING MYSELF HAS OCCURRED TO ME: NEVER
I HAVE BLAMED MYSELF UNNECESSARILY WHEN THINGS WENT WRONG: NO, NEVER
I HAVE FELT SAD OR MISERABLE: NO, NOT AT ALL
THINGS HAVE BEEN GETTING ON TOP OF ME: NO, I HAVE BEEN COPING AS WELL AS EVER
TOTAL SCORE: 0
I HAVE BEEN ABLE TO LAUGH AND SEE THE FUNNY SIDE OF THINGS: AS MUCH AS I ALWAYS COULD
I HAVE FELT SCARED OR PANICKY FOR NO GOOD REASON: NO, NOT AT ALL

## 2023-06-13 PROBLEM — Z34.80 SUPERVISION OF OTHER NORMAL PREGNANCY, ANTEPARTUM: Status: RESOLVED | Noted: 2022-10-18 | Resolved: 2023-05-12

## 2023-07-02 ENCOUNTER — HEALTH MAINTENANCE LETTER (OUTPATIENT)
Age: 28
End: 2023-07-02

## 2023-07-25 ENCOUNTER — PRENATAL OFFICE VISIT (OUTPATIENT)
Dept: OBGYN | Facility: CLINIC | Age: 28
End: 2023-07-25
Payer: COMMERCIAL

## 2023-07-25 VITALS
WEIGHT: 161 LBS | HEART RATE: 77 BPM | OXYGEN SATURATION: 98 % | DIASTOLIC BLOOD PRESSURE: 67 MMHG | BODY MASS INDEX: 27.64 KG/M2 | SYSTOLIC BLOOD PRESSURE: 107 MMHG

## 2023-07-25 PROCEDURE — 99207 PR POST PARTUM EXAM: CPT | Performed by: OBSTETRICS & GYNECOLOGY

## 2023-07-25 ASSESSMENT — ANXIETY QUESTIONNAIRES
7. FEELING AFRAID AS IF SOMETHING AWFUL MIGHT HAPPEN: NOT AT ALL
2. NOT BEING ABLE TO STOP OR CONTROL WORRYING: NOT AT ALL
1. FEELING NERVOUS, ANXIOUS, OR ON EDGE: SEVERAL DAYS
5. BEING SO RESTLESS THAT IT IS HARD TO SIT STILL: NOT AT ALL
6. BECOMING EASILY ANNOYED OR IRRITABLE: NOT AT ALL
GAD7 TOTAL SCORE: 1
GAD7 TOTAL SCORE: 1
3. WORRYING TOO MUCH ABOUT DIFFERENT THINGS: NOT AT ALL

## 2023-07-25 ASSESSMENT — PATIENT HEALTH QUESTIONNAIRE - PHQ9
SUM OF ALL RESPONSES TO PHQ QUESTIONS 1-9: 2
5. POOR APPETITE OR OVEREATING: NOT AT ALL

## 2023-07-25 NOTE — PROGRESS NOTES
OB GYN CLINIC VISIT  2023  CC: postpartum visit    SUBJECTIVE:  Tyra Mays is a 28 year old female  here for a postpartum visit.  She had a  Section  on 23 delivering a healthy baby boy weighing 6 lbs 13 oz at 39w6d for category 2 fetal tracing    Postpartum course complicated by abdominal tenderness - skin just feels really sensitive. Certain pants are not comfortable. Incision feels fine.         2023     9:05 AM 2023    10:48 AM   PHQ-9 SCORE   PHQ-9 Total Score 1 2         2023     9:05 AM 2023    10:48 AM   CONNOR-7 SCORE   Total Score 2 1     OB History    Para Term  AB Living   2 1 1 0 1 1   SAB IAB Ectopic Multiple Live Births   0 0 0 0 1      # Outcome Date GA Lbr Alberto/2nd Weight Sex Delivery Anes PTL Lv   2 Term 23 39w6d  3.09 kg (6 lb 13 oz) M CS-LTranv EPI N GILLIAN      Name: KAYLEEN,MALE-TYRA      Apgar1: 7  Apgar5: 9   1 AB 22 9w3d            Tyra Mays is a 28 year old  at 39w6d admitted for spontaneous labor. Tyra progressed to 9cm but had a category II tracing for over an hour. A category II huddle was called and a primary  section was recommended. The risks, benefits, and alternatives of  section were discussed with the patient, and she agreed to proceed. The patient was consented for a blood transfusion. In the OR the fetal heart tones were noted to be bradycardic and the  section proceeded in STAT fashion..        delivery complications:  STAT CS  breast feeding:  Yes, breast feeding and pumping. Possible clog on the left  bladder problems:  No  bowel problems/hemorrhoids:  No  episiotomy/laceration/incision healed? Yes  vaginal flow:  None  [place]:  No  contraception:  condoms  emotional adjustment:  doing well and happy  back to work: Sept    Current Outpatient Medications   Medication    acetaminophen (TYLENOL) 325 MG tablet    fish oil-omega-3 fatty acids 1000 MG capsule     ibuprofen (ADVIL/MOTRIN) 600 MG tablet    Prenatal Vit-DSS-Fe Cbn-FA (PRENATAL AD PO)     No current facility-administered medications for this visit.       OBJECTIVE:  Blood pressure 107/67, pulse 77, weight 73 kg (161 lb), SpO2 98 %, currently breastfeeding.   General - pleasant female in no acute distress.  Breast - left breast engorged and firm, no erythema, no nodularity, asymmetry or nipple discharge bilaterally.  Abdomen - soft, nontender, nondistended, no hepatosplenomegaly.  Incision - well approximated, healing without signs of disruption or infection  Pelvic - deferred  Rectovaginal - deferred.    ASSESSMENT:  28 year old  with normal postpartum exam    PLAN:  1. Encounter for postpartum visit    Discussed kegel exercises   May resume normal activities without restrictions  Pap smear was not  done today. Next due in .    The patient will use condoms for birth control. Full counseling was provided, and all questions answered. Compliance is strongly emphasized.Recommend waiting at least 1 year prior to trying to conceive.   Return to clinic in one year for an annual, when due for a pap smear or PRN.    Lora Helms MD

## 2023-07-25 NOTE — PROGRESS NOTES
IUD Insertion:  CONSULT:    Is a pregnancy test required: {Pregnancy test required:114051}  Was a consent obtained?  {Yes/No:606178}    Subjective: Regina Mays is a 28 year old  presents for IUD and desires {OB IUD TYPE:873431} type IUD.    Patient has been given the opportunity to ask questions about all forms of birth control, including all options appropriate for Regina Mays. Discussed that no method of birth control, except abstinence is 100% effective against pregnancy or sexually transmitted infection.     Regina Mays understands she may have the IUD removed at any time. IUD should be removed by a health care provider.    The entire insertion procedure was reviewed with the patient, including care after placement.    No LMP recorded. (Menstrual status: Postpartum). {last sex (Optional):185243}. No allergy to betadine or shellfish. {std screenin}  HCG Qual Urine   Date Value Ref Range Status   2012 Negative NEG Final     hCG Urine Qualitative   Date Value Ref Range Status   2022 Negative Negative Final     Comment:     This test is for screening purposes.  Results should be interpreted along with the clinical picture.  Confirmation testing is available if warranted by ordering [id number], HCG Quantitative Pregnancy.         There were no vitals taken for this visit.    Pelvic Exam:   EG/BUS: normal genital architecture without lesions, erythema or abnormal secretions.   Vagina: moist, pink, rugae with physiologic discharge and secretions  Cervix: ***parous no lesions and pink, moist, closed, without lesion or CMT  Uterus: ***position, mobile, no pain  Adnexa: within normal limits and no masses, nodularity, tenderness    PROCEDURE NOTE: -- IUD Insertion    Reason for Insertion: {IUD reasons:490298}    {IUD pain:832350}  Under sterile technique, cervix was visualized with speculum and prepped with {cleansing agents:938557} solution swab x 3. {IUD instruments:715320} was placed  for stability. The uterus was gently straightened and sounded to {IUD SOUNDING DEPTHS:245207} cm. IUD prepared for placement, and IUD inserted according to 's instructions without difficulty or significant resitance, and deployed at the fundus. The strings were visualized and trimmed to {IUD SOUNDING DEPTHS:958651} cm from the external os. Tenaculum was removed and hemostasis noted. Speculum removed.  Patient tolerated procedure well.    Lot # ***  Exp: ***    EBL: minimal    Complications: none    ASSESSMENT:     ICD-10-CM    1. Encounter for postpartum visit  Z39.2       2. Encounter for IUD insertion  Z30.430       3. Encounter for insertion of intrauterine contraceptive device  Z30.430              PLAN:    Given 's handouts, including when to have IUD removed, list of danger s/sx, side effects and follow up recommended. Encouraged condom use for prevention of STD. Back up contraception advised for 7 days if progestin method. Advised to call for any fever, for prolonged or severe pain or bleeding, abnormal vaginal discharge, or unable to palpate strings. She was advised to use pain medications (ibuprofen) as needed for mild to moderate pain. Advised to follow-up in clinic in 4-6 weeks for IUD string check if unable to find strings or as directed by provider.     Lora Helms MD

## 2023-09-26 NOTE — PATIENT INSTRUCTIONS
"  1.  Use good positioning for deep latch, with baby held close to body and baby's head/shoulders/hips in good alignment.  When in a seated position, use a pillow to help bring baby close to breasts, and stepstool to elevate your knees above hips.   2.   Present breast in the \"sandwich\" hold, compressing breast vertically and in line with baby's mouth, for baby to get a larger mouthful of breast and a deeper latch.  Point your nipple up to Donald's mouth so that he can sense it easily and is able to latch more quickly.  3.  Babies latch best to the breast by bringing their chin in first, so point your nipple towards baby's nose, tuck the chin in close, and then wait for his mouth to open.  When his mouth opens, bring his head in deeply.  Baby's chin should be snugged deeply in your breast, their upper cheeks should be touching the breast, and their nose just out of the breast.  4. Continue to use the nipple shield for now;  Try the larger size shield on your left side to see if this improves your comfort.  Once he is doing well and is getting all of the milk he needs from your breast, you can work towards weaning from it completely.   He may eventually latch more deeply and comfortably without the shield  5.  Donald needs about 17 oz of milk each day to grow well.  If he nurses at home as he did in the office today, he is getting plenty of milk and does not need extra in bottles.  Discuss with your pediatric provider a plan for decreasing bottlefeeding.  6. Once babies are 9-10 pounds, they need about 25-30 oz/day (or 3-4 oz/feeding), and this where their needs stay for their entire first year;  you don't need to keep producing more and more milk as he grows.  7.  [place] with different positions to see if this helps your nipple comfort.  Make sure that Dav is grasping as much of your breast as possible and stays close to your body.  To help with nipple pain after feeding, you can use coconut oil, gelpads or " silver nipple covers (Silverettes).  Do not use any ointment or creams on your nipples if you are using the gelpads or Silverettes.  8.  Now that you have established a good milk supply, you can decrease your pumping.  Right now you are just giving about 10 oz/day by bottle, so this is all you need to pump.  To keep your breasts from becoming overly full, slowly decrease the amount of milk you are taking out of your breasts.  For example, instead of pumping until 4 oz come, stop after 3.  Then a day or two later drop back by another ounce, and then you will be pumping just what you need to give to Dav.  If he needs even less than that because he is nursing better, you can continue to drop back on each session this way, until you are just pumping the amount that you want to pump.  9.  Follow up with lactation as needed, and pediatric provider as planned later today.  [employer] can be used for brief questions, but it's important to know that messages are not seen Friday through Sunday. If urgent help is needed, Monday through Friday you can call [phone] and one of our lactation consultants will get the message and respond; if you need a rapid response over a weekend or holiday, it is best to call your on-call maternity or pediatric provider.  Please feel free to schedule a return visit if the concern is more detailed;  telephone visits are also an option if you don't feel you need to be seen in person.      ______________________    For weaning from nipple shield, try without the shield when your baby is calm and willing to eat, but not frantic.  Sometimes it works best when they are sleepy, or even nearly asleep.     You can try starting some feedings without the shield in place.  If baby does not do well, you can move to using it to finish the feeding.  Alternatively, you can start the feeding with the shield in place and then taking it off once baby is more satisfied and calm and the milk is flowing well.   Some feedings may go well without the shield, and some not--don't panic!   Sometimes weaning from the shield can take a few weeks.  Be patient, because it is almost always successful in the end.    Here is an excellent article that goes through some steps for weaning from the shield:    https://www.[employer]/blog/my-ten-step-process-for-weaning-from-the-nipple-shield/     ________________    Good breastfeeding resources:    Websites:  www.[employer]  www.[employer]/blog/  www.[employer].org/breastfeeding-info/    Instagram:    [email or web address]  [email or web address]    Books:   The Womanly Art of Breastfeeding by [employer]  Breastfeeding Doesn't Need to Suck, by Emma Ahmadi      For help with using baby carriers:  https://babywearingtwincities.org/    None

## 2024-01-25 NOTE — TELEPHONE ENCOUNTER
Is This A New Presentation, Or A Follow-Up?: Skin Lesion Refill Approved    Rx renewed per Medication Renewal Policy. Medication was last renewed on 6/1/20.    Melissa Bowen, [facility] Triage/Med Refill 11/12/2020     Requested Prescriptions   Pending Prescriptions Disp Refills     BLISOVI FE 1.5/30, 28, 1.5 mg-30 mcg (21)/75 mg (7) per tablet 112 tablet 0     Sig: Take 1 tablet by mouth daily.       Oral Contraceptives Protocol Passed - 11/9/2020 10:06 AM        Passed - Visit with PCP or prescribing provider visit in last 12 months      Last office visit with prescriber/PCP: 5/2/2019 Tootie Hyatt CNP OR same dept: Visit date not found OR same specialty: 10/12/2020 Conchis Whittington MD  Last physical: 8/15/2018 Last MTM visit: Visit date not found   Next visit within 3 mo: Visit date not found  Next physical within 3 mo: Visit date not found  Prescriber OR PCP: Tootie Hyatt CNP  Last diagnosis associated with med order: 1. Uses birth control  - BLISOVI FE 1.5/30, 28, 1.5 mg-30 mcg (21)/75 mg (7) per tablet; Take 1 tablet by mouth daily.  Dispense: 112 tablet; Refill: 0    If protocol passes may refill for 12 months if within 3 months of last provider visit (or a total of 15 months).                             What Type Of Note Output Would You Prefer (Optional)?: Standard Output Has Your Skin Lesion Been Treated?: not been treated

## 2024-03-05 ENCOUNTER — LAB (OUTPATIENT)
Dept: LAB | Facility: CLINIC | Age: 29
End: 2024-03-05
Payer: COMMERCIAL

## 2024-03-05 ENCOUNTER — VIRTUAL VISIT (OUTPATIENT)
Dept: FAMILY MEDICINE | Facility: CLINIC | Age: 29
End: 2024-03-05
Payer: COMMERCIAL

## 2024-03-05 DIAGNOSIS — J02.9 SORE THROAT: ICD-10-CM

## 2024-03-05 DIAGNOSIS — J06.9 VIRAL UPPER RESPIRATORY TRACT INFECTION: Primary | ICD-10-CM

## 2024-03-05 DIAGNOSIS — J06.9 VIRAL UPPER RESPIRATORY TRACT INFECTION: ICD-10-CM

## 2024-03-05 LAB
DEPRECATED S PYO AG THROAT QL EIA: NEGATIVE
FLUAV RNA SPEC QL NAA+PROBE: NEGATIVE
FLUBV RNA RESP QL NAA+PROBE: NEGATIVE
GROUP A STREP BY PCR: NOT DETECTED
RSV RNA SPEC NAA+PROBE: NEGATIVE
SARS-COV-2 RNA RESP QL NAA+PROBE: NEGATIVE

## 2024-03-05 PROCEDURE — 87637 SARSCOV2&INF A&B&RSV AMP PRB: CPT

## 2024-03-05 PROCEDURE — 87651 STREP A DNA AMP PROBE: CPT | Performed by: STUDENT IN AN ORGANIZED HEALTH CARE EDUCATION/TRAINING PROGRAM

## 2024-03-05 PROCEDURE — 99214 OFFICE O/P EST MOD 30 MIN: CPT | Mod: 95 | Performed by: STUDENT IN AN ORGANIZED HEALTH CARE EDUCATION/TRAINING PROGRAM

## 2024-03-05 NOTE — PROGRESS NOTES
"Regina is a 28 year old who is being evaluated via a billable video visit.      How would you like to obtain your AVS? MyChart  If the video visit is dropped, the invitation should be resent by: Text to cell phone: [phone]  Will anyone else be joining your video visit? No          Assessment & Plan     (J06.9) Viral upper respiratory tract infection  (primary encounter diagnosis)  Comment: Acute, uncontrolled. Will rule out for possible sources of infection. Informed pt that she will be treated if any are positive except for RSV. Encouraged at home remedies and rest for the time being. Lab visit scheduled  Plan: Symptomatic Influenza A/B, RSV, & SARS-CoV2 PCR        (COVID-19) Nasopharyngeal            (J02.9) Sore throat  Comment: Acute, uncontrolled. See above  Plan: Streptococcus A Rapid Screen w/Reflex to PCR -         Clinic Collect                        BMI  Estimated body mass index is 27.64 kg/m  as calculated from the following:    Height as of 5/5/23: 1.626 m (5' 4\").    Weight as of 7/25/23: 73 kg (161 lb).   Weight management plan: Patient was referred to their PCP to discuss a diet and exercise plan.          Subjective   Regina is a 28 year old, presenting for the following health issues:  Headache    History of Present Illness       Headaches:   Since the patient's last clinic visit, headaches are: worsened  The patient is getting headaches:  All day  She is not able to do normal daily activities when she has a migraine.  The patient is taking the following rescue/relief medications:  Ibuprofen (Advil, Motrin) and Tylenol   Patient states \"I get only a small amount of relief\" from the rescue/relief medications.   The patient is taking the following medications to prevent migraines:  No medications to prevent migraines  In the past 4 weeks, the patient has gone to an Urgent Care or Emergency Room 0 times times due to headaches.    Reason for visit:  Sore throat, neck pain, fatigue  Symptom onset: "  1-3 days ago  Symptom intensity:  Moderate  Symptom progression:  Worsening  Had these symptoms before:  No    She eats 2-3 servings of fruits and vegetables daily.She consumes 0 sweetened beverage(s) daily.She exercises with enough effort to increase her heart rate 9 or less minutes per day.  She exercises with enough effort to increase her heart rate 3 or less days per week.      Pt reports she woke this morning with a sore throat. She reports having neck pain on the back side of her neck. She felt the pain in her neck first and then starting having the sore throat. She reports feeling fatigue and does endorse taking an at home covid test- which was negative    She reports suffering from a history of migraines and her symptoms appear to be worsening her headache symptoms.    She endorses taking advil for neck pain and also dayquil and nightquil             ROS: 10 point ROS neg other than the symptoms noted above in the HPI.        Objective           Vitals:  No vitals were obtained today due to virtual visit.    Physical Exam   GENERAL: alert and no distress  EYES: Eyes grossly normal to inspection.  No discharge or erythema, or obvious scleral/conjunctival abnormalities.  RESP: No audible wheeze, cough, or visible cyanosis.    SKIN: Visible skin clear. No significant rash, abnormal pigmentation or lesions.  NEURO: Cranial nerves grossly intact.  Mentation and speech appropriate for age.  PSYCH: Appropriate affect, tone, and pace of words          Video-Visit Details    Type of service:  Video Visit     Originating Location (pt. Location): Home    Distant Location (provider location):  On-site  Platform used for Video Visit: Tim  Signed Electronically by: MANSOOR DON MD

## 2024-03-07 ENCOUNTER — OFFICE VISIT (OUTPATIENT)
Dept: URGENT CARE | Facility: URGENT CARE | Age: 29
End: 2024-03-07
Payer: COMMERCIAL

## 2024-03-07 VITALS
WEIGHT: 144.2 LBS | BODY MASS INDEX: 24.75 KG/M2 | HEART RATE: 91 BPM | DIASTOLIC BLOOD PRESSURE: 72 MMHG | RESPIRATION RATE: 16 BRPM | OXYGEN SATURATION: 96 % | SYSTOLIC BLOOD PRESSURE: 111 MMHG | TEMPERATURE: 97.2 F

## 2024-03-07 DIAGNOSIS — J03.90 TONSILLITIS: Primary | ICD-10-CM

## 2024-03-07 LAB — MONOCYTES NFR BLD AUTO: NEGATIVE %

## 2024-03-07 PROCEDURE — 36415 COLL VENOUS BLD VENIPUNCTURE: CPT | Performed by: PHYSICIAN ASSISTANT

## 2024-03-07 PROCEDURE — 99213 OFFICE O/P EST LOW 20 MIN: CPT | Performed by: PHYSICIAN ASSISTANT

## 2024-03-07 PROCEDURE — 86308 HETEROPHILE ANTIBODY SCREEN: CPT | Performed by: PHYSICIAN ASSISTANT

## 2024-03-07 RX ORDER — AMOXICILLIN 500 MG/1
500 CAPSULE ORAL 2 TIMES DAILY
Qty: 20 CAPSULE | Refills: 0 | Status: SHIPPED | OUTPATIENT
Start: 2024-03-07 | End: 2024-03-12

## 2024-03-07 ASSESSMENT — ENCOUNTER SYMPTOMS
FEVER: 0
SHORTNESS OF BREATH: 0
DIARRHEA: 0
SINUS PRESSURE: 0
NAUSEA: 0
CHILLS: 0
NECK PAIN: 1
VOMITING: 0
NECK STIFFNESS: 0
CARDIOVASCULAR NEGATIVE: 1
PALPITATIONS: 0
RHINORRHEA: 0
FATIGUE: 1
COUGH: 1
SINUS PAIN: 0
WHEEZING: 0
SORE THROAT: 1
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
## 2024-03-10 ENCOUNTER — MYC MEDICAL ADVICE (OUTPATIENT)
Dept: FAMILY MEDICINE | Facility: CLINIC | Age: 29
End: 2024-03-10
Payer: COMMERCIAL

## 2024-03-10 DIAGNOSIS — J03.90 TONSILLITIS: Primary | ICD-10-CM

## 2024-03-12 RX ORDER — AZITHROMYCIN 500 MG/1
500 TABLET, FILM COATED ORAL DAILY
Qty: 5 TABLET | Refills: 0 | Status: SHIPPED | OUTPATIENT
Start: 2024-03-12 | End: 2024-03-17

## 2024-03-12 NOTE — TELEPHONE ENCOUNTER
Pt should discontinue medication    Azithromycin 500 mg once a day for 5 days has been provided    [facility] [place]

## 2024-03-12 NOTE — TELEPHONE ENCOUNTER
Patient was seen by urgent care on 3/7/2024 and prescribed Amoxicillin for tonsillitis  Having possible reaction to med- itchy face.  Please advise    Olga Medina RN  [facility]

## 2024-08-15 ENCOUNTER — VIRTUAL VISIT (OUTPATIENT)
Dept: OBGYN | Facility: CLINIC | Age: 29
End: 2024-08-15
Payer: COMMERCIAL

## 2024-08-15 VITALS — HEIGHT: 63 IN | BODY MASS INDEX: 25.54 KG/M2

## 2024-08-15 DIAGNOSIS — Z23 NEED FOR DIPHTHERIA-TETANUS-PERTUSSIS (TDAP) VACCINE: ICD-10-CM

## 2024-08-15 DIAGNOSIS — Z34.90 ENCOUNTER FOR SUPERVISION OF NORMAL PREGNANCY: Primary | ICD-10-CM

## 2024-08-15 DIAGNOSIS — Z98.891 HISTORY OF CESAREAN SECTION: ICD-10-CM

## 2024-08-15 PROCEDURE — 99207 PR NO CHARGE NURSE ONLY: CPT | Mod: 93
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
## 2024-08-25 ENCOUNTER — HEALTH MAINTENANCE LETTER (OUTPATIENT)
Age: 29
End: 2024-08-25

## 2024-08-27 ENCOUNTER — VIRTUAL VISIT (OUTPATIENT)
Dept: MIDWIFE SERVICES | Facility: CLINIC | Age: 29
End: 2024-08-27
Payer: COMMERCIAL

## 2024-08-27 ENCOUNTER — ANCILLARY PROCEDURE (OUTPATIENT)
Dept: ULTRASOUND IMAGING | Facility: CLINIC | Age: 29
End: 2024-08-27
Attending: OBSTETRICS & GYNECOLOGY
Payer: COMMERCIAL

## 2024-08-27 DIAGNOSIS — Z34.90 ENCOUNTER FOR SUPERVISION OF NORMAL PREGNANCY: ICD-10-CM

## 2024-08-27 DIAGNOSIS — Z34.90 INTRAUTERINE PREGNANCY: Primary | ICD-10-CM

## 2024-08-27 PROCEDURE — 76801 OB US < 14 WKS SINGLE FETUS: CPT

## 2024-08-27 PROCEDURE — 99207 PR NO BILLABLE SERVICE THIS VISIT: CPT | Mod: 93 | Performed by: ADVANCED PRACTICE MIDWIFE

## 2024-08-27 PROCEDURE — 76801 OB US < 14 WKS SINGLE FETUS: CPT | Mod: 26 | Performed by: OBSTETRICS & GYNECOLOGY

## 2024-08-27 PROCEDURE — 76817 TRANSVAGINAL US OBSTETRIC: CPT | Mod: 26 | Performed by: OBSTETRICS & GYNECOLOGY

## 2024-08-27 NOTE — PROGRESS NOTES
Regina is a 29 year old who is being evaluated via a billable telephone visit.    What phone number would you like to be contacted at? [phone]   How would you like to obtain your AVS? Randeehart  Originating Location (pt. Location): Home  Distant Location (provider location):  On-site    Telephone call with Regina to review ultrasound. She has known LMP but only had one period between weaning toddler and conceiving and has historically had irregular periods. Ultrasound today measures 7w1d with AMANUEL 4/14/25. Will use this for dating. No other questions today. RTC for NOB appointment.          Phone call duration: 6 minutes  Signed Electronically by: JORGE Castillo CNM

## 2024-09-12 ENCOUNTER — TELEPHONE (OUTPATIENT)
Dept: FAMILY MEDICINE | Facility: CLINIC | Age: 29
End: 2024-09-12
Payer: COMMERCIAL

## 2024-09-12 NOTE — TELEPHONE ENCOUNTER
Patient Quality Outreach    Patient is due for the following:   Cervical Cancer Screening - PAP Needed    Next Steps:   Patient is currently pregnant    Type of outreach:    Chart review performed, no outreach needed.            Jo Ann Guzman MA

## 2024-09-25 ENCOUNTER — TRANSFERRED RECORDS (OUTPATIENT)
Dept: HEALTH INFORMATION MANAGEMENT | Facility: CLINIC | Age: 29
End: 2024-09-25

## 2024-09-25 ENCOUNTER — PRENATAL OFFICE VISIT (OUTPATIENT)
Dept: OBGYN | Facility: CLINIC | Age: 29
End: 2024-09-25
Payer: COMMERCIAL

## 2024-09-25 ENCOUNTER — TRANSCRIBE ORDERS (OUTPATIENT)
Dept: MATERNAL FETAL MEDICINE | Facility: CLINIC | Age: 29
End: 2024-09-25

## 2024-09-25 VITALS
DIASTOLIC BLOOD PRESSURE: 69 MMHG | SYSTOLIC BLOOD PRESSURE: 120 MMHG | HEART RATE: 90 BPM | WEIGHT: 138.5 LBS | TEMPERATURE: 97.9 F | HEIGHT: 64 IN | BODY MASS INDEX: 23.64 KG/M2 | OXYGEN SATURATION: 100 %

## 2024-09-25 DIAGNOSIS — Z34.81 ENCOUNTER FOR SUPERVISION OF OTHER NORMAL PREGNANCY IN FIRST TRIMESTER: Primary | ICD-10-CM

## 2024-09-25 DIAGNOSIS — Z23 HIGH PRIORITY FOR 2019-NCOV VACCINE: ICD-10-CM

## 2024-09-25 DIAGNOSIS — Z23 NEED FOR PROPHYLACTIC VACCINATION AND INOCULATION AGAINST INFLUENZA: ICD-10-CM

## 2024-09-25 DIAGNOSIS — Z12.4 PAP SMEAR FOR CERVICAL CANCER SCREENING: ICD-10-CM

## 2024-09-25 DIAGNOSIS — Z23 COVID-19 VACCINE ADMINISTERED: ICD-10-CM

## 2024-09-25 DIAGNOSIS — O26.90 PREGNANCY RELATED CONDITION, ANTEPARTUM: Primary | ICD-10-CM

## 2024-09-25 DIAGNOSIS — Z23 NEED FOR IMMUNIZATION AGAINST INFLUENZA: ICD-10-CM

## 2024-09-25 DIAGNOSIS — O34.219 PREGNANCY WITH HISTORY OF CESAREAN SECTION, ANTEPARTUM: ICD-10-CM

## 2024-09-25 LAB
ALBUMIN UR-MCNC: NEGATIVE MG/DL
APPEARANCE UR: CLEAR
BILIRUB UR QL STRIP: NEGATIVE
COLOR UR AUTO: YELLOW
ERYTHROCYTE [DISTWIDTH] IN BLOOD BY AUTOMATED COUNT: 13 % (ref 10–15)
GLUCOSE UR STRIP-MCNC: NEGATIVE MG/DL
HCT VFR BLD AUTO: 33 % (ref 35–47)
HCV AB SERPL QL IA: NONREACTIVE
HGB BLD-MCNC: 11.3 G/DL (ref 11.7–15.7)
HGB UR QL STRIP: ABNORMAL
HIV 1+2 AB+HIV1 P24 AG SERPL QL IA: NONREACTIVE
KETONES UR STRIP-MCNC: NEGATIVE MG/DL
LEUKOCYTE ESTERASE UR QL STRIP: NEGATIVE
MCH RBC QN AUTO: 29.3 PG (ref 26.5–33)
MCHC RBC AUTO-ENTMCNC: 34.2 G/DL (ref 31.5–36.5)
MCV RBC AUTO: 86 FL (ref 78–100)
NITRATE UR QL: NEGATIVE
PH UR STRIP: 6 [PH] (ref 5–7)
PLATELET # BLD AUTO: 296 10E3/UL (ref 150–450)
RBC # BLD AUTO: 3.86 10E6/UL (ref 3.8–5.2)
SP GR UR STRIP: <=1.005 (ref 1–1.03)
UROBILINOGEN UR STRIP-ACNC: 0.2 E.U./DL
WBC # BLD AUTO: 11.2 10E3/UL (ref 4–11)

## 2024-09-25 PROCEDURE — 86803 HEPATITIS C AB TEST: CPT | Performed by: OBSTETRICS & GYNECOLOGY

## 2024-09-25 PROCEDURE — 86780 TREPONEMA PALLIDUM: CPT | Performed by: OBSTETRICS & GYNECOLOGY

## 2024-09-25 PROCEDURE — 91320 SARSCV2 VAC 30MCG TRS-SUC IM: CPT | Performed by: OBSTETRICS & GYNECOLOGY

## 2024-09-25 PROCEDURE — 87340 HEPATITIS B SURFACE AG IA: CPT | Performed by: OBSTETRICS & GYNECOLOGY

## 2024-09-25 PROCEDURE — 99459 PELVIC EXAMINATION: CPT | Performed by: OBSTETRICS & GYNECOLOGY

## 2024-09-25 PROCEDURE — G0145 SCR C/V CYTO,THINLAYER,RESCR: HCPCS | Performed by: OBSTETRICS & GYNECOLOGY

## 2024-09-25 PROCEDURE — 87389 HIV-1 AG W/HIV-1&-2 AB AG IA: CPT | Performed by: OBSTETRICS & GYNECOLOGY

## 2024-09-25 PROCEDURE — 90480 ADMN SARSCOV2 VAC 1/ONLY CMP: CPT | Performed by: OBSTETRICS & GYNECOLOGY

## 2024-09-25 PROCEDURE — 36415 COLL VENOUS BLD VENIPUNCTURE: CPT | Performed by: OBSTETRICS & GYNECOLOGY

## 2024-09-25 PROCEDURE — 90471 IMMUNIZATION ADMIN: CPT | Performed by: OBSTETRICS & GYNECOLOGY

## 2024-09-25 PROCEDURE — 99213 OFFICE O/P EST LOW 20 MIN: CPT | Mod: 25 | Performed by: OBSTETRICS & GYNECOLOGY

## 2024-09-25 PROCEDURE — 85027 COMPLETE CBC AUTOMATED: CPT | Performed by: OBSTETRICS & GYNECOLOGY

## 2024-09-25 PROCEDURE — 86762 RUBELLA ANTIBODY: CPT | Performed by: OBSTETRICS & GYNECOLOGY

## 2024-09-25 PROCEDURE — 90656 IIV3 VACC NO PRSV 0.5 ML IM: CPT | Performed by: OBSTETRICS & GYNECOLOGY

## 2024-09-25 PROCEDURE — 81003 URINALYSIS AUTO W/O SCOPE: CPT | Performed by: OBSTETRICS & GYNECOLOGY

## 2024-09-25 PROCEDURE — 87086 URINE CULTURE/COLONY COUNT: CPT | Performed by: OBSTETRICS & GYNECOLOGY
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
## 2024-09-26 ENCOUNTER — TELEPHONE (OUTPATIENT)
Dept: NURSING | Facility: CLINIC | Age: 29
End: 2024-09-26
Payer: COMMERCIAL

## 2024-09-26 LAB
BACTERIA UR CULT: NORMAL
HBV SURFACE AG SERPL QL IA: NONREACTIVE
RUBV IGG SERPL QL IA: 1.83 INDEX
RUBV IGG SERPL QL IA: POSITIVE
T PALLIDUM AB SER QL: NONREACTIVE

## 2024-09-26 NOTE — TELEPHONE ENCOUNTER
Received a call from blood bank that pt's type and screen needed to be cancelled due to lack of initials on the vial.  Message routed to the lab team that the pt will need to be informed.  Provider notified as well.

## 2024-09-27 ENCOUNTER — MYC MEDICAL ADVICE (OUTPATIENT)
Dept: FAMILY MEDICINE | Facility: CLINIC | Age: 29
End: 2024-09-27
Payer: COMMERCIAL

## 2024-09-30 LAB
BKR LAB AP GYN ADEQUACY: NORMAL
BKR LAB AP GYN INTERPRETATION: NORMAL
BKR LAB AP HPV REFLEX: NORMAL
BKR LAB AP PREVIOUS ABNORMAL: NORMAL
PATH REPORT.COMMENTS IMP SPEC: NORMAL
PATH REPORT.COMMENTS IMP SPEC: NORMAL
PATH REPORT.RELEVANT HX SPEC: NORMAL

## 2024-10-02 ENCOUNTER — PRE VISIT (OUTPATIENT)
Dept: MATERNAL FETAL MEDICINE | Facility: CLINIC | Age: 29
End: 2024-10-02
Payer: COMMERCIAL

## 2024-10-07 ENCOUNTER — OFFICE VISIT (OUTPATIENT)
Dept: MATERNAL FETAL MEDICINE | Facility: CLINIC | Age: 29
End: 2024-10-07
Attending: OBSTETRICS & GYNECOLOGY
Payer: COMMERCIAL

## 2024-10-07 ENCOUNTER — LAB (OUTPATIENT)
Dept: LAB | Facility: CLINIC | Age: 29
End: 2024-10-07
Attending: OBSTETRICS & GYNECOLOGY
Payer: COMMERCIAL

## 2024-10-07 ENCOUNTER — MEDICAL CORRESPONDENCE (OUTPATIENT)
Dept: HEALTH INFORMATION MANAGEMENT | Facility: CLINIC | Age: 29
End: 2024-10-07

## 2024-10-07 ENCOUNTER — HOSPITAL ENCOUNTER (OUTPATIENT)
Dept: ULTRASOUND IMAGING | Facility: CLINIC | Age: 29
Discharge: HOME | End: 2024-10-07
Attending: OBSTETRICS & GYNECOLOGY
Payer: COMMERCIAL

## 2024-10-07 DIAGNOSIS — Z36.82 ENCOUNTER FOR (NT) NUCHAL TRANSLUCENCY SCAN: Primary | ICD-10-CM

## 2024-10-07 DIAGNOSIS — Z36.0 ENCOUNTER FOR ANTENATAL SCREENING FOR CHROMOSOMAL ANOMALIES: ICD-10-CM

## 2024-10-07 DIAGNOSIS — O26.90 PREGNANCY RELATED CONDITION, ANTEPARTUM: ICD-10-CM

## 2024-10-07 PROCEDURE — 76813 OB US NUCHAL MEAS 1 GEST: CPT

## 2024-10-07 PROCEDURE — 96040 HC GENETIC COUNSELING, EACH 30 MINUTES: CPT

## 2024-10-07 PROCEDURE — 36415 COLL VENOUS BLD VENIPUNCTURE: CPT

## 2024-10-07 PROCEDURE — 76813 OB US NUCHAL MEAS 1 GEST: CPT | Mod: 26 | Performed by: STUDENT IN AN ORGANIZED HEALTH CARE EDUCATION/TRAINING PROGRAM

## 2024-10-07 NOTE — PROGRESS NOTES
[facility]  Genetic Counseling Consult    Patient:  Regina Mays YOB: 1995   Date of Service:  10/07/24   MRN: [id number]    Regina was seen at the [facility] for genetic consultation. The indication for genetic counseling is desire to discuss options for genetic screening and diagnostics. The patient was accompanied to this visit by their partner, Carloz.    IMPRESSION/ PLAN   1. Regina has not had genetic screening in this pregnancy but elected to have screening today.     2. During today's [facility] visit, Regina had a blood draw for expanded non-invasive prenatal testing (also called NIPT, NIPS, or cell-free DNA) through [facility] The expanded NIPT screens for trisomy 21, 18, and 13 and select microdeletion syndromes, including 22q11.2 deletion syndrome. The patient opted to screen for sex chromosome aneuploidies, but declines reporting of fetal sex. Results are expected in 7-14 days. The patient will be called with results and if they do not answer they requested a detailed message with results on their voicemail, NOT including the predicted fetal sex information. Instead, they would like the sex information to be shared with their friend, Loli Manning. A consent to communicate was signed today. Patient was informed that results, including fetal sex, will be available in [place].    3. Regina had a nuchal translucency ultrasound today. Please see the ultrasound report for further details.    4. Further recommendations include a fetal anatomy ultrasound around 18-20 weeks, which will most likely be completed with their primary OB provider.    PREGNANCY HISTORY   /Parity:       Regina's pregnancy history is significant for:   Term  - Thick NT - CVS completed genetic testing came back negative  AB     CURRENT PREGNANCY   Current Age: 29 year old     Age at Delivery: 29 year old    AMANUEL:  "4/14/2025, by Ultrasound                                     Gestational Age: 13w0d    This pregnancy is a single gestation.     This pregnancy was conceived spontaneously.    Regina reports no bleeding, complications, illnesses, fever or exposure concerns with this pregnancy.     MEDICAL HISTORY   Regina s reported medical history is not expected to impact pregnancy management or risks to fetal development.        FAMILY HISTORY   A three-generation pedigree was obtained previously by a genetic counselor on 11/3/2022 and is scanned under the \"Media\" tab in Epic. There were no significant updates provided today. Please see the original documentation from 11/3/2022 for more information.      Regina reports their son who is 17 months is healthy.        RISK ASSESSMENT FOR CHROMOSOME CONDITIONS   We explained that the risk for fetal chromosome abnormalities increases with maternal age. We discussed specific features of common chromosome abnormalities, including Down syndrome, trisomy 13, trisomy 18, and sex chromosome trisomies.    At age 29 at midtrimester, the risk to have a baby with Down syndrome is 1 in 760.   At age 29 at midtrimester, the risk to have a baby with any chromosome abnormality is 1 in 380.     Regina has not had genetic screening in this pregnancy but elected to have screening today.      RISK ASSESSMENT FOR INHERITED CONDITIONS   We discussed that every pregnancy has a chance to have an inherited single-gene condition, even when there is no family history of that condition. In fact, approximately 90% of couples at an increased reproductive risk for an inherited condition have no family history of that condition. The average person may be a carrier for 5-10 different genetic variants that can increase the chance for their pregnancies to have that condition. We discussed autosomal recessive conditions and X-linked conditions. Autosomal recessive conditions happen when a mutation has been inherited " from the egg and sperm and include conditions like cystic fibrosis, thalassemia, hearing loss, spinal muscular atrophy, and more. X-linked conditions happen when a mutation has been inherited from the egg and include conditions like fragile X syndrome.     We reviewed that when both biological parents carry a harmful genetic change in a gene associated with autosomal recessive inheritance, each of their pregnancies has a 1 in 4 (25%) chance to be affected by that condition. With x-linked conditions, the specific risk generally depends on the chromosomal sex of the fetus, with XY individuals (generally male) being most severely affected.     [place] screening was reviewed. About [place] Screening    The patient has not had carrier screening previously. The patient was not certain about whether to pursue carrier screening today. They will contact us if they would like to pursue screening. See below for the more detailed information we discussed.    We discussed that expanded carrier screening is designed to identify carrier status for conditions that are primarily childhood or adolescent onset. Expanded carrier screening does not evaluate for adult-onset conditions such as hereditary cancer syndromes, dementia/ Alzheimer's disease, or cardiovascular disease risk factors. Additionally, expanded carrier screening is not comprehensive for all known genetic diseases or inherited conditions. It will not intentionally screen for autosomal dominant conditions. This is a screening test, and residual carrier status risk figures will be provided to the patient after results become available. Carrier screening is not meant to diagnose the patient with a condition, and generally carriers are asymptomatic. However, certain genes may confer increased risks for various health concerns in carriers (including, but not limited to: ZENIA, DMD, FMR1).    We reviewed that there is a law in place, the Genetic Information Nondiscrimination  Act (RENEE), that protects patients from discrimination by health insurance companies and employers based on their genetic information. R[place]EE does not protect against discrimination by life insurance companies or disability insurance.    GENETIC TESTING OPTIONS   Genetic testing during a pregnancy includes screening and diagnostic procedures.      Screening tests are non-invasive which means no risk to the pregnancy and includes ultrasounds and blood work. The benefits and limitations of screening were reviewed. Screening tests provide a risk assessment (chance) specific to the pregnancy for certain fetal chromosome abnormalities but cannot definitively diagnose or exclude a fetal chromosome abnormality. Follow-up genetic counseling and consideration of diagnostic testing is recommended with any abnormal screening result. Diagnostic testing during a pregnancy is more certain and can test for more conditions. However, the tests do have a risk of miscarriage that requires careful consideration. These tests can detect fetal chromosome abnormalities with greater than 99% certainty. Results can be compromised by maternal cell contamination or mosaicism and are limited by the resolution of current genetic testing technology.     There is no screening or diagnostic test that detects all forms of birth defects or intellectual disability.     We discussed the following screening options:   Non-invasive prenatal testing (NIPT)  Also called cell-free DNA screening because it detect chromosome fragments from the placenta in the pregnant person's blood.  Can be done any time after 10 weeks gestation.  Screens for trisomy 21, trisomy 18, trisomy 13, and sex chromosome aneuploidies. ACMG also recommends consideration of screening for 22q11.2 microdeletion syndrome.  Does not screen for all known chromosomal conditions.  Even with negative results, a residual risk for screened conditions remains.  Cannot screen for open neural tube  defects, maternal serum AFP after 15 weeks is recommended    Carrier screening  Risk assessment for certain autosomal recessive and x-linked conditions. These conditions are generally infantile- or childhood-onset conditions.   Can be done any time during the pregnancy or prior to pregnancy.  Can screen for over 500 different genetic conditions.  Is not intended to diagnosis a condition in the carrier parent.    Even with negative results, a residual risk for screened conditions remains.      We discussed the following ultrasound options:  Nuchal translucency (NT) ultrasound  Ultrasound between [id number] that includes nuchal translucency measurement and nasal bone assessments  Nuchal translucency refers to the space at the back of the neck where fluid builds up. All babies at this stage have fluid and there is only concern if there is too much fluid  Nasal bone refers to the small bone in the nose. There is concern for conditions like Down syndrome if the bone cannot be seen at all  This ultrasound can be done as part of first trimester screening, at the same time as another screen (NIPT), at the same time as a CVS, or if the patients does not want genetic screening.  Markers on ultrasound detects about 70% of pregnancies with aneuploidy  Abnormalities on NT ultrasound can also increase the risk for a birth defect, like a heart defect    We discussed the following diagnostic options:   Chorionic villus sampling (CVS)  Invasive diagnostic procedure done between 10w0d and 13w6d  The procedure collects a small sample from the placenta for the purpose of chromosomal testing and/or other genetic testing  Diagnostic result; more than 99% sensitivity for fetal chromosome abnormalities  Cannot screen for open neural tube defects, maternal serum AFP after 15 weeks is recommended  Amniocentesis  Invasive diagnostic procedure done after 15 weeks gestation  The procedure collects a small sample of amniotic fluid for the  purpose of chromosomal testing and/or other genetic testing  Diagnostic result; more than 99% sensitivity for fetal chromosome abnormalities  Testing for AFP in the amniotic fluid can test for open neural tube defects      It was a pleasure to be involved with Regina s care. Face-to-face time of the meeting was  18  minutes.    ABIMBOLA OSEGUERA MS, [facility]  Genetic Counselor  [facility]  Maternal Fetal Medicine  Office: [phone]   [facility]: [phone]   Fax: [phone]  [facility]

## 2024-10-07 NOTE — NURSING NOTE
Patient reports no pain, no contractions, leaking of fluid, or bleeding.  SBAR given to BRADFORD POLLOCK, see their note in Epic.

## 2024-10-07 NOTE — PROGRESS NOTES
The patient was seen for an ultrasound in the [facility]etal Medicine Center today.  For a detailed report of the ultrasound examination, please see the ultrasound report which can be found under the imaging tab.    If you have questions regarding today's evaluation or if we can be of further service, please contact the Maternal-Fetal Medicine Center.    Suad Arambula MD  , OB/GYN  Maternal-Fetal Medicine

## 2024-10-22 ENCOUNTER — TELEPHONE (OUTPATIENT)
Dept: MATERNAL FETAL MEDICINE | Facility: CLINIC | Age: 29
End: 2024-10-22
Payer: COMMERCIAL

## 2024-10-22 LAB — SCANNED LAB RESULT: NORMAL

## 2024-10-22 NOTE — TELEPHONE ENCOUNTER
October 22, 2024    I spoke with Regina regarding her Prequel (NIPT) results through [facility]. .     Results indicate NO ANEUPLOIDY DETECTED for chromosomes 21, 18, 13, or sex chromosomes. Results also indicate NO MICRODELETION DETECTED for the 22q11.2 or other select microdeletions (15q11.2, 1p36, 4p, and 5p).    This puts her current pregnancy at low risk for Down syndrome, trisomy 18, trisomy 13 and sex chromosome abnormalities. This test is reported to have the following sensitivities: Down syndrome: 99.7%, trisomy 18: 97.9%, trisomy 13: 99.0%, monosomy X: 95.8%, XX: 97.6%, and XY: 99.1%. Although these results are reassuring, this does not replace a standard chromosome analysis from a chorionic villus sampling or amniocentesis. The results also reduce, but do not eliminate, the risk for 22q11.2 deletion syndrome and other select microdeletions.    MSAFP is the appropriate second trimester screening test for open neural tube defects; the maternal quad screen is not recommended.    Her results are available in her Epic chart for her primary OB to review.    A printed copy of the results was given to Loli Manning RN per the established plan. A consent to communicate form was previously signed.    ABIMBOLA OSEGUERA MS, [facility]  Genetic Counselor  [facility]  Maternal Fetal Medicine  Office: [phone]   [facility]: [phone]   Fax: [phone]  [facility]

## 2024-10-29 ENCOUNTER — PRENATAL OFFICE VISIT (OUTPATIENT)
Dept: MIDWIFE SERVICES | Facility: CLINIC | Age: 29
End: 2024-10-29
Payer: COMMERCIAL

## 2024-10-29 VITALS
BODY MASS INDEX: 24.92 KG/M2 | DIASTOLIC BLOOD PRESSURE: 66 MMHG | OXYGEN SATURATION: 99 % | SYSTOLIC BLOOD PRESSURE: 112 MMHG | WEIGHT: 145.2 LBS | HEART RATE: 90 BPM

## 2024-10-29 DIAGNOSIS — Z98.891 HISTORY OF C-SECTION: ICD-10-CM

## 2024-10-29 DIAGNOSIS — Z34.92 PRENATAL CARE IN SECOND TRIMESTER: Primary | ICD-10-CM

## 2024-10-29 PROCEDURE — 99207 PR PRENATAL VISIT: CPT | Performed by: ADVANCED PRACTICE MIDWIFE
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
## 2024-11-26 ENCOUNTER — PRENATAL OFFICE VISIT (OUTPATIENT)
Dept: MIDWIFE SERVICES | Facility: CLINIC | Age: 29
End: 2024-11-26
Attending: ADVANCED PRACTICE MIDWIFE
Payer: COMMERCIAL

## 2024-11-26 ENCOUNTER — ANCILLARY PROCEDURE (OUTPATIENT)
Dept: ULTRASOUND IMAGING | Facility: CLINIC | Age: 29
End: 2024-11-26
Attending: ADVANCED PRACTICE MIDWIFE
Payer: COMMERCIAL

## 2024-11-26 VITALS
BODY MASS INDEX: 25.8 KG/M2 | SYSTOLIC BLOOD PRESSURE: 111 MMHG | DIASTOLIC BLOOD PRESSURE: 69 MMHG | WEIGHT: 150.3 LBS | HEART RATE: 72 BPM

## 2024-11-26 DIAGNOSIS — O34.219 HISTORY OF CESAREAN DELIVERY, CURRENTLY PREGNANT: Primary | ICD-10-CM

## 2024-11-26 DIAGNOSIS — Z34.92 PRENATAL CARE IN SECOND TRIMESTER: ICD-10-CM

## 2024-11-26 DIAGNOSIS — G43.711 INTRACTABLE CHRONIC MIGRAINE WITHOUT AURA AND WITH STATUS MIGRAINOSUS: ICD-10-CM

## 2024-11-26 PROCEDURE — 99207 PR PRENATAL VISIT: CPT | Performed by: ADVANCED PRACTICE MIDWIFE

## 2024-11-26 PROCEDURE — 76805 OB US >/= 14 WKS SNGL FETUS: CPT | Performed by: OBSTETRICS & GYNECOLOGY

## 2024-11-26 RX ORDER — TRIAMCINOLONE ACETONIDE 5 MG/G
OINTMENT TOPICAL
COMMUNITY
Start: 2024-09-27

## 2024-11-26 NOTE — PROGRESS NOTES
20w1d  Regina is here with Carloz after anatomy US today. Preliminary results are normal. Starting to feel fetal movement. Denies leaking of fluid, vaginal bleeding, regular uterine contractions, headache, visual changes, or other concerns. Having migraine headaches most days. They come and go and are relieved a little with Tylenol but not all the way. Neurology referral placed. Reviewed s/sx of preeclampsia. She is normotensive and no other s/sx.     Discussed hx of CS and TOLAC vs repeat CS. Discussed risks and benefits of each path including risk of uterine rupture with TOLAC. She is still considering risks and benefits. Encouraged to schedule appointment with OBGYN team to review operative note and then can sign consent form with either team.     RTC in 4 weeks for GCT, CBC, Treponema.    JORGE Castillo CNM

## 2024-12-16 NOTE — TELEPHONE ENCOUNTER
RECORDS RECEIVED FROM: [facility]   REASON FOR VISIT: Migraine   PROVIDER: Eun Del Toro NP   DATE OF APPT: 2025   NOTES (FOR ALL VISITS) STATUS DETAILS   OFFICE NOTE from referring provider Internal [facility]:  24 - MIDWIFE OV with Lina Duarte CNM   OFFICE NOTE from other specialist Internal / Received [facility]:  9/10/19 - NEURO OV with Dr. bAhijit Mcleanview - [place]:  10/2/18 - ENT OV with Dr. Brian Zamarripa - [place]/24/15 - PCC OV with Gardenia White NP  12 - NEURO OV with Dr. Jimenez   DISCHARGE SUMMARY from hospital N/A    DISCHARGE REPORT from the ER [facility]:  18 - ED OV with Dr. Milner   OPERATIVE REPORT N/A    EMG N/A    EEG N/A    MEDICATION LIST Internal    IMAGING  (FOR ALL VISITS)     LUMBAR PUNCTURE N/A    BERTIN SCAN (MOVEMENT) N/A    ULTRASOUND (CAROTID BILAT) *VASCULAR* N/A    MRI (HEAD, NECK, SPINE) In process / Internal [facility]:  16 - MRI Head    [facility]:  11 - MRI Brain   CT (HEAD, NECK, SPINE) N/A      Records Requested    Facility  [facility]  Fax: [phone]   Outcome * 24 2:30 PM Faxed request to [facility] for records to be faxed to the clinic. - Pauline    * 24 10:47 AM Records received from [facility] and sent to [facility] to be scanned into the chart.  Faxed request for images to be pushed. - Pauline

## 2024-12-25 LAB
ABO + RH BLD: NORMAL
BLD GP AB SCN SERPL QL: NEGATIVE
SPECIMEN EXP DATE BLD: NORMAL

## 2024-12-26 ENCOUNTER — PRENATAL OFFICE VISIT (OUTPATIENT)
Dept: OBGYN | Facility: CLINIC | Age: 29
End: 2024-12-26
Payer: COMMERCIAL

## 2024-12-26 VITALS
BODY MASS INDEX: 26.97 KG/M2 | SYSTOLIC BLOOD PRESSURE: 112 MMHG | OXYGEN SATURATION: 99 % | DIASTOLIC BLOOD PRESSURE: 72 MMHG | WEIGHT: 157.1 LBS | HEART RATE: 106 BPM

## 2024-12-26 DIAGNOSIS — Z34.81 ENCOUNTER FOR SUPERVISION OF OTHER NORMAL PREGNANCY IN FIRST TRIMESTER: Primary | ICD-10-CM

## 2024-12-26 DIAGNOSIS — Z67.91 RH NEGATIVE STATE IN ANTEPARTUM PERIOD: ICD-10-CM

## 2024-12-26 DIAGNOSIS — G43.009 MIGRAINE WITHOUT AURA AND WITHOUT STATUS MIGRAINOSUS, NOT INTRACTABLE: ICD-10-CM

## 2024-12-26 DIAGNOSIS — R10.84 ABDOMINAL PAIN, GENERALIZED: ICD-10-CM

## 2024-12-26 DIAGNOSIS — O26.899 RH NEGATIVE STATE IN ANTEPARTUM PERIOD: ICD-10-CM

## 2024-12-26 DIAGNOSIS — Z98.891 HISTORY OF CESAREAN SECTION: ICD-10-CM

## 2024-12-26 LAB
ERYTHROCYTE [DISTWIDTH] IN BLOOD BY AUTOMATED COUNT: 13 % (ref 10–15)
FERRITIN SERPL-MCNC: 16 NG/ML (ref 6–175)
GLUCOSE 1H P 50 G GLC PO SERPL-MCNC: 92 MG/DL (ref 70–129)
HCT VFR BLD AUTO: 33 % (ref 35–47)
HGB BLD-MCNC: 10.9 G/DL (ref 11.7–15.7)
HOLD SPECIMEN: NORMAL
IRON BINDING CAPACITY (ROCHE): 438 UG/DL (ref 240–430)
IRON SATN MFR SERPL: 31 % (ref 15–46)
IRON SERPL-MCNC: 136 UG/DL (ref 37–145)
MCH RBC QN AUTO: 29 PG (ref 26.5–33)
MCHC RBC AUTO-ENTMCNC: 33 G/DL (ref 31.5–36.5)
MCV RBC AUTO: 88 FL (ref 78–100)
PLATELET # BLD AUTO: 294 10E3/UL (ref 150–450)
RBC # BLD AUTO: 3.76 10E6/UL (ref 3.8–5.2)
WBC # BLD AUTO: 10.2 10E3/UL (ref 4–11)

## 2024-12-26 ASSESSMENT — PATIENT HEALTH QUESTIONNAIRE - PHQ9
SUM OF ALL RESPONSES TO PHQ QUESTIONS 1-9: 2
10. IF YOU CHECKED OFF ANY PROBLEMS, HOW DIFFICULT HAVE THESE PROBLEMS MADE IT FOR YOU TO DO YOUR WORK, TAKE CARE OF THINGS AT HOME, OR GET ALONG WITH OTHER PEOPLE: NOT DIFFICULT AT ALL
SUM OF ALL RESPONSES TO PHQ QUESTIONS 1-9: 2
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
## 2025-01-21 ENCOUNTER — PRENATAL OFFICE VISIT (OUTPATIENT)
Dept: MIDWIFE SERVICES | Facility: CLINIC | Age: 30
End: 2025-01-21
Payer: COMMERCIAL

## 2025-01-21 VITALS
BODY MASS INDEX: 27.98 KG/M2 | WEIGHT: 163 LBS | SYSTOLIC BLOOD PRESSURE: 113 MMHG | DIASTOLIC BLOOD PRESSURE: 70 MMHG | OXYGEN SATURATION: 97 % | HEART RATE: 85 BPM

## 2025-01-21 DIAGNOSIS — O26.892 RH NEGATIVE STATE IN ANTEPARTUM PERIOD, SECOND TRIMESTER: ICD-10-CM

## 2025-01-21 DIAGNOSIS — R12 HEARTBURN DURING PREGNANCY IN THIRD TRIMESTER: ICD-10-CM

## 2025-01-21 DIAGNOSIS — Z67.91 RH NEGATIVE STATE IN ANTEPARTUM PERIOD, SECOND TRIMESTER: ICD-10-CM

## 2025-01-21 DIAGNOSIS — Z34.83 ENCOUNTER FOR SUPERVISION OF OTHER NORMAL PREGNANCY IN THIRD TRIMESTER: Primary | ICD-10-CM

## 2025-01-21 DIAGNOSIS — O26.893 HEARTBURN DURING PREGNANCY IN THIRD TRIMESTER: ICD-10-CM

## 2025-01-21 DIAGNOSIS — O99.013 ANEMIA AFFECTING PREGNANCY IN THIRD TRIMESTER: ICD-10-CM

## 2025-01-21 PROCEDURE — 90471 IMMUNIZATION ADMIN: CPT | Performed by: ADVANCED PRACTICE MIDWIFE

## 2025-01-21 PROCEDURE — 96372 THER/PROPH/DIAG INJ SC/IM: CPT | Performed by: ADVANCED PRACTICE MIDWIFE

## 2025-01-21 PROCEDURE — 90715 TDAP VACCINE 7 YRS/> IM: CPT | Performed by: ADVANCED PRACTICE MIDWIFE

## 2025-01-21 PROCEDURE — 99213 OFFICE O/P EST LOW 20 MIN: CPT | Mod: 25 | Performed by: ADVANCED PRACTICE MIDWIFE

## 2025-01-21 PROCEDURE — 99207 PR PRENATAL VISIT: CPT | Performed by: ADVANCED PRACTICE MIDWIFE

## 2025-01-21 RX ORDER — MULTIVIT WITH MINERALS/LUTEIN
250 TABLET ORAL DAILY
Qty: 90 TABLET | Refills: 1 | Status: SHIPPED | OUTPATIENT
Start: 2025-01-21

## 2025-01-21 RX ORDER — CYANOCOBALAMIN (VITAMIN B-12) 500 MCG
1000 TABLET ORAL DAILY
Qty: 180 TABLET | Refills: 1 | Status: SHIPPED | OUTPATIENT
Start: 2025-01-21

## 2025-01-21 NOTE — PROGRESS NOTES
28w1d  Regina is here with Carloz for prenatal visit. Reports good fetal movement. Denies leaking of fluid, vaginal bleeding, regular uterine contractions, headache, visual changes, or other concerns. Accepts TDAP and Rhogam today. Feeling lightheaded and noticing tachycardia intermittently. She is anemic, Hgb 10.9, but has not started iron. Discussed iron supplementation every other day, B12 and vit C daily. Also experiencing a lot of heartburn. Rx for omeprazole daily. No other questions or concerns today. RTC in 2 weeks.    JORGE Castillo CNM

## 2025-02-05 ENCOUNTER — PRENATAL OFFICE VISIT (OUTPATIENT)
Dept: MIDWIFE SERVICES | Facility: CLINIC | Age: 30
End: 2025-02-05
Payer: COMMERCIAL

## 2025-02-05 VITALS
HEART RATE: 103 BPM | OXYGEN SATURATION: 97 % | SYSTOLIC BLOOD PRESSURE: 121 MMHG | DIASTOLIC BLOOD PRESSURE: 66 MMHG | WEIGHT: 168.7 LBS | BODY MASS INDEX: 28.96 KG/M2

## 2025-02-05 DIAGNOSIS — Z34.80 SUPERVISION OF OTHER NORMAL PREGNANCY, ANTEPARTUM: Primary | ICD-10-CM

## 2025-02-05 NOTE — PROGRESS NOTES
30w2d  Regina is here today for a routine prenatal visit accompanied by her son, Nicole. Denies vaginal bleeding, loss of fluid, or regular contractions. Reports fetal movement appropriate for gestational age.  Reports omeprazole has been working very well for heartburn.  Taking iron supplement consistently. Has occasional episodes of feeling like heart is racing and feeling very fatigued. These episodes resolve after about 15 minutes with rest. Denies chest pain.  Return precautions provided. Return in 2 weeks or sooner as needed.

## 2025-02-18 ENCOUNTER — PRENATAL OFFICE VISIT (OUTPATIENT)
Dept: MIDWIFE SERVICES | Facility: CLINIC | Age: 30
End: 2025-02-18
Payer: COMMERCIAL

## 2025-02-18 VITALS
SYSTOLIC BLOOD PRESSURE: 120 MMHG | DIASTOLIC BLOOD PRESSURE: 74 MMHG | WEIGHT: 169.9 LBS | BODY MASS INDEX: 29.16 KG/M2 | HEART RATE: 101 BPM

## 2025-02-18 DIAGNOSIS — O99.013 ANEMIA AFFECTING PREGNANCY IN THIRD TRIMESTER: ICD-10-CM

## 2025-02-18 DIAGNOSIS — Z34.80 SUPERVISION OF OTHER NORMAL PREGNANCY, ANTEPARTUM: Primary | ICD-10-CM

## 2025-02-18 LAB
FERRITIN SERPL-MCNC: 23 NG/ML (ref 6–175)
HGB BLD-MCNC: 11.2 G/DL (ref 11.7–15.7)

## 2025-02-18 PROCEDURE — 36415 COLL VENOUS BLD VENIPUNCTURE: CPT | Performed by: ADVANCED PRACTICE MIDWIFE

## 2025-02-18 PROCEDURE — 82728 ASSAY OF FERRITIN: CPT | Performed by: ADVANCED PRACTICE MIDWIFE

## 2025-02-18 NOTE — PROGRESS NOTES
32w1d  Regina is here today for a routine prenatal visit. Here today with her  and son. Denies vaginal bleeding, loss of fluid, or regular contractions. Reports fetal movement appropriate for gestational age.  She has some low back pain which is improved with daily yoga practice and use of maternity support belt. Has some occasional pressure which feels like the baby is very low. Reviewed normal pelvic pressure vs signs of  labor and when to notify provider.  Anemia: Repeat hemoglobin and ferritin ordered today to reassess. Continues PO iron supplement.  Reviewed labor plans - plans to labor at home for as long as is comfortable, will be supported by her friend who is a former L&D RN. Had an epidural with her first labor and will consider epidural as needed.  Discussed possibility of hospital being on divert at time of delivery. Return precautions provided. Return in 2 weeks or sooner as needed.

## 2025-03-04 ENCOUNTER — PRENATAL OFFICE VISIT (OUTPATIENT)
Dept: MIDWIFE SERVICES | Facility: CLINIC | Age: 30
End: 2025-03-04
Payer: COMMERCIAL

## 2025-03-04 VITALS
WEIGHT: 172.5 LBS | OXYGEN SATURATION: 99 % | HEART RATE: 56 BPM | DIASTOLIC BLOOD PRESSURE: 68 MMHG | BODY MASS INDEX: 29.61 KG/M2 | SYSTOLIC BLOOD PRESSURE: 115 MMHG

## 2025-03-04 DIAGNOSIS — Z34.83 ENCOUNTER FOR SUPERVISION OF OTHER NORMAL PREGNANCY IN THIRD TRIMESTER: Primary | ICD-10-CM

## 2025-03-04 PROCEDURE — 0502F SUBSEQUENT PRENATAL CARE: CPT | Performed by: ADVANCED PRACTICE MIDWIFE

## 2025-03-04 PROCEDURE — 3078F DIAST BP <80 MM HG: CPT | Performed by: ADVANCED PRACTICE MIDWIFE

## 2025-03-04 PROCEDURE — 99207 PR PRENATAL VISIT: CPT | Performed by: ADVANCED PRACTICE MIDWIFE

## 2025-03-04 PROCEDURE — 3074F SYST BP LT 130 MM HG: CPT | Performed by: ADVANCED PRACTICE MIDWIFE

## 2025-03-04 NOTE — PROGRESS NOTES
34w1d  Regina is here with Carloz and their son for prenatal care. She is feeling well. Says this has been a good pregnancy. Baby is active, no bleeding, leaking of fluid, headaches or other concerns. Reinforced TOLAC understanding, and she feels well informed. Discussed that spontaneous labor is best, as our options for induction of labor are limited with history of uterine scar. Discussed GBS/hgb and BSUS at next visit at 36w.  Vasiliy Monroy CNM

## 2025-03-10 ENCOUNTER — TELEPHONE (OUTPATIENT)
Dept: NURSING | Facility: CLINIC | Age: 30
End: 2025-03-10
Payer: COMMERCIAL

## 2025-03-10 ENCOUNTER — HOSPITAL ENCOUNTER (OUTPATIENT)
Facility: CLINIC | Age: 30
Discharge: HOME | End: 2025-03-10
Attending: STUDENT IN AN ORGANIZED HEALTH CARE EDUCATION/TRAINING PROGRAM | Admitting: ADVANCED PRACTICE MIDWIFE
Payer: COMMERCIAL

## 2025-03-10 ENCOUNTER — APPOINTMENT (OUTPATIENT)
Dept: CT IMAGING | Facility: CLINIC | Age: 30
End: 2025-03-10
Attending: STUDENT IN AN ORGANIZED HEALTH CARE EDUCATION/TRAINING PROGRAM
Payer: COMMERCIAL

## 2025-03-10 VITALS
RESPIRATION RATE: 18 BRPM | HEART RATE: 78 BPM | HEIGHT: 64 IN | OXYGEN SATURATION: 98 % | DIASTOLIC BLOOD PRESSURE: 54 MMHG | WEIGHT: 170 LBS | SYSTOLIC BLOOD PRESSURE: 103 MMHG | TEMPERATURE: 97.7 F | BODY MASS INDEX: 29.02 KG/M2

## 2025-03-10 DIAGNOSIS — Z3A.35 35 WEEKS GESTATION OF PREGNANCY: ICD-10-CM

## 2025-03-10 DIAGNOSIS — R55 SYNCOPE AND COLLAPSE: ICD-10-CM

## 2025-03-10 DIAGNOSIS — Z34.93 THIRD TRIMESTER PREGNANCY: ICD-10-CM

## 2025-03-10 DIAGNOSIS — J21.9 BRONCHIOLITIS: ICD-10-CM

## 2025-03-10 DIAGNOSIS — O99.513 RESPIRATORY SYSTEM DISEASE COMPLICATING PREGNANCY IN THIRD TRIMESTER: ICD-10-CM

## 2025-03-10 DIAGNOSIS — O99.353 DISEASES OF THE NERVOUS SYSTEM COMPLICATING PREGNANCY, THIRD TRIMESTER: Primary | ICD-10-CM

## 2025-03-10 PROBLEM — Z36.89 ENCOUNTER FOR TRIAGE IN PREGNANT PATIENT: Status: ACTIVE | Noted: 2025-03-10

## 2025-03-10 LAB
ALBUMIN SERPL BCG-MCNC: 3.8 G/DL (ref 3.5–5.2)
ALBUMIN UR-MCNC: NEGATIVE MG/DL
ALP SERPL-CCNC: 81 U/L (ref 40–150)
ALT SERPL W P-5'-P-CCNC: 6 U/L (ref 0–50)
ANION GAP SERPL CALCULATED.3IONS-SCNC: 12 MMOL/L (ref 7–15)
APPEARANCE UR: CLEAR
APTT PPP: 27 SECONDS (ref 22–38)
AST SERPL W P-5'-P-CCNC: 11 U/L (ref 0–45)
ATRIAL RATE - MUSE: 90 BPM
BACTERIA #/AREA URNS HPF: ABNORMAL /HPF
BASOPHILS # BLD AUTO: 0 10E3/UL (ref 0–0.2)
BASOPHILS NFR BLD AUTO: 0 %
BILIRUB SERPL-MCNC: 0.2 MG/DL
BILIRUB UR QL STRIP: NEGATIVE
BUN SERPL-MCNC: 8.4 MG/DL (ref 6–20)
CALCIUM SERPL-MCNC: 9 MG/DL (ref 8.8–10.4)
CHLORIDE SERPL-SCNC: 101 MMOL/L (ref 98–107)
COLOR UR AUTO: ABNORMAL
CREAT SERPL-MCNC: 0.45 MG/DL (ref 0.51–0.95)
D DIMER PPP FEU-MCNC: 0.93 UG/ML FEU (ref 0–0.5)
DIASTOLIC BLOOD PRESSURE - MUSE: NORMAL MMHG
EGFRCR SERPLBLD CKD-EPI 2021: >90 ML/MIN/1.73M2
EOSINOPHIL # BLD AUTO: 0.1 10E3/UL (ref 0–0.7)
EOSINOPHIL NFR BLD AUTO: 0 %
ERYTHROCYTE [DISTWIDTH] IN BLOOD BY AUTOMATED COUNT: 13.3 % (ref 10–15)
FETAL RBC % LFV: 0 %
FETAL RBC (ML): 0 ML
FLUAV RNA SPEC QL NAA+PROBE: NEGATIVE
FLUBV RNA RESP QL NAA+PROBE: NEGATIVE
GLUCOSE SERPL-MCNC: 71 MG/DL (ref 70–99)
GLUCOSE UR STRIP-MCNC: NEGATIVE MG/DL
HCO3 SERPL-SCNC: 21 MMOL/L (ref 22–29)
HCT VFR BLD AUTO: 34 % (ref 35–47)
HGB BLD-MCNC: 11.8 G/DL (ref 11.7–15.7)
HGB UR QL STRIP: NEGATIVE
IF INDICATED RECOMMENDED DOSE OF RH IMMUNE GLOBULIN UG: 300 UG
IMM GRANULOCYTES # BLD: 0.1 10E3/UL
IMM GRANULOCYTES NFR BLD: 1 %
INR PPP: 0.98 (ref 0.85–1.15)
INTERPRETATION ECG - MUSE: NORMAL
KETONES UR STRIP-MCNC: NEGATIVE MG/DL
LEUKOCYTE ESTERASE UR QL STRIP: NEGATIVE
LYMPHOCYTES # BLD AUTO: 2.2 10E3/UL (ref 0.8–5.3)
LYMPHOCYTES NFR BLD AUTO: 17 %
MCH RBC QN AUTO: 30.1 PG (ref 26.5–33)
MCHC RBC AUTO-ENTMCNC: 34.7 G/DL (ref 31.5–36.5)
MCV RBC AUTO: 87 FL (ref 78–100)
MONOCYTES # BLD AUTO: 0.9 10E3/UL (ref 0–1.3)
MONOCYTES NFR BLD AUTO: 7 %
MUCOUS THREADS #/AREA URNS LPF: PRESENT /LPF
NEUTROPHILS # BLD AUTO: 9.1 10E3/UL (ref 1.6–8.3)
NEUTROPHILS NFR BLD AUTO: 74 %
NITRATE UR QL: NEGATIVE
NRBC # BLD AUTO: 0 10E3/UL
NRBC BLD AUTO-RTO: 0 /100
P AXIS - MUSE: 14 DEGREES
PH UR STRIP: 7 [PH] (ref 5–7)
PLATELET # BLD AUTO: 264 10E3/UL (ref 150–450)
POTASSIUM SERPL-SCNC: 3.8 MMOL/L (ref 3.4–5.3)
PR INTERVAL - MUSE: 148 MS
PROT SERPL-MCNC: 6.4 G/DL (ref 6.4–8.3)
QRS DURATION - MUSE: 72 MS
QT - MUSE: 358 MS
QTC - MUSE: 437 MS
R AXIS - MUSE: 8 DEGREES
RBC # BLD AUTO: 3.92 10E6/UL (ref 3.8–5.2)
RBC URINE: 1 /HPF
RSV RNA SPEC NAA+PROBE: NEGATIVE
SARS-COV-2 RNA RESP QL NAA+PROBE: NEGATIVE
SODIUM SERPL-SCNC: 134 MMOL/L (ref 135–145)
SP GR UR STRIP: 1.01 (ref 1–1.03)
SQUAMOUS EPITHELIAL: 3 /HPF
SYSTOLIC BLOOD PRESSURE - MUSE: NORMAL MMHG
T AXIS - MUSE: 31 DEGREES
TRANSITIONAL EPI: <1 /HPF
TROPONIN T SERPL HS-MCNC: <6 NG/L
UROBILINOGEN UR STRIP-MCNC: NORMAL MG/DL
VENTRICULAR RATE- MUSE: 90 BPM
WBC # BLD AUTO: 12.3 10E3/UL (ref 4–11)
WBC URINE: 2 /HPF

## 2025-03-10 PROCEDURE — 85379 FIBRIN DEGRADATION QUANT: CPT | Performed by: STUDENT IN AN ORGANIZED HEALTH CARE EDUCATION/TRAINING PROGRAM

## 2025-03-10 PROCEDURE — 85025 COMPLETE CBC W/AUTO DIFF WBC: CPT | Performed by: STUDENT IN AN ORGANIZED HEALTH CARE EDUCATION/TRAINING PROGRAM

## 2025-03-10 PROCEDURE — 258N000003 HC RX IP 258 OP 636: Performed by: STUDENT IN AN ORGANIZED HEALTH CARE EDUCATION/TRAINING PROGRAM

## 2025-03-10 PROCEDURE — 99285 EMERGENCY DEPT VISIT HI MDM: CPT | Mod: 25 | Performed by: STUDENT IN AN ORGANIZED HEALTH CARE EDUCATION/TRAINING PROGRAM

## 2025-03-10 PROCEDURE — G0463 HOSPITAL OUTPT CLINIC VISIT: HCPCS | Mod: 27

## 2025-03-10 PROCEDURE — 85730 THROMBOPLASTIN TIME PARTIAL: CPT | Performed by: STUDENT IN AN ORGANIZED HEALTH CARE EDUCATION/TRAINING PROGRAM

## 2025-03-10 PROCEDURE — 93010 ELECTROCARDIOGRAM REPORT: CPT | Performed by: STUDENT IN AN ORGANIZED HEALTH CARE EDUCATION/TRAINING PROGRAM

## 2025-03-10 PROCEDURE — 250N000011 HC RX IP 250 OP 636: Performed by: STUDENT IN AN ORGANIZED HEALTH CARE EDUCATION/TRAINING PROGRAM

## 2025-03-10 PROCEDURE — 99213 OFFICE O/P EST LOW 20 MIN: CPT | Mod: 25 | Performed by: ADVANCED PRACTICE MIDWIFE

## 2025-03-10 PROCEDURE — 85018 HEMOGLOBIN: CPT | Performed by: STUDENT IN AN ORGANIZED HEALTH CARE EDUCATION/TRAINING PROGRAM

## 2025-03-10 PROCEDURE — 59025 FETAL NON-STRESS TEST: CPT | Mod: 26 | Performed by: ADVANCED PRACTICE MIDWIFE

## 2025-03-10 PROCEDURE — 84484 ASSAY OF TROPONIN QUANT: CPT | Performed by: STUDENT IN AN ORGANIZED HEALTH CARE EDUCATION/TRAINING PROGRAM

## 2025-03-10 PROCEDURE — 85610 PROTHROMBIN TIME: CPT | Performed by: STUDENT IN AN ORGANIZED HEALTH CARE EDUCATION/TRAINING PROGRAM

## 2025-03-10 PROCEDURE — 82310 ASSAY OF CALCIUM: CPT | Performed by: STUDENT IN AN ORGANIZED HEALTH CARE EDUCATION/TRAINING PROGRAM

## 2025-03-10 PROCEDURE — 71275 CT ANGIOGRAPHY CHEST: CPT

## 2025-03-10 PROCEDURE — 84075 ASSAY ALKALINE PHOSPHATASE: CPT | Performed by: STUDENT IN AN ORGANIZED HEALTH CARE EDUCATION/TRAINING PROGRAM

## 2025-03-10 PROCEDURE — 36415 COLL VENOUS BLD VENIPUNCTURE: CPT | Performed by: STUDENT IN AN ORGANIZED HEALTH CARE EDUCATION/TRAINING PROGRAM

## 2025-03-10 PROCEDURE — 250N000009 HC RX 250: Performed by: STUDENT IN AN ORGANIZED HEALTH CARE EDUCATION/TRAINING PROGRAM

## 2025-03-10 PROCEDURE — 250N000013 HC RX MED GY IP 250 OP 250 PS 637: Performed by: STUDENT IN AN ORGANIZED HEALTH CARE EDUCATION/TRAINING PROGRAM

## 2025-03-10 PROCEDURE — 99285 EMERGENCY DEPT VISIT HI MDM: CPT | Performed by: STUDENT IN AN ORGANIZED HEALTH CARE EDUCATION/TRAINING PROGRAM

## 2025-03-10 PROCEDURE — 96360 HYDRATION IV INFUSION INIT: CPT | Performed by: STUDENT IN AN ORGANIZED HEALTH CARE EDUCATION/TRAINING PROGRAM

## 2025-03-10 PROCEDURE — 85460 HEMOGLOBIN FETAL: CPT | Performed by: STUDENT IN AN ORGANIZED HEALTH CARE EDUCATION/TRAINING PROGRAM

## 2025-03-10 PROCEDURE — 93005 ELECTROCARDIOGRAM TRACING: CPT | Performed by: STUDENT IN AN ORGANIZED HEALTH CARE EDUCATION/TRAINING PROGRAM

## 2025-03-10 PROCEDURE — 81001 URINALYSIS AUTO W/SCOPE: CPT | Performed by: STUDENT IN AN ORGANIZED HEALTH CARE EDUCATION/TRAINING PROGRAM

## 2025-03-10 PROCEDURE — 87637 SARSCOV2&INF A&B&RSV AMP PRB: CPT | Performed by: STUDENT IN AN ORGANIZED HEALTH CARE EDUCATION/TRAINING PROGRAM

## 2025-03-10 RX ORDER — IOPAMIDOL 755 MG/ML
100 INJECTION, SOLUTION INTRAVASCULAR ONCE
Status: COMPLETED | OUTPATIENT
Start: 2025-03-10 | End: 2025-03-10

## 2025-03-10 RX ORDER — ONDANSETRON 2 MG/ML
4 INJECTION INTRAMUSCULAR; INTRAVENOUS EVERY 30 MIN PRN
Status: DISCONTINUED | OUTPATIENT
Start: 2025-03-10 | End: 2025-03-10 | Stop reason: HOSPADM

## 2025-03-10 RX ORDER — LIDOCAINE 40 MG/G
CREAM TOPICAL
Status: DISCONTINUED | OUTPATIENT
Start: 2025-03-10 | End: 2025-03-10 | Stop reason: HOSPADM

## 2025-03-10 RX ORDER — ACETAMINOPHEN 500 MG
1000 TABLET ORAL ONCE
Status: COMPLETED | OUTPATIENT
Start: 2025-03-10 | End: 2025-03-10

## 2025-03-10 RX ORDER — ALBUTEROL SULFATE 90 UG/1
2 INHALANT RESPIRATORY (INHALATION) EVERY 6 HOURS PRN
Qty: 18 G | Refills: 0 | Status: SHIPPED | OUTPATIENT
Start: 2025-03-10

## 2025-03-10 RX ADMIN — SODIUM CHLORIDE 84 ML: 9 INJECTION, SOLUTION INTRAVENOUS at 13:17

## 2025-03-10 RX ADMIN — IOPAMIDOL 64 ML: 755 INJECTION, SOLUTION INTRAVENOUS at 13:15

## 2025-03-10 RX ADMIN — ACETAMINOPHEN 1000 MG: 500 TABLET ORAL at 13:28

## 2025-03-10 RX ADMIN — SODIUM CHLORIDE, POTASSIUM CHLORIDE, SODIUM LACTATE AND CALCIUM CHLORIDE 1000 ML: 600; 310; 30; 20 INJECTION, SOLUTION INTRAVENOUS at 12:56

## 2025-03-10 ASSESSMENT — ACTIVITIES OF DAILY LIVING (ADL)
ADLS_ACUITY_SCORE: 42
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
## 2025-03-10 NOTE — ED NOTES
Pt arrived to ED with complaint of syncope, fainting/dizziness .  Pt reports 35  weeks pregnant.   Pt is having contractions No.   Pt feels urge to push No.   Pt reports water broke No.   Report was called and pt was transferred to L&D Yes.      Pt to remain in ED until EKG and CT possible medical clearance.  Call again when complete to [facility] *[id number]

## 2025-03-10 NOTE — PROGRESS NOTES
Pt feeling tired, wants to go home and go to bed.    FHTS baseline 120, moderate variability, multiple positive accels, no contractions noted in the last half hour.    A/P:  IUP @ 35w0d   Reactive NST, cat 1 fetal monitoring after fall  Rh neg, received rhogam on 1/21/25, neg KB  Discussed warning s/s, when to call or return to hospital.  Has next prenatal appt next week.    JORGE Hernandez CNM
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
## 2025-03-10 NOTE — TELEPHONE ENCOUNTER
"Pt called stating that she felt \"Whoozy\".  Pt states that she stood up and passed out.  Pt has facial cut and thinks that she hit her head on something. Pt states that baby is moving ok and no vaginal bleeding.  Discussed with CNM and pt to go to the ER first for evaluation of laceration.    "

## 2025-03-10 NOTE — DISCHARGE INSTRUCTIONS
Thank you for coming to the [facility].  You were seen in the emergency department and your care was discussed with the obstetrics team.  Your workup today is reassuring against life-threatening causes of your loss of consciousness.  This may be related to fluid shifting and balance changing in this late stage of your pregnancy.  We would recommend that you wear compression stockings, stay well-hydrated -you can use oral rehydration beverages like liquid IV or Pedialyte in addition to regular fluid intake.    Your CT scan does not show evidence of a blood clot, there were airway findings of bronchiolitis, inflammation of small airways which may be related to her recent viral infection.  If you feel your chest is getting tight or wheezing please use the prescribed inhaler.    If you develop new or concerning symptoms such as repeat fainting, persistent lightheadedness, significant chest pain or shortness of breath, abdominal or pelvic pain, vaginal fluid loss or discharge please immediately return to the hospital for evaluation.

## 2025-03-10 NOTE — PROGRESS NOTES
Data: Patient assessed in the [facility] for fall/trauma. Cervical exam deferred. Membranes intact. Contractions are present. Contactions are  2-5 minutes apart, and last  seconds. Uterine assessment is mild by palpation during contractions and soft by palpation at rest. See flowsheets for fetal assessment documentation.     Action: Presumed adequate fetal oxygenation documented. Discharge instructions reviewed. Patient instructed to report change in fetal movement, vaginal leaking of fluid or bleeding, abdominal pain, or any concerns related to the pregnancy to provider/clinic.      Response: Orders to discharge home per Kellie Morin CNM. Patient verbalized understanding of education and agreement with plan. Discharged to home at 1620.

## 2025-03-10 NOTE — PLAN OF CARE
"Pt to L&D triage after evaluation in ED. Syncope and fall late morning, not witnessed. Felt hypotensive similar to when she stands up too fast, was trying to get to couch but doesn't remember the fall or how she fell. Broke her glasses and abrasion under right eye.  was at her home to assist and called pt's . Reports \"my face hurts\" and  c/o HA and tylenol given in ED. Declines ice or hot packs at this time. Reactive NST, ctx now q2-3 but wasn't prior to CT scan at approx 1330. Feels a bit crampy but has over past few days, palp mild. No LOF or bleeding, baby is quite active. Anticipate discharge.  "

## 2025-03-11 ENCOUNTER — TELEPHONE (OUTPATIENT)
Dept: MIDWIFE SERVICES | Facility: CLINIC | Age: 30
End: 2025-03-11
Payer: COMMERCIAL

## 2025-03-11 NOTE — TELEPHONE ENCOUNTER
"Agree with RN recommendations. \"Pt had not picked up inhaler yet but was going to do that now to try inhaler for the SOB. Pt instructed to go back into the ED for any of the symptoms above\".  Recommend appointment tomorrow if sx do not resolve.     Queenie Wing, GUILLAUMEM, APRN CNM    "

## 2025-03-11 NOTE — TELEPHONE ENCOUNTER
"30 yo F  35w1d    Following up on ED visit yesterday     Experiencing breathlessness , heart is racing - says this happened before the fall yesterday as well.    Pt feels fatigued and winded    Per discharge summary:  \"Your workup today is reassuring against life-threatening causes of  your loss of consciousness. This may be related to fluid shifting and balance changing in this late stage of your  pregnancy. We would recommend that you wear compression stockings, stay well-hydrated -you can use oral  rehydration beverages like liquid IV or Pedialyte in addition to regular fluid intake.  Your CT scan does not show evidence of a blood clot, there were airway findings of bronchiolitis, inflammation of small  airways which may be related to her recent viral infection. If you feel your chest is getting tight or wheezing please use  the prescribed inhaler.  If you develop new or concerning symptoms such as repeat fainting, persistent lightheadedness, significant chest pain  or shortness of breath, abdominal or pelvic pain, vaginal fluid loss or discharge please immediately return to the  hospital for evaluation\"    Pt had not picked up inhaler yet but was going to do that now to try inhaler for the SOB.  Pt instructed to go back into the ED for any of the symptoms above    Routing to provider to advise.  She has an appointment next week, would you like to see her before then?  Ro Munoz RN on 3/11/2025 at 11:47 AM    "

## 2025-03-16 NOTE — PROGRESS NOTES
35w6d  Regina is here today for a routine prenatal visit with her  and son Nicole. Denies vaginal bleeding, loss of fluid, or regular contractions. Reports fetal movement appropriate for gestational age.  Was seen in the ED and in L&D triage on 3/10 after briefly losing consciousness and falling at home. She had a negative CT scan and abruption labs were normal. She did have a recent viral illness prior to this episode and was prescribed an albuterol inhaler. She continues to have intermittent episodes of shortness of breath with exertion and a sensation like her heart is racing. Has been trying to take it easy at work. 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 Encouraged continued rest, inhaler use PRN, staying well hydrated, use of electrolytes or salty snack with episodes. Discussed option of cardiology referral; after discussion, will see how the next week goes, plan for cardiology referral if symptoms worsen or do not improve within the week with these measures.  Reviewed warning signs for which to notify provider/visit ED.  Hgb on 3/10 was 11.8, will not repeat today. GBS collected. BSUS confirms cephalic presentation.  Return precautions provided. Return in 1 week or sooner as needed.

## 2025-03-17 ENCOUNTER — TELEPHONE (OUTPATIENT)
Dept: NURSING | Facility: CLINIC | Age: 30
End: 2025-03-17
Payer: COMMERCIAL

## 2025-03-17 NOTE — TELEPHONE ENCOUNTER
Pt concerned due to elevated hrt rate and SOB.  Pt states that she has been using her inhaler.  Pt stated that she count 20 beats/10 sec (120 beats/min).  Pt does report that it has come down now to 16 (96 beats/min).  Pt advised that it is ok as long as her H rt comes back down after having a an elevated episode, probably cause by steroid inhaler.  Pt verbalized understanding and has appt 3/18/25.

## 2025-03-18 ENCOUNTER — PRENATAL OFFICE VISIT (OUTPATIENT)
Dept: MIDWIFE SERVICES | Facility: CLINIC | Age: 30
End: 2025-03-18
Payer: COMMERCIAL

## 2025-03-18 VITALS
OXYGEN SATURATION: 98 % | SYSTOLIC BLOOD PRESSURE: 116 MMHG | DIASTOLIC BLOOD PRESSURE: 75 MMHG | WEIGHT: 174.2 LBS | HEART RATE: 120 BPM | BODY MASS INDEX: 29.9 KG/M2

## 2025-03-18 DIAGNOSIS — O99.891 SHORTNESS OF BREATH DURING PREGNANCY: Primary | ICD-10-CM

## 2025-03-18 DIAGNOSIS — R06.02 SHORTNESS OF BREATH DURING PREGNANCY: Primary | ICD-10-CM

## 2025-03-18 DIAGNOSIS — Z34.80 SUPERVISION OF OTHER NORMAL PREGNANCY, ANTEPARTUM: ICD-10-CM

## 2025-03-19 LAB — GP B STREP DNA SPEC QL NAA+PROBE: NEGATIVE

## 2025-03-25 ENCOUNTER — MYC MEDICAL ADVICE (OUTPATIENT)
Dept: MIDWIFE SERVICES | Facility: CLINIC | Age: 30
End: 2025-03-25

## 2025-03-25 ENCOUNTER — PRENATAL OFFICE VISIT (OUTPATIENT)
Dept: MIDWIFE SERVICES | Facility: CLINIC | Age: 30
End: 2025-03-25
Payer: COMMERCIAL

## 2025-03-25 VITALS
BODY MASS INDEX: 30.04 KG/M2 | HEART RATE: 94 BPM | WEIGHT: 175 LBS | SYSTOLIC BLOOD PRESSURE: 124 MMHG | DIASTOLIC BLOOD PRESSURE: 71 MMHG

## 2025-03-25 DIAGNOSIS — Z34.80 SUPERVISION OF OTHER NORMAL PREGNANCY, ANTEPARTUM: Primary | ICD-10-CM

## 2025-03-25 NOTE — PROGRESS NOTES
37w1d  Regina is here today for a routine prenatal visit. Here today with her  and son.. Denies vaginal bleeding, loss of fluid, or regular contractions. Reports fetal movement appropriate for gestational age.  Continues to have occasional episodes of dizziness, finds she is managing them well - working from home, sitting down as needed.  Reviewed GBS negative result. Reviewed signs of labor and when to notify provider.  Return precautions provided. Return in 1 week or sooner as needed.

## 2025-03-26 ENCOUNTER — TELEPHONE (OUTPATIENT)
Dept: MIDWIFE SERVICES | Facility: CLINIC | Age: 30
End: 2025-03-26
Payer: COMMERCIAL

## 2025-03-26 ENCOUNTER — HOSPITAL ENCOUNTER (EMERGENCY)
Facility: CLINIC | Age: 30
Discharge: HOME | End: 2025-03-26
Attending: INTERNAL MEDICINE | Admitting: INTERNAL MEDICINE
Payer: COMMERCIAL

## 2025-03-26 VITALS
TEMPERATURE: 97.6 F | OXYGEN SATURATION: 98 % | WEIGHT: 178.5 LBS | RESPIRATION RATE: 16 BRPM | SYSTOLIC BLOOD PRESSURE: 109 MMHG | BODY MASS INDEX: 30.64 KG/M2 | HEART RATE: 81 BPM | DIASTOLIC BLOOD PRESSURE: 69 MMHG

## 2025-03-26 DIAGNOSIS — Z34.93 THIRD TRIMESTER PREGNANCY: ICD-10-CM

## 2025-03-26 DIAGNOSIS — R51.9 HEADACHE: ICD-10-CM

## 2025-03-26 LAB
ALBUMIN MFR UR ELPH: 12.6 MG/DL
ALBUMIN SERPL BCG-MCNC: 3.8 G/DL (ref 3.5–5.2)
ALBUMIN UR-MCNC: NEGATIVE MG/DL
ALP SERPL-CCNC: 95 U/L (ref 40–150)
ALT SERPL W P-5'-P-CCNC: 7 U/L (ref 0–50)
ANION GAP SERPL CALCULATED.3IONS-SCNC: 13 MMOL/L (ref 7–15)
APPEARANCE UR: CLEAR
AST SERPL W P-5'-P-CCNC: 14 U/L (ref 0–45)
BASOPHILS # BLD AUTO: 0.1 10E3/UL (ref 0–0.2)
BASOPHILS NFR BLD AUTO: 0 %
BILIRUB SERPL-MCNC: 0.2 MG/DL
BILIRUB UR QL STRIP: NEGATIVE
BUN SERPL-MCNC: 5.6 MG/DL (ref 6–20)
CALCIUM SERPL-MCNC: 8.5 MG/DL (ref 8.8–10.4)
CHLORIDE SERPL-SCNC: 102 MMOL/L (ref 98–107)
COLOR UR AUTO: YELLOW
CREAT SERPL-MCNC: 0.48 MG/DL (ref 0.51–0.95)
CREAT UR-MCNC: 108 MG/DL
EGFRCR SERPLBLD CKD-EPI 2021: >90 ML/MIN/1.73M2
EOSINOPHIL # BLD AUTO: 0.1 10E3/UL (ref 0–0.7)
EOSINOPHIL NFR BLD AUTO: 1 %
ERYTHROCYTE [DISTWIDTH] IN BLOOD BY AUTOMATED COUNT: 13.5 % (ref 10–15)
GLUCOSE SERPL-MCNC: 112 MG/DL (ref 70–99)
GLUCOSE UR STRIP-MCNC: NEGATIVE MG/DL
HCO3 SERPL-SCNC: 21 MMOL/L (ref 22–29)
HCT VFR BLD AUTO: 35 % (ref 35–47)
HGB BLD-MCNC: 11.9 G/DL (ref 11.7–15.7)
HGB UR QL STRIP: NEGATIVE
IMM GRANULOCYTES # BLD: 0.1 10E3/UL
IMM GRANULOCYTES NFR BLD: 1 %
KETONES UR STRIP-MCNC: ABNORMAL MG/DL
LEUKOCYTE ESTERASE UR QL STRIP: NEGATIVE
LYMPHOCYTES # BLD AUTO: 2.9 10E3/UL (ref 0.8–5.3)
LYMPHOCYTES NFR BLD AUTO: 23 %
MCH RBC QN AUTO: 29.7 PG (ref 26.5–33)
MCHC RBC AUTO-ENTMCNC: 34 G/DL (ref 31.5–36.5)
MCV RBC AUTO: 87 FL (ref 78–100)
MONOCYTES # BLD AUTO: 0.7 10E3/UL (ref 0–1.3)
MONOCYTES NFR BLD AUTO: 6 %
MUCOUS THREADS #/AREA URNS LPF: PRESENT /LPF
NEUTROPHILS # BLD AUTO: 8.6 10E3/UL (ref 1.6–8.3)
NEUTROPHILS NFR BLD AUTO: 70 %
NITRATE UR QL: NEGATIVE
NRBC # BLD AUTO: 0 10E3/UL
NRBC BLD AUTO-RTO: 0 /100
NT-PROBNP SERPL-MCNC: 54 PG/ML (ref 0–450)
PH UR STRIP: 6.5 [PH] (ref 5–7)
PLATELET # BLD AUTO: 281 10E3/UL (ref 150–450)
POTASSIUM SERPL-SCNC: 3.5 MMOL/L (ref 3.4–5.3)
PROT SERPL-MCNC: 6.7 G/DL (ref 6.4–8.3)
PROT/CREAT 24H UR: 0.12 MG/MG CR (ref 0–0.2)
RBC # BLD AUTO: 4.01 10E6/UL (ref 3.8–5.2)
RBC URINE: 0 /HPF
SODIUM SERPL-SCNC: 136 MMOL/L (ref 135–145)
SP GR UR STRIP: 1.02 (ref 1–1.03)
SQUAMOUS EPITHELIAL: 2 /HPF
UROBILINOGEN UR STRIP-MCNC: NORMAL MG/DL
WBC # BLD AUTO: 12.4 10E3/UL (ref 4–11)
WBC URINE: 1 /HPF

## 2025-03-26 PROCEDURE — 99285 EMERGENCY DEPT VISIT HI MDM: CPT | Mod: 25 | Performed by: INTERNAL MEDICINE

## 2025-03-26 PROCEDURE — 84155 ASSAY OF PROTEIN SERUM: CPT | Performed by: PHYSICIAN ASSISTANT

## 2025-03-26 PROCEDURE — 93005 ELECTROCARDIOGRAM TRACING: CPT | Performed by: INTERNAL MEDICINE

## 2025-03-26 PROCEDURE — 96374 THER/PROPH/DIAG INJ IV PUSH: CPT | Performed by: INTERNAL MEDICINE

## 2025-03-26 PROCEDURE — 93010 ELECTROCARDIOGRAM REPORT: CPT | Performed by: INTERNAL MEDICINE

## 2025-03-26 PROCEDURE — 84156 ASSAY OF PROTEIN URINE: CPT

## 2025-03-26 PROCEDURE — 250N000011 HC RX IP 250 OP 636: Performed by: PHYSICIAN ASSISTANT

## 2025-03-26 PROCEDURE — 250N000013 HC RX MED GY IP 250 OP 250 PS 637: Performed by: PHYSICIAN ASSISTANT

## 2025-03-26 PROCEDURE — 81001 URINALYSIS AUTO W/SCOPE: CPT | Performed by: PHYSICIAN ASSISTANT

## 2025-03-26 PROCEDURE — 36415 COLL VENOUS BLD VENIPUNCTURE: CPT | Performed by: PHYSICIAN ASSISTANT

## 2025-03-26 PROCEDURE — 85041 AUTOMATED RBC COUNT: CPT | Performed by: PHYSICIAN ASSISTANT

## 2025-03-26 PROCEDURE — 84450 TRANSFERASE (AST) (SGOT): CPT | Performed by: PHYSICIAN ASSISTANT

## 2025-03-26 PROCEDURE — 99285 EMERGENCY DEPT VISIT HI MDM: CPT | Performed by: INTERNAL MEDICINE

## 2025-03-26 PROCEDURE — 85004 AUTOMATED DIFF WBC COUNT: CPT | Performed by: PHYSICIAN ASSISTANT

## 2025-03-26 PROCEDURE — 83880 ASSAY OF NATRIURETIC PEPTIDE: CPT | Performed by: PHYSICIAN ASSISTANT

## 2025-03-26 RX ORDER — ACETAMINOPHEN 500 MG
1000 TABLET ORAL ONCE
Status: COMPLETED | OUTPATIENT
Start: 2025-03-26 | End: 2025-03-26

## 2025-03-26 RX ORDER — METOCLOPRAMIDE HYDROCHLORIDE 5 MG/ML
10 INJECTION INTRAMUSCULAR; INTRAVENOUS ONCE
Status: COMPLETED | OUTPATIENT
Start: 2025-03-26 | End: 2025-03-26

## 2025-03-26 RX ADMIN — ACETAMINOPHEN 1000 MG: 500 TABLET ORAL at 17:15

## 2025-03-26 RX ADMIN — METOCLOPRAMIDE HYDROCHLORIDE 10 MG: 5 INJECTION INTRAMUSCULAR; INTRAVENOUS at 17:28

## 2025-03-26 ASSESSMENT — ACTIVITIES OF DAILY LIVING (ADL)
ADLS_ACUITY_SCORE: 42

## 2025-03-26 ASSESSMENT — COLUMBIA-SUICIDE SEVERITY RATING SCALE - C-SSRS
2. HAVE YOU ACTUALLY HAD ANY THOUGHTS OF KILLING YOURSELF IN THE PAST MONTH?: NO
1. IN THE PAST MONTH, HAVE YOU WISHED YOU WERE DEAD OR WISHED YOU COULD GO TO SLEEP AND NOT WAKE UP?: NO
6. HAVE YOU EVER DONE ANYTHING, STARTED TO DO ANYTHING, OR PREPARED TO DO ANYTHING TO END YOUR LIFE?: NO

## 2025-03-26 NOTE — TELEPHONE ENCOUNTER
Pt 30 yo F 37w2d     RN called pt back and relayed SUE CNM response. Offered a return visit if sx were mild enough.  pt said she has had some SOB and chest pain and the headache is persisting with rest/ hydration/ & tylenol. Pt was advised to go into the ED asap. Pt has a ride to the ED    Routing to provider as FELIPE Munoz RN on 3/26/2025 at 2:24 PM
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
## 2025-03-26 NOTE — PROVIDER NOTIFICATION
03/26/25 1628   Provider Notification   Provider Name/Title Dr. Reed   Method of Notification Electronic Page   Notification Reason Status Update     Pt denies LOF, vaginal bleeding, or contraction like pain. Pt reports SOB and HA 6/10 that hasn't went away with Tylenol. No RUQ pain or vision changes. EFM applied.

## 2025-03-26 NOTE — TELEPHONE ENCOUNTER
PAPERWORK REQUEST    Form received on: 03/25/2025  How was form received: Rufus  Preferred Provider: Any Available CNM  Type of form: Restrictions/Workability  Date needed by: ASAP  What to do with form once completed: Rufus Sent  Where was form placed?: provider basket  Has an ROMAN been signed? Not Applicable    Additional Notes: Filled out and placed in provider basket

## 2025-03-26 NOTE — TELEPHONE ENCOUNTER
30 yo F  37w2d    Patient calling in to report headaches, dizziness/ lightheadedness (last week), right side back pain (last day), increased HR    States she has a hx of headaches    Says she has random headaches the last week, says they're pretty bad, but come and go    Headache goes away with a nap/ tylenol    Last night she felt lightheaded/ dizzy- fell asleep and when she woke up then it turned into a headache that has persisted throughout the day    Adequate water, eating normally, slept well last night    BP Readings from Last 3 Encounters:   25 124/71   25 116/75   03/10/25 103/54     Swelling- none   RUQ pain- none  Vision changes- none    Routing to provider to advise.  She was in yesterday for an appointment with SUE VAIL, Pt fainted 3/10 and hit head and was seen in the ED. Pt is off work today, at home resting but wondering if she should be seen   How would you like to proceed? ED?    Ro Munoz RN on 3/26/2025 at 1:21 PM

## 2025-03-26 NOTE — ED TRIAGE NOTES
Pt having waves of dizziness that lead to headaches. Pt took tylenol at home before coming to the ED. Pt fell two weeks ago due to dizziness. Pt states that these headaches are new for her. Current headache for 12 hours.      Triage Assessment (Adult)       Row Name 03/26/25 1553          Triage Assessment    Airway WDL WDL        Respiratory WDL    Respiratory WDL WDL        Skin Circulation/Temperature WDL    Skin Circulation/Temperature WDL WDL        Cardiac WDL    Cardiac WDL WDL        Peripheral/Neurovascular WDL    Peripheral Neurovascular WDL WDL        Cognitive/Neuro/Behavioral WDL    Cognitive/Neuro/Behavioral WDL X     Level of Consciousness alert     Arousal Level opens eyes spontaneously     Orientation oriented x 4     Speech logical;spontaneous;clear     Mood/Behavior calm;cooperative        Pupils (CN II)    Pupil PERRLA yes     Pupil Size Left 2 mm     Pupil Size Right 2 mm        [place] Coma Scale    Best Eye Response 4-->(E4) spontaneous     Best Motor Response 6-->(M6) obeys commands     Best Verbal Response 5-->(V5) oriented     [place] Coma Scale Score 15     Assessment Qualifiers patient not sedated/intubated

## 2025-03-27 LAB
ATRIAL RATE - MUSE: 88 BPM
DIASTOLIC BLOOD PRESSURE - MUSE: NORMAL MMHG
INTERPRETATION ECG - MUSE: NORMAL
P AXIS - MUSE: 31 DEGREES
PR INTERVAL - MUSE: 158 MS
QRS DURATION - MUSE: 80 MS
QT - MUSE: 366 MS
QTC - MUSE: 442 MS
R AXIS - MUSE: 0 DEGREES
SYSTOLIC BLOOD PRESSURE - MUSE: NORMAL MMHG
T AXIS - MUSE: 28 DEGREES
VENTRICULAR RATE- MUSE: 88 BPM

## 2025-03-27 NOTE — DISCHARGE INSTRUCTIONS
All your tests tonight about unrevealing, including your scans.  Your headache is most likely related to your history of migraines.  Unfortunately, as we discussed, given your pregnancy our treatment options are limited.  You can take Tylenol at home as needed.  Follow-up with your OB/GYN.  Return to the ER for worsening symptoms.

## 2025-04-01 ENCOUNTER — PRENATAL OFFICE VISIT (OUTPATIENT)
Dept: MIDWIFE SERVICES | Facility: CLINIC | Age: 30
End: 2025-04-01
Payer: COMMERCIAL

## 2025-04-01 VITALS
SYSTOLIC BLOOD PRESSURE: 117 MMHG | HEART RATE: 108 BPM | BODY MASS INDEX: 30.48 KG/M2 | WEIGHT: 177.6 LBS | DIASTOLIC BLOOD PRESSURE: 74 MMHG | OXYGEN SATURATION: 98 %

## 2025-04-01 DIAGNOSIS — Z34.83 ENCOUNTER FOR SUPERVISION OF OTHER NORMAL PREGNANCY, THIRD TRIMESTER: Primary | ICD-10-CM

## 2025-04-01 PROCEDURE — 99207 PR PRENATAL VISIT: CPT | Performed by: ADVANCED PRACTICE MIDWIFE

## 2025-04-01 PROCEDURE — 3074F SYST BP LT 130 MM HG: CPT | Performed by: ADVANCED PRACTICE MIDWIFE

## 2025-04-01 PROCEDURE — 0502F SUBSEQUENT PRENATAL CARE: CPT | Performed by: ADVANCED PRACTICE MIDWIFE

## 2025-04-01 PROCEDURE — 3078F DIAST BP <80 MM HG: CPT | Performed by: ADVANCED PRACTICE MIDWIFE

## 2025-04-01 NOTE — PROGRESS NOTES
38w1d  Feeling well. Still has episodes of racing heart and SOB. SINHA has gone away. Had full work up recently in ED with normal results. HOping for labor soon. Reports good fetal movement. Denies leaking of fluid, vaginal bleeding, regular uterine contractions, headache or other concerns.  RTC in 1 wk [facility]

## 2025-04-08 ENCOUNTER — PRE VISIT (OUTPATIENT)
Dept: NEUROLOGY | Facility: CLINIC | Age: 30
End: 2025-04-08

## 2025-04-08 ENCOUNTER — PRENATAL OFFICE VISIT (OUTPATIENT)
Dept: MIDWIFE SERVICES | Facility: CLINIC | Age: 30
End: 2025-04-08
Payer: COMMERCIAL

## 2025-04-08 ENCOUNTER — HOSPITAL ENCOUNTER (INPATIENT)
Facility: CLINIC | Age: 30
End: 2025-04-08
Attending: ADVANCED PRACTICE MIDWIFE | Admitting: ADVANCED PRACTICE MIDWIFE
Payer: COMMERCIAL

## 2025-04-08 ENCOUNTER — ANESTHESIA EVENT (OUTPATIENT)
Dept: OBGYN | Facility: CLINIC | Age: 30
End: 2025-04-08
Payer: COMMERCIAL

## 2025-04-08 ENCOUNTER — ANESTHESIA (OUTPATIENT)
Dept: OBGYN | Facility: CLINIC | Age: 30
End: 2025-04-08
Payer: COMMERCIAL

## 2025-04-08 VITALS
SYSTOLIC BLOOD PRESSURE: 116 MMHG | WEIGHT: 180.3 LBS | HEART RATE: 112 BPM | BODY MASS INDEX: 30.95 KG/M2 | DIASTOLIC BLOOD PRESSURE: 79 MMHG | OXYGEN SATURATION: 98 %

## 2025-04-08 DIAGNOSIS — Z34.83 ENCOUNTER FOR SUPERVISION OF OTHER NORMAL PREGNANCY, THIRD TRIMESTER: Primary | ICD-10-CM

## 2025-04-08 DIAGNOSIS — O34.219 HISTORY OF CESAREAN DELIVERY, CURRENTLY PREGNANT: ICD-10-CM

## 2025-04-08 DIAGNOSIS — O34.219 VBAC, DELIVERED: ICD-10-CM

## 2025-04-08 DIAGNOSIS — O34.219 VBAC (VAGINAL BIRTH AFTER CESAREAN): Primary | ICD-10-CM

## 2025-04-08 LAB
ABO + RH BLD: ABNORMAL
BLD GP AB INVEST PLASRBC-IMP: NORMAL
BLD GP AB SCN SERPL QL: POSITIVE
ERYTHROCYTE [DISTWIDTH] IN BLOOD BY AUTOMATED COUNT: 13.2 % (ref 10–15)
HCT VFR BLD AUTO: 32.2 % (ref 35–47)
HGB BLD-MCNC: 11.1 G/DL (ref 11.7–15.7)
MCH RBC QN AUTO: 30.2 PG (ref 26.5–33)
MCHC RBC AUTO-ENTMCNC: 34.5 G/DL (ref 31.5–36.5)
MCV RBC AUTO: 88 FL (ref 78–100)
PLATELET # BLD AUTO: 287 10E3/UL (ref 150–450)
RBC # BLD AUTO: 3.68 10E6/UL (ref 3.8–5.2)
SPECIMEN EXP DATE BLD: ABNORMAL
SPECIMEN EXP DATE BLD: NORMAL
WBC # BLD AUTO: 11.1 10E3/UL (ref 4–11)

## 2025-04-08 PROCEDURE — 120N000002 HC R&B MED SURG/OB UMMC

## 2025-04-08 PROCEDURE — 85014 HEMATOCRIT: CPT | Performed by: ADVANCED PRACTICE MIDWIFE

## 2025-04-08 PROCEDURE — 59200 INSERT CERVICAL DILATOR: CPT | Performed by: ADVANCED PRACTICE MIDWIFE

## 2025-04-08 PROCEDURE — 99207 PR PRENATAL VISIT: CPT | Performed by: ADVANCED PRACTICE MIDWIFE

## 2025-04-08 PROCEDURE — 3078F DIAST BP <80 MM HG: CPT | Performed by: ADVANCED PRACTICE MIDWIFE

## 2025-04-08 PROCEDURE — 86870 RBC ANTIBODY IDENTIFICATION: CPT | Performed by: ADVANCED PRACTICE MIDWIFE

## 2025-04-08 PROCEDURE — 370N000003 HC ANESTHESIA WARD SERVICE: Performed by: STUDENT IN AN ORGANIZED HEALTH CARE EDUCATION/TRAINING PROGRAM

## 2025-04-08 PROCEDURE — 86780 TREPONEMA PALLIDUM: CPT | Performed by: ADVANCED PRACTICE MIDWIFE

## 2025-04-08 PROCEDURE — 99221 1ST HOSP IP/OBS SF/LOW 40: CPT | Mod: 25 | Performed by: ADVANCED PRACTICE MIDWIFE

## 2025-04-08 PROCEDURE — 250N000013 HC RX MED GY IP 250 OP 250 PS 637: Performed by: ADVANCED PRACTICE MIDWIFE

## 2025-04-08 PROCEDURE — 86900 BLOOD TYPING SEROLOGIC ABO: CPT | Performed by: ADVANCED PRACTICE MIDWIFE

## 2025-04-08 PROCEDURE — 3074F SYST BP LT 130 MM HG: CPT | Performed by: ADVANCED PRACTICE MIDWIFE

## 2025-04-08 PROCEDURE — 0502F SUBSEQUENT PRENATAL CARE: CPT | Performed by: ADVANCED PRACTICE MIDWIFE

## 2025-04-08 RX ORDER — MISOPROSTOL 200 UG/1
800 TABLET ORAL
Status: DISCONTINUED | OUTPATIENT
Start: 2025-04-08 | End: 2025-04-10 | Stop reason: HOSPADM

## 2025-04-08 RX ORDER — CARBOPROST TROMETHAMINE 250 UG/ML
250 INJECTION, SOLUTION INTRAMUSCULAR
Status: DISCONTINUED | OUTPATIENT
Start: 2025-04-08 | End: 2025-04-10 | Stop reason: HOSPADM

## 2025-04-08 RX ORDER — NALOXONE HYDROCHLORIDE 0.4 MG/ML
0.4 INJECTION, SOLUTION INTRAMUSCULAR; INTRAVENOUS; SUBCUTANEOUS
Status: DISCONTINUED | OUTPATIENT
Start: 2025-04-08 | End: 2025-04-09

## 2025-04-08 RX ORDER — CITRIC ACID/SODIUM CITRATE 334-500MG
30 SOLUTION, ORAL ORAL
Status: DISCONTINUED | OUTPATIENT
Start: 2025-04-08 | End: 2025-04-10 | Stop reason: HOSPADM

## 2025-04-08 RX ORDER — NALOXONE HYDROCHLORIDE 0.4 MG/ML
0.2 INJECTION, SOLUTION INTRAMUSCULAR; INTRAVENOUS; SUBCUTANEOUS
Status: DISCONTINUED | OUTPATIENT
Start: 2025-04-08 | End: 2025-04-09

## 2025-04-08 RX ORDER — METOCLOPRAMIDE 10 MG/1
10 TABLET ORAL EVERY 6 HOURS PRN
Status: DISCONTINUED | OUTPATIENT
Start: 2025-04-08 | End: 2025-04-10 | Stop reason: HOSPADM

## 2025-04-08 RX ORDER — OMEPRAZOLE 20 MG/1
20 CAPSULE, DELAYED RELEASE ORAL DAILY
Status: DISCONTINUED | OUTPATIENT
Start: 2025-04-09 | End: 2025-04-10

## 2025-04-08 RX ORDER — SODIUM CHLORIDE, SODIUM LACTATE, POTASSIUM CHLORIDE, CALCIUM CHLORIDE 600; 310; 30; 20 MG/100ML; MG/100ML; MG/100ML; MG/100ML
INJECTION, SOLUTION INTRAVENOUS CONTINUOUS
Status: DISCONTINUED | OUTPATIENT
Start: 2025-04-08 | End: 2025-04-10 | Stop reason: HOSPADM

## 2025-04-08 RX ORDER — METHYLERGONOVINE MALEATE 0.2 MG/ML
200 INJECTION INTRAVENOUS
Status: DISCONTINUED | OUTPATIENT
Start: 2025-04-08 | End: 2025-04-10 | Stop reason: HOSPADM

## 2025-04-08 RX ORDER — OXYTOCIN 10 [USP'U]/ML
10 INJECTION, SOLUTION INTRAMUSCULAR; INTRAVENOUS
Status: DISCONTINUED | OUTPATIENT
Start: 2025-04-08 | End: 2025-04-10 | Stop reason: HOSPADM

## 2025-04-08 RX ORDER — ONDANSETRON 4 MG/1
4 TABLET, ORALLY DISINTEGRATING ORAL EVERY 6 HOURS PRN
Status: DISCONTINUED | OUTPATIENT
Start: 2025-04-08 | End: 2025-04-10 | Stop reason: HOSPADM

## 2025-04-08 RX ORDER — OXYTOCIN/0.9 % SODIUM CHLORIDE 30/500 ML
100-340 PLASTIC BAG, INJECTION (ML) INTRAVENOUS CONTINUOUS PRN
Status: DISCONTINUED | OUTPATIENT
Start: 2025-04-08 | End: 2025-04-10

## 2025-04-08 RX ORDER — MORPHINE SULFATE 10 MG/ML
10 INJECTION, SOLUTION INTRAMUSCULAR; INTRAVENOUS
Status: DISCONTINUED | OUTPATIENT
Start: 2025-04-08 | End: 2025-04-09

## 2025-04-08 RX ORDER — ACETAMINOPHEN 325 MG/1
975 TABLET ORAL EVERY 6 HOURS PRN
Status: DISCONTINUED | OUTPATIENT
Start: 2025-04-08 | End: 2025-04-09

## 2025-04-08 RX ORDER — ACETAMINOPHEN 500 MG
500-1000 TABLET ORAL EVERY 6 HOURS PRN
Status: ON HOLD | COMMUNITY
End: 2025-04-10

## 2025-04-08 RX ORDER — TRANEXAMIC ACID 10 MG/ML
1 INJECTION, SOLUTION INTRAVENOUS EVERY 30 MIN PRN
Status: DISCONTINUED | OUTPATIENT
Start: 2025-04-08 | End: 2025-04-10 | Stop reason: HOSPADM

## 2025-04-08 RX ORDER — METOCLOPRAMIDE HYDROCHLORIDE 5 MG/ML
10 INJECTION INTRAMUSCULAR; INTRAVENOUS EVERY 6 HOURS PRN
Status: DISCONTINUED | OUTPATIENT
Start: 2025-04-08 | End: 2025-04-10 | Stop reason: HOSPADM

## 2025-04-08 RX ORDER — FENTANYL CITRATE 50 UG/ML
100 INJECTION, SOLUTION INTRAMUSCULAR; INTRAVENOUS
Status: DISCONTINUED | OUTPATIENT
Start: 2025-04-08 | End: 2025-04-10 | Stop reason: HOSPADM

## 2025-04-08 RX ORDER — KETOROLAC TROMETHAMINE 15 MG/ML
15 INJECTION, SOLUTION INTRAMUSCULAR; INTRAVENOUS
Status: COMPLETED | OUTPATIENT
Start: 2025-04-08 | End: 2025-04-10

## 2025-04-08 RX ORDER — OXYTOCIN 10 [USP'U]/ML
10 INJECTION, SOLUTION INTRAMUSCULAR; INTRAVENOUS
Status: DISCONTINUED | OUTPATIENT
Start: 2025-04-08 | End: 2025-04-10

## 2025-04-08 RX ORDER — MISOPROSTOL 200 UG/1
400 TABLET ORAL
Status: DISCONTINUED | OUTPATIENT
Start: 2025-04-08 | End: 2025-04-10 | Stop reason: HOSPADM

## 2025-04-08 RX ORDER — ACETAMINOPHEN 325 MG/1
650 TABLET ORAL EVERY 4 HOURS PRN
Status: DISCONTINUED | OUTPATIENT
Start: 2025-04-08 | End: 2025-04-08

## 2025-04-08 RX ORDER — OXYTOCIN/0.9 % SODIUM CHLORIDE 30/500 ML
340 PLASTIC BAG, INJECTION (ML) INTRAVENOUS CONTINUOUS PRN
Status: DISCONTINUED | OUTPATIENT
Start: 2025-04-08 | End: 2025-04-10 | Stop reason: HOSPADM

## 2025-04-08 RX ORDER — ONDANSETRON 2 MG/ML
4 INJECTION INTRAMUSCULAR; INTRAVENOUS EVERY 6 HOURS PRN
Status: DISCONTINUED | OUTPATIENT
Start: 2025-04-08 | End: 2025-04-10 | Stop reason: HOSPADM

## 2025-04-08 RX ORDER — IBUPROFEN 800 MG/1
800 TABLET, FILM COATED ORAL
Status: COMPLETED | OUTPATIENT
Start: 2025-04-08 | End: 2025-04-10

## 2025-04-08 RX ORDER — LOPERAMIDE HYDROCHLORIDE 2 MG/1
4 CAPSULE ORAL
Status: DISCONTINUED | OUTPATIENT
Start: 2025-04-08 | End: 2025-04-10 | Stop reason: HOSPADM

## 2025-04-08 RX ORDER — HYDROXYZINE HYDROCHLORIDE 50 MG/1
50 TABLET, FILM COATED ORAL
Status: DISCONTINUED | OUTPATIENT
Start: 2025-04-08 | End: 2025-04-09

## 2025-04-08 RX ORDER — LOPERAMIDE HYDROCHLORIDE 2 MG/1
2 CAPSULE ORAL
Status: DISCONTINUED | OUTPATIENT
Start: 2025-04-08 | End: 2025-04-10 | Stop reason: HOSPADM

## 2025-04-08 RX ORDER — LIDOCAINE 40 MG/G
CREAM TOPICAL
Status: DISCONTINUED | OUTPATIENT
Start: 2025-04-08 | End: 2025-04-10

## 2025-04-08 RX ORDER — PROCHLORPERAZINE MALEATE 10 MG
10 TABLET ORAL EVERY 6 HOURS PRN
Status: DISCONTINUED | OUTPATIENT
Start: 2025-04-08 | End: 2025-04-10 | Stop reason: HOSPADM

## 2025-04-08 RX ORDER — ALBUTEROL SULFATE 90 UG/1
2 INHALANT RESPIRATORY (INHALATION) EVERY 6 HOURS PRN
Status: DISCONTINUED | OUTPATIENT
Start: 2025-04-08 | End: 2025-04-10

## 2025-04-08 RX ADMIN — ACETAMINOPHEN 975 MG: 325 TABLET, FILM COATED ORAL at 21:20

## 2025-04-08 ASSESSMENT — ACTIVITIES OF DAILY LIVING (ADL)
CONCENTRATING,_REMEMBERING_OR_MAKING_DECISIONS_DIFFICULTY: NO
CHANGE_IN_FUNCTIONAL_STATUS_SINCE_ONSET_OF_CURRENT_ILLNESS/INJURY: NO
DIFFICULTY_COMMUNICATING: NO
FALL_HISTORY_WITHIN_LAST_SIX_MONTHS: YES
ADLS_ACUITY_SCORE: 22
DOING_ERRANDS_INDEPENDENTLY_DIFFICULTY: NO
ADLS_ACUITY_SCORE: 42
DRESSING/BATHING_DIFFICULTY: NO
VISION_MANAGEMENT: GLASSES ON
HEARING_DIFFICULTY_OR_DEAF: NO
TOILETING_ISSUES: NO
DIFFICULTY_EATING/SWALLOWING: NO
WEAR_GLASSES_OR_BLIND: YES
NUMBER_OF_TIMES_PATIENT_HAS_FALLEN_WITHIN_LAST_SIX_MONTHS: 1
WALKING_OR_CLIMBING_STAIRS_DIFFICULTY: NO
ADLS_ACUITY_SCORE: 22

## 2025-04-08 NOTE — PROGRESS NOTES
39w1d  Regina is here for prenatal care with her partner and son. She continues to have episodes of headaches frequently, sometimes accompanied by spots/stars and hot flashes. She is asking about induction of labor. Discussed that since she has had a  section in the past, we don't have as many tools for induction of labor due to the uterine scar. SVE today is /-2/post/med, [place] 5. Discussed that a cervical balloon would be possible given her current SVE, and if she would like to try induction of labor, that would be a possibility. Best chance at  is spontaneous labor. However, given her SVE and history of laboring to 9cm, it could be effective. Planning to discuss with her family and support people, and get back to the team later today.  Vasiliy Monroy CNM

## 2025-04-09 LAB — T PALLIDUM AB SER QL: NONREACTIVE

## 2025-04-09 PROCEDURE — 3E0R3BZ INTRODUCTION OF ANESTHETIC AGENT INTO SPINAL CANAL, PERCUTANEOUS APPROACH: ICD-10-PCS | Performed by: STUDENT IN AN ORGANIZED HEALTH CARE EDUCATION/TRAINING PROGRAM

## 2025-04-09 PROCEDURE — 250N000013 HC RX MED GY IP 250 OP 250 PS 637: Performed by: ADVANCED PRACTICE MIDWIFE

## 2025-04-09 PROCEDURE — 3E033VJ INTRODUCTION OF OTHER HORMONE INTO PERIPHERAL VEIN, PERCUTANEOUS APPROACH: ICD-10-PCS | Performed by: ADVANCED PRACTICE MIDWIFE

## 2025-04-09 PROCEDURE — 258N000003 HC RX IP 258 OP 636: Performed by: STUDENT IN AN ORGANIZED HEALTH CARE EDUCATION/TRAINING PROGRAM

## 2025-04-09 PROCEDURE — 250N000011 HC RX IP 250 OP 636: Mod: JZ | Performed by: ADVANCED PRACTICE MIDWIFE

## 2025-04-09 PROCEDURE — 120N000002 HC R&B MED SURG/OB UMMC

## 2025-04-09 PROCEDURE — 250N000011 HC RX IP 250 OP 636: Mod: JZ | Performed by: STUDENT IN AN ORGANIZED HEALTH CARE EDUCATION/TRAINING PROGRAM

## 2025-04-09 PROCEDURE — 258N000003 HC RX IP 258 OP 636: Performed by: ADVANCED PRACTICE MIDWIFE

## 2025-04-09 PROCEDURE — 250N000009 HC RX 250: Performed by: ADVANCED PRACTICE MIDWIFE

## 2025-04-09 PROCEDURE — 10907ZC DRAINAGE OF AMNIOTIC FLUID, THERAPEUTIC FROM PRODUCTS OF CONCEPTION, VIA NATURAL OR ARTIFICIAL OPENING: ICD-10-PCS | Performed by: ADVANCED PRACTICE MIDWIFE

## 2025-04-09 PROCEDURE — 00HU33Z INSERTION OF INFUSION DEVICE INTO SPINAL CANAL, PERCUTANEOUS APPROACH: ICD-10-PCS | Performed by: STUDENT IN AN ORGANIZED HEALTH CARE EDUCATION/TRAINING PROGRAM

## 2025-04-09 RX ORDER — FENTANYL/ROPIVACAINE/NS/PF 2MCG/ML-.1
PLASTIC BAG, INJECTION (ML) EPIDURAL
Status: DISCONTINUED | OUTPATIENT
Start: 2025-04-09 | End: 2025-04-10 | Stop reason: HOSPADM

## 2025-04-09 RX ORDER — FENTANYL CITRATE-0.9 % NACL/PF 10 MCG/ML
PLASTIC BAG, INJECTION (ML) INTRAVENOUS
Status: DISCONTINUED
Start: 2025-04-09 | End: 2025-04-10 | Stop reason: HOSPADM

## 2025-04-09 RX ORDER — NALOXONE HYDROCHLORIDE 0.4 MG/ML
0.2 INJECTION, SOLUTION INTRAMUSCULAR; INTRAVENOUS; SUBCUTANEOUS
Status: DISCONTINUED | OUTPATIENT
Start: 2025-04-09 | End: 2025-04-10

## 2025-04-09 RX ORDER — SODIUM CHLORIDE, SODIUM LACTATE, POTASSIUM CHLORIDE, CALCIUM CHLORIDE 600; 310; 30; 20 MG/100ML; MG/100ML; MG/100ML; MG/100ML
INJECTION, SOLUTION INTRAVENOUS CONTINUOUS PRN
Status: DISCONTINUED | OUTPATIENT
Start: 2025-04-09 | End: 2025-04-09

## 2025-04-09 RX ORDER — OXYTOCIN 10 [USP'U]/ML
INJECTION, SOLUTION INTRAMUSCULAR; INTRAVENOUS
Status: DISCONTINUED
Start: 2025-04-09 | End: 2025-04-09 | Stop reason: HOSPADM

## 2025-04-09 RX ORDER — ACETAMINOPHEN 325 MG/1
650 TABLET ORAL EVERY 4 HOURS PRN
Status: DISCONTINUED | OUTPATIENT
Start: 2025-04-09 | End: 2025-04-10

## 2025-04-09 RX ORDER — OXYTOCIN/0.9 % SODIUM CHLORIDE 30/500 ML
1-24 PLASTIC BAG, INJECTION (ML) INTRAVENOUS CONTINUOUS
Status: DISCONTINUED | OUTPATIENT
Start: 2025-04-09 | End: 2025-04-10

## 2025-04-09 RX ORDER — NALOXONE HYDROCHLORIDE 0.4 MG/ML
0.4 INJECTION, SOLUTION INTRAMUSCULAR; INTRAVENOUS; SUBCUTANEOUS
Status: DISCONTINUED | OUTPATIENT
Start: 2025-04-09 | End: 2025-04-10

## 2025-04-09 RX ORDER — FENTANYL/ROPIVACAINE/NS/PF 2MCG/ML-.1
PLASTIC BAG, INJECTION (ML) EPIDURAL
Status: COMPLETED
Start: 2025-04-09 | End: 2025-04-09

## 2025-04-09 RX ORDER — MISOPROSTOL 200 UG/1
TABLET ORAL
Status: DISCONTINUED
Start: 2025-04-09 | End: 2025-04-09 | Stop reason: HOSPADM

## 2025-04-09 RX ORDER — LIDOCAINE HYDROCHLORIDE 10 MG/ML
INJECTION, SOLUTION EPIDURAL; INFILTRATION; INTRACAUDAL; PERINEURAL
Status: DISCONTINUED
Start: 2025-04-09 | End: 2025-04-09 | Stop reason: HOSPADM

## 2025-04-09 RX ORDER — LIDOCAINE HYDROCHLORIDE AND EPINEPHRINE 15; 5 MG/ML; UG/ML
3 INJECTION, SOLUTION EPIDURAL
Status: DISCONTINUED | OUTPATIENT
Start: 2025-04-09 | End: 2025-04-10 | Stop reason: HOSPADM

## 2025-04-09 RX ORDER — NALBUPHINE HYDROCHLORIDE 10 MG/ML
2.5-5 INJECTION INTRAMUSCULAR; INTRAVENOUS; SUBCUTANEOUS EVERY 6 HOURS PRN
Status: DISCONTINUED | OUTPATIENT
Start: 2025-04-09 | End: 2025-04-10

## 2025-04-09 RX ORDER — OXYTOCIN/0.9 % SODIUM CHLORIDE 30/500 ML
PLASTIC BAG, INJECTION (ML) INTRAVENOUS
Status: DISCONTINUED
Start: 2025-04-09 | End: 2025-04-09 | Stop reason: HOSPADM

## 2025-04-09 RX ORDER — FENTANYL CITRATE-0.9 % NACL/PF 10 MCG/ML
100 PLASTIC BAG, INJECTION (ML) INTRAVENOUS EVERY 5 MIN PRN
Status: DISCONTINUED | OUTPATIENT
Start: 2025-04-09 | End: 2025-04-10 | Stop reason: HOSPADM

## 2025-04-09 RX ORDER — CALCIUM CARBONATE 500 MG/1
1000 TABLET, CHEWABLE ORAL 3 TIMES DAILY PRN
Status: DISCONTINUED | OUTPATIENT
Start: 2025-04-09 | End: 2025-04-10

## 2025-04-09 RX ADMIN — SODIUM CHLORIDE, SODIUM LACTATE, POTASSIUM CHLORIDE, AND CALCIUM CHLORIDE: .6; .31; .03; .02 INJECTION, SOLUTION INTRAVENOUS at 18:11

## 2025-04-09 RX ADMIN — Medication: at 18:07

## 2025-04-09 RX ADMIN — ACETAMINOPHEN 650 MG: 325 TABLET, FILM COATED ORAL at 22:56

## 2025-04-09 RX ADMIN — ACETAMINOPHEN 975 MG: 325 TABLET, FILM COATED ORAL at 06:33

## 2025-04-09 RX ADMIN — ONDANSETRON 4 MG: 2 INJECTION INTRAMUSCULAR; INTRAVENOUS at 16:43

## 2025-04-09 RX ADMIN — CALCIUM CARBONATE (ANTACID) CHEW TAB 500 MG 1000 MG: 500 CHEW TAB at 21:22

## 2025-04-09 RX ADMIN — OMEPRAZOLE 20 MG: 20 CAPSULE, DELAYED RELEASE ORAL at 20:06

## 2025-04-09 RX ADMIN — SODIUM CHLORIDE, SODIUM LACTATE, POTASSIUM CHLORIDE, AND CALCIUM CHLORIDE: .6; .31; .03; .02 INJECTION, SOLUTION INTRAVENOUS at 07:33

## 2025-04-09 RX ADMIN — Medication 2 MILLI-UNITS/MIN: at 07:34

## 2025-04-09 RX ADMIN — METOCLOPRAMIDE 10 MG: 5 INJECTION, SOLUTION INTRAMUSCULAR; INTRAVENOUS at 06:59

## 2025-04-09 RX ADMIN — BUPIVACAINE HYDROCHLORIDE 8 ML: 2.5 INJECTION, SOLUTION EPIDURAL; INFILTRATION; INTRACAUDAL; PERINEURAL at 18:16

## 2025-04-09 RX ADMIN — CALCIUM CARBONATE (ANTACID) CHEW TAB 500 MG 1000 MG: 500 CHEW TAB at 18:53

## 2025-04-09 ASSESSMENT — ACTIVITIES OF DAILY LIVING (ADL)
ADLS_ACUITY_SCORE: 19

## 2025-04-09 ASSESSMENT — LIFESTYLE VARIABLES: TOBACCO_USE: 0

## 2025-04-09 NOTE — PROGRESS NOTES
Subjective :   Starting to breathe with a few more contractions, has been up and around and trying different positions.  States she is feeling more pain deep low in her belly than back pain which she feels is positive     Objective:   /65   Temp 98  F (36.7  C) (Oral)   Resp 16   LMP 2024   Breastfeeding No   SVE: deferred  FHTs 125 moderate variability, + accels, still having rare variables   Cxt q 2-3, lasting 90  Membranes intact  pitocin @ 16 mu     Labor Summary   25 Admit for induction of labor  -SVE /-2/med/post; Sal[place] 5; Cook catheter placed.     25  0630-Cook cath out  0639-SVE 80/-2/med/post; [place] 7  0730-Pitocin start   1045-SVE /-2     Assessment:   IUP @ 39w2d  Elective IOL for maternal distress   Cat 1 FHT tracing   TOLAC  Membranes  intact,   GBS negative      Plan:  Continue to labor  Continue to observe and offer comfort  Discussed checking cervix, was going to offer AROM again and discuss as next best tool for advancing labor.  Pt states her contractions seem to be getting stronger so she would like to wait on the cervical exam.  Discussed with nursing that at this time I would not increase the pitocin dosage secondary to her TOLAC status without and IUPC   Anticipate        JORGE Hernandez CNM

## 2025-04-09 NOTE — PLAN OF CARE
Patient able to rest in between cares. coping with labor by movement; position changes, and breathing through contractions. Mechanical induction catheter out. SVE 4/80/-2. Denies LOF, bleeding, S/S of pre-eclampsia. Tylenol and Reglan administered for C/O head ache and feeling nauseous. . Patient with relief of after first dose. Reported to Lynnette YOUSIF RN and transferred care.

## 2025-04-09 NOTE — PLAN OF CARE
Goal Outcome Evaluation:      Plan of Care Reviewed With: patient    Overall Patient Progress: no changeOverall Patient Progress: no change         Pt coping well with labor. Not needing any pain medication at this time. Sammi Morin CNM here to perform SVE. Report given to Chantelle Lepe RN at 1505.

## 2025-04-09 NOTE — PROGRESS NOTES
Subjective :   Pt sitting up in bed, states she can feel her contractions more, but talking through them well.  States she is think epidural for her labor.  Mostly she would like to be able to welcome her baby in a calm environment     Objective:   /68   Temp 98.3  F (36.8  C)   LMP 2024   Breastfeeding No   SVE: deferred  FHTs 1454 moderate variability, + accels, non repetitive variable decel kishore of 80 @ 0906, accel noted @ 0917   Cxt q 3-4, lasting 60-90 secs   Membranes intact  pitocin @ 2 mu     Labor Summary   25 Admit for induction of labor  -SVE /-2/med/post; [place] 5; Cook catheter placed.     25  0630-Cook cath out  0639-SVE /-2/med/post; [place] 7  0730-Pitocin start     Assessment:   IUP @ 39w2d  Elective IOL for maternal discomfort   TOLAC   Cat 1 FHT tracing   Membranes  intact,   GBS negative      Plan:  Continue to labor  Continue to observe and offer comfort  Plans epidural for pain relief in labor   Discussed option of AROM at some point, may plan another cervical exam in 1-2 hours   Anticipate        JORGE HernandezM

## 2025-04-09 NOTE — PROGRESS NOTES
Subjective :   up and around in room, hands a knees, breathing with contractions, has starting feeling a bit nauseated and weepy    Objective:   /65   Temp 98  F (36.7  C) (Oral)   Resp 16   LMP 2024   SpO2 97%   Breastfeeding No   SVE: 5 80% -2, more anterior and bag of water bulging behind cervix   FHTs 120 moderate variability, + accels  Cxt q 2-3, lasting 90  Membranes intact  pitocin @ 16 mu    Labor Summary   25 Admit for induction of labor  -SVE /70/-2/med/post; [place] 5; Cook catheter placed.     25  0630-Cook cath out  0639-SVE /80/-2/med/post; [place] 7  0730-Pitocin start   1045-SVE 80/-2   1700-SVE 5/80/-2     Assessment:   IUP @ 39w2d  Elective IOL for maternal misery   Reaching active labor now   TOLAC   Cat 1 FHT tracing   Membranes  intact,   GBS negative        Plan:  Continue to labor  Continue to observe and offer comfort  Up and around in different positions, will ask when she wants an epidural   Anticipate        Kellie Morin, JORGE GALAVIZM

## 2025-04-09 NOTE — PROGRESS NOTES
S:Regina is feeling more cramping. Still feeling pain in her low back, but also feeling some cramping in her lower belly now. She is resting.    O:  Blood pressure 121/68, temperature 98.6  F (37  C), last menstrual period 2024, not currently breastfeeding.  Patient Vitals for the past 24 hrs:   BP Temp   25 2120 -- 98.6  F (37  C)   25 2100 121/68 --       General appearance: comfortable    CONTACTIONS: Contractions every 3-4 minutes.  Palpate: moderate  FETAL HEART TONES: baseline 135 with moderate FHR variability and    accelerations yes. Decelerations no.      ROM: not ruptured  PELVIC EXAM:deferred; Cook catheter still firmly in place  Fetal Position:  cephalic  Bloody show: No  Pitocin- none,  Antibiotics- none  Cervical ripening: cook catheter    LABOR PROGRESS  25 Admit for induction of labor  -SVE /-2/med/post; Sal[place] 5; Cook catheter placed.    ASSESSMENT:  ==============  IUP @ 39w2d elective induction of labor    TOLAC  Fetal Heart rate tracing category one  GBS- negative  Clinically Significant Risk Factors Present on Admission : none    PLAN:  ===========  comfort measures prn   cervical ripening with cook catheter  Pain medication per patient request  Anticipate   reevaluate in 4-6 hours/PRN     Medically Ready for Discharge: Anticipated in 2-4 Days      Vasiliy Monroy CNM

## 2025-04-09 NOTE — PLAN OF CARE
Patient admitted to room 481 at 2019 for elective IOL. Patient is a . Prenatal record reviewed. 39w1d Vital signs per doc flowsheet. Fetal movement present. Support person Spouse present. ,     Verbal consent for EFM, external fetal monitors applied. VSS, afebrile. Given tylenol for Head Ache. No blurry vision. Patient reports no contractions, vaginal leaking of fluid or bleeding, SOB or abdominal pain on admission.     Patient instructed to report change in fetal movement N/V, RUQ pain, SOB, chest pain, fever/chills, vaginal bleeding or leaking of fluid. See FS for EFM interpretation. GENNA Monroy informed of patient arrival. Provider discussed induction process and agreed for cook placement. Cook inserted at  and inflated with 80 ML uterine and 80 ML vaginal balloon. Patient coping with labor with position changes.

## 2025-04-09 NOTE — PROGRESS NOTES
Subjective :   Feeling much relief since epidural, reports feeling some 'pressure in the downstairs area' with her bigger contractions     Objective:   /65   Temp 98  F (36.7  C) (Oral)   Resp 20   LMP 2024   SpO2 100%   Breastfeeding No   SVE: deferred  FHTs 120 moderate variability, + multiple accels throughout, non repetitive random variables? Not traced all the way down kishore of 90 @ 1728, 1734 and 1756  Cxt q 2-3, lasting 60-90 secs   Membranes intact  pitocin @ 16 mu     Labor Summary   25 Admit for induction of labor  -SVE 1/70/-2/med/post; Sal[place] 5; Cook catheter placed.     25  0630-Cook cath out  0639-SVE 4/80/-2/med/post; [place] 7  0730-Pitocin start   1045-SVE 4/80/-2   1700-SVE 5/80/-2   1814-epidural placed      Assessment:   IUP @ 39w2d  Elective IOL for maternal misery   Active labor   TOLAC   Cat 1 FHT tracing   Membranes  intact,   GBS negative           Plan:  Continue to labor with pitocin and epidural   Continue to observe and offer comfort  Anticipate      JORGE HernandezM

## 2025-04-09 NOTE — PROGRESS NOTES
Cervical Mechanical Ripening Device Placement    Regina is a 30yo  here for elective induction of labor for increasing frequency of headaches and shortness of breath. She has a history of STAT  section for FHT in  and desires TOLAC. She consents to Cook catheter placement for cervical ripening.    Regina was placed in dorsal position and SVE done. Cook catheter introduced through cervical os until uterine balloon was entirely inside the cervix. Balloon was inflated with 50mL of sterile water. Confirmed uterine balloon was in place, and then 50mL instilled in vaginal balloon. Regina was tolerating well, so an additional 30mL of sterile water was instilled into both uterine and vaginal balloons for a total of 80mL in each balloon.     Regina tolerated the procedure well. FHT remained category 1 at the completion of the procedure.    Vasiliy Monroy CNM
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
## 2025-04-09 NOTE — PROVIDER NOTIFICATION
04/09/25 1400   Provider Notification   Provider Name/Title Mikel   Method of Notification Electronic Page   Notification Reason Status Update     Pitocin up to 16mu. Contractions q 2-3min. Pt has strong contractions and mild ones. Coping well. Updated Sammi Morin CNM of pt status. Will come evaluate pt soon. Continue close monitoring.

## 2025-04-09 NOTE — ANESTHESIA PROCEDURE NOTES
Epidural catheter Procedure Note    Pre-Procedure   Staff -        Anesthesiologist:  Karen Flores MD       Resident/Fellow: Meron Carlisle MD       Performed By: resident       Location: OB       Procedure Start/Stop Times: 4/9/2025 6:14 PM       Pre-Anesthestic Checklist: patient identified, IV checked, risks and benefits discussed, informed consent, monitors and equipment checked, pre-op evaluation, at physician/surgeon's request and post-op pain management  Timeout:       Correct Patient: Yes        Correct Procedure: Yes        Correct Site: Yes        Correct Position: Yes   Procedure Documentation  Procedure: epidural catheter         Diagnosis: labor pain       Patient Position: sitting       Patient Prep/Sterile Barriers: sterile gloves, mask, patient draped       Skin prep: Chloraprep       Local skin infiltrated with mL of 1% lidocaine.        Insertion Site: L3-4. (midline approach).       Technique: LORT saline        AMANDA at 4.5 cm.       Needle Type: [employer] needle       Needle Gauge: 17.        Needle Length (Inches): 3.5        Catheter: 19 G.          Catheter threaded easily.           Threaded 10 cm at skin.         # of attempts: 2 and  # of redirects:  1    Assessment/Narrative         Paresthesias: No.       Test dose of 3 mL lidocaine 1.5% w/ 1:200,000 epinephrine at.         Test dose negative, 3 minutes after injection, for signs of intravascular, subdural, or intrathecal injection.       Insertion/Infusion Method: LORT saline       Aspiration negative for Heme or CSF via Epidural Catheter.    Medication(s) Administered   0.125% bupivacaine 5 mL + fentanyl 20 mcg + NS 5 mL (Epidural) (Mixture components: BUPivacaine HCl (PF) 0.25 % Soln, 5 mL; fentaNYL (PF) 100 MCG/2ML Soln, 20 mcg; sodium chloride 0.9 % Soln, 5 mL) - EPIDURAL   8 mL - 4/9/2025 6:16:00 PM   Comments:  1 attempt with several redirects at L3-4, unable to advance though bone.   Another attempt one interspace above,  "successful on single attempt with no redirects.       FOR [facility] ([facility]) ONLY:   Pain Team Contact information: please page the Pain Team [employer]. Search \"Pain\". During daytime hours, please page the attending first. At night please page the resident first.      "

## 2025-04-09 NOTE — PROGRESS NOTES
Subjective :   Pt states she is having a few painful contractions     Objective:   /56   Temp 98.3  F (36.8  C)   Resp 16   LMP 2024   Breastfeeding No   SVE: 4 80% -2, posterior   FHTs 145 moderate variability, variable decel at 1023, kishore of 90  Cxt q 4, lasting 90  Membranes intact  pitocin @ 8 mu     Labor Summary   25 Admit for induction of labor  -SVE /-2/med/post; [place] 5; Cook catheter placed.     25  0630-Cook cath out  0639-SVE 80/-2/med/post; [place] 7  0730-Pitocin start   1045-SVE /-2       Assessment:   IUP @ 39w2d  Elective IOL for maternal discomfort   TOLAC  Cat 1 FHT tracing overall, rare random variable   Membranes  intact,   GBS negative      Plan:  Continue to labor  Continue to observe and offer comfort  Discussed AROM, but secondary to rare variable and the reason for her c/s last time was fetal intolerance to labor then will wait until more active   Anticipate        Kellie Morin, APRN CNM
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
## 2025-04-09 NOTE — PROGRESS NOTES
S:Regina was able to get some rest overnight.  Her headache is pretty significant this morning. She got up to use the bathroom this morning and her Cood cath came out. She is agreeable to SVE to assess labor progress and make a plan for next steps.    O:  Blood pressure 121/68, temperature 98.3  F (36.8  C), last menstrual period 2024, not currently breastfeeding.  Patient Vitals for the past 24 hrs:   BP Temp   25 0633 -- 98.3  F (36.8  C)   25 2120 -- 98.6  F (37  C)   25 2100 121/68 --       General appearance: comfortable    CONTACTIONS: Contractions every 3-7 minutes.  Palpate: mild  FETAL HEART TONES: baseline 130 with moderate FHR variability and    accelerations yes. Decelerations no.      ROM: not ruptured  PELVIC EXAM:PELVIC EXAM: 4 80/ Posterior/ average/ -2 [place] 7  Fetal Position:  cephalic   Bloody show: No  Pitocin- none,  Antibiotics- none  Cervical ripening: s/p cook catheter    LABOR PROGRESS  25 Admit for induction of labor  -SVE 1/70/-2/med/post; [place] 5; Cook catheter placed.    25  0630-Cook cath out  0639-SVE 4/80/-2/med/post; [place] 7    ASSESSMENT:  ==============  IUP @ 39w2d early labor   Elective induction of labor  TOLAC  Fetal Heart rate tracing category one  GBS- negative    PLAN:  ===========  comfort measures prn   Pain medication per patient request  Anticipate   Labor induction with Pitocin  reevaluate in 2-4 hours/PRN  Add IV Reglan for headache     Medically Ready for Discharge: Anticipated in 2-4 Days      Vasiliy Monroy CNM

## 2025-04-10 VITALS
SYSTOLIC BLOOD PRESSURE: 114 MMHG | OXYGEN SATURATION: 98 % | HEART RATE: 70 BPM | DIASTOLIC BLOOD PRESSURE: 70 MMHG | TEMPERATURE: 98.6 F | RESPIRATION RATE: 16 BRPM

## 2025-04-10 LAB
HGB BLD-MCNC: 11.1 G/DL (ref 11.7–15.7)
HOLD SPECIMEN: NORMAL

## 2025-04-10 PROCEDURE — 85018 HEMOGLOBIN: CPT | Performed by: ADVANCED PRACTICE MIDWIFE

## 2025-04-10 PROCEDURE — 0UQMXZZ REPAIR VULVA, EXTERNAL APPROACH: ICD-10-PCS | Performed by: ADVANCED PRACTICE MIDWIFE

## 2025-04-10 PROCEDURE — 0HQ9XZZ REPAIR PERINEUM SKIN, EXTERNAL APPROACH: ICD-10-PCS | Performed by: ADVANCED PRACTICE MIDWIFE

## 2025-04-10 PROCEDURE — 120N000003 HC R&B IMCU UMMC

## 2025-04-10 PROCEDURE — 258N000003 HC RX IP 258 OP 636: Performed by: STUDENT IN AN ORGANIZED HEALTH CARE EDUCATION/TRAINING PROGRAM

## 2025-04-10 PROCEDURE — 250N000009 HC RX 250: Mod: JW | Performed by: ADVANCED PRACTICE MIDWIFE

## 2025-04-10 PROCEDURE — 722N000001 HC LABOR CARE VAGINAL DELIVERY SINGLE

## 2025-04-10 PROCEDURE — 36415 COLL VENOUS BLD VENIPUNCTURE: CPT | Performed by: ADVANCED PRACTICE MIDWIFE

## 2025-04-10 PROCEDURE — 250N000011 HC RX IP 250 OP 636: Mod: JZ | Performed by: ADVANCED PRACTICE MIDWIFE

## 2025-04-10 PROCEDURE — 250N000013 HC RX MED GY IP 250 OP 250 PS 637: Performed by: ADVANCED PRACTICE MIDWIFE

## 2025-04-10 RX ORDER — MISOPROSTOL 200 UG/1
400 TABLET ORAL
Status: DISCONTINUED | OUTPATIENT
Start: 2025-04-10 | End: 2025-04-11 | Stop reason: HOSPADM

## 2025-04-10 RX ORDER — OXYTOCIN/0.9 % SODIUM CHLORIDE 30/500 ML
340 PLASTIC BAG, INJECTION (ML) INTRAVENOUS CONTINUOUS PRN
Status: DISCONTINUED | OUTPATIENT
Start: 2025-04-10 | End: 2025-04-11 | Stop reason: HOSPADM

## 2025-04-10 RX ORDER — AMOXICILLIN 250 MG
2 CAPSULE ORAL
Status: DISCONTINUED | OUTPATIENT
Start: 2025-04-10 | End: 2025-04-11 | Stop reason: HOSPADM

## 2025-04-10 RX ORDER — BISACODYL 10 MG
10 SUPPOSITORY, RECTAL RECTAL DAILY PRN
Status: DISCONTINUED | OUTPATIENT
Start: 2025-04-10 | End: 2025-04-11 | Stop reason: HOSPADM

## 2025-04-10 RX ORDER — TRANEXAMIC ACID 10 MG/ML
1 INJECTION, SOLUTION INTRAVENOUS EVERY 30 MIN PRN
Status: DISCONTINUED | OUTPATIENT
Start: 2025-04-10 | End: 2025-04-11 | Stop reason: HOSPADM

## 2025-04-10 RX ORDER — OXYTOCIN 10 [USP'U]/ML
10 INJECTION, SOLUTION INTRAMUSCULAR; INTRAVENOUS
Status: DISCONTINUED | OUTPATIENT
Start: 2025-04-10 | End: 2025-04-11 | Stop reason: HOSPADM

## 2025-04-10 RX ORDER — MISOPROSTOL 200 UG/1
800 TABLET ORAL
Status: DISCONTINUED | OUTPATIENT
Start: 2025-04-10 | End: 2025-04-11 | Stop reason: HOSPADM

## 2025-04-10 RX ORDER — ACETAMINOPHEN 325 MG/1
650 TABLET ORAL EVERY 6 HOURS PRN
COMMUNITY
Start: 2025-04-10

## 2025-04-10 RX ORDER — ACETAMINOPHEN 325 MG/1
650 TABLET ORAL EVERY 4 HOURS PRN
Status: DISCONTINUED | OUTPATIENT
Start: 2025-04-10 | End: 2025-04-11 | Stop reason: HOSPADM

## 2025-04-10 RX ORDER — POLYETHYLENE GLYCOL 3350 17 G/17G
17 POWDER, FOR SOLUTION ORAL DAILY PRN
Status: DISCONTINUED | OUTPATIENT
Start: 2025-04-10 | End: 2025-04-11 | Stop reason: HOSPADM

## 2025-04-10 RX ORDER — IBUPROFEN 600 MG/1
600 TABLET, FILM COATED ORAL EVERY 6 HOURS PRN
COMMUNITY
Start: 2025-04-10

## 2025-04-10 RX ORDER — LOPERAMIDE HYDROCHLORIDE 2 MG/1
4 CAPSULE ORAL
Status: DISCONTINUED | OUTPATIENT
Start: 2025-04-10 | End: 2025-04-11 | Stop reason: HOSPADM

## 2025-04-10 RX ORDER — CARBOPROST TROMETHAMINE 250 UG/ML
250 INJECTION, SOLUTION INTRAMUSCULAR
Status: DISCONTINUED | OUTPATIENT
Start: 2025-04-10 | End: 2025-04-11 | Stop reason: HOSPADM

## 2025-04-10 RX ORDER — HYDROCORTISONE 25 MG/G
CREAM TOPICAL 3 TIMES DAILY PRN
Status: DISCONTINUED | OUTPATIENT
Start: 2025-04-10 | End: 2025-04-11 | Stop reason: HOSPADM

## 2025-04-10 RX ORDER — LOPERAMIDE HYDROCHLORIDE 2 MG/1
2 CAPSULE ORAL
Status: DISCONTINUED | OUTPATIENT
Start: 2025-04-10 | End: 2025-04-11 | Stop reason: HOSPADM

## 2025-04-10 RX ORDER — IBUPROFEN 800 MG/1
800 TABLET, FILM COATED ORAL EVERY 6 HOURS PRN
Status: DISCONTINUED | OUTPATIENT
Start: 2025-04-10 | End: 2025-04-11 | Stop reason: HOSPADM

## 2025-04-10 RX ORDER — AMOXICILLIN 250 MG
1 CAPSULE ORAL DAILY
COMMUNITY
Start: 2025-04-10

## 2025-04-10 RX ORDER — METHYLERGONOVINE MALEATE 0.2 MG/ML
200 INJECTION INTRAVENOUS
Status: DISCONTINUED | OUTPATIENT
Start: 2025-04-10 | End: 2025-04-11 | Stop reason: HOSPADM

## 2025-04-10 RX ADMIN — ACETAMINOPHEN 650 MG: 325 TABLET, FILM COATED ORAL at 15:53

## 2025-04-10 RX ADMIN — LIDOCAINE HYDROCHLORIDE 20 ML: 10 INJECTION, SOLUTION EPIDURAL; INFILTRATION; INTRACAUDAL; PERINEURAL at 00:59

## 2025-04-10 RX ADMIN — ACETAMINOPHEN 650 MG: 325 TABLET, FILM COATED ORAL at 07:47

## 2025-04-10 RX ADMIN — SODIUM CHLORIDE, SODIUM LACTATE, POTASSIUM CHLORIDE, AND CALCIUM CHLORIDE 250 ML: .6; .31; .03; .02 INJECTION, SOLUTION INTRAVENOUS at 00:15

## 2025-04-10 RX ADMIN — PSYLLIUM HUSK 1 PACKET: 3.4 POWDER ORAL at 12:15

## 2025-04-10 RX ADMIN — ACETAMINOPHEN 650 MG: 325 TABLET, FILM COATED ORAL at 12:19

## 2025-04-10 RX ADMIN — ACETAMINOPHEN 650 MG: 325 TABLET, FILM COATED ORAL at 03:15

## 2025-04-10 RX ADMIN — ACETAMINOPHEN 650 MG: 325 TABLET, FILM COATED ORAL at 21:03

## 2025-04-10 RX ADMIN — BENZOCAINE AND LEVOMENTHOL: 200; 5 SPRAY TOPICAL at 12:19

## 2025-04-10 RX ADMIN — KETOROLAC TROMETHAMINE 15 MG: 15 INJECTION, SOLUTION INTRAMUSCULAR; INTRAVENOUS at 02:26

## 2025-04-10 RX ADMIN — IBUPROFEN 800 MG: 800 TABLET, FILM COATED ORAL at 19:07

## 2025-04-10 RX ADMIN — IBUPROFEN 800 MG: 800 TABLET, FILM COATED ORAL at 10:45

## 2025-04-10 ASSESSMENT — ACTIVITIES OF DAILY LIVING (ADL)
ADLS_ACUITY_SCORE: 19
ADLS_ACUITY_SCORE: 21
ADLS_ACUITY_SCORE: 19
ADLS_ACUITY_SCORE: 21
ADLS_ACUITY_SCORE: 19
ADLS_ACUITY_SCORE: 21
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
## 2025-04-10 NOTE — PROGRESS NOTES
Pt had  of viable female infant with vigorous cry. LUIS Wing CNM in attendance. Placed on pt's abdomen and delayed cord clamping performed. IV Pitocin given following delivery of baby. Placenta delivered on own. .  1st degree laceration observed and repaired. SO and friend at bedside and supportive. Toradol for pain control following.
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
## 2025-04-10 NOTE — L&D DELIVERY NOTE
OB Vaginal Delivery Note    Regina Mays MRN# [id number]   Age: 29 year old YOB: 1995     Brief labor course:  Regina came for IOL at term r/t worsening HAs and dizziness, episodes of SOB w/ no known etiology despite extensive workups. Her labor was induced with a cook catheter, Pitocin, and AROM. There were intermittent periods of cat 2 FHR tracing that responded to interventions. She progressed quickly after AROM and Pitocin was turned off. She pushed with excellent effort for one hour and delivered a baby girl in the squatting position. The  baby had good tone and spontaneous respirations. Regina was happy to meet her daughter. Several small lacs (1st degree) were repaired. Placenta delivered and was inspected to be intact. As I left the room the family was happily bonding w/ their new daughter.   GA: 39w3d  GP:   Labor Complications: Fetal Intolerance  EBL:   mL  Delivery QBL: 230 mL  Delivery Type: , Spontaneous  ROM to Delivery Time: rupture date or rupture time have not been documented  [place] Weight: 3.01 kg (6 lb 10.2 oz)   1 Minute 5 Minute 10 Minute   Apgar Totals: 8   9        RADHA HAILE;GRAHAM FORTUNE    Delivery Details:  Regina Mays, a 29 year old  female delivered a viable infant with apgars of 8  and 9 . Patient was fully dilated and pushing after 5 hours 34 minutes in active labor. Delivery was via , spontaneous to a sterile field under epidural anesthesia. Infant delivered in vertex   occiput anterior position. Anterior and posterior shoulders delivered without difficulty. The cord was clamped, cut twice and 3 vessels were noted. Cord blood was obtained in routine fashion with the following disposition: lab.      Cord complications: none  Placenta delivered at 4/10/2025 12:49 AM. Placental disposition was Hospital disposal. Fundal massage performed and fundus found to be firm.     Episiotomy: none   Perineum, vagina, cervix were inspected, and  the following lacerations were noted:   Perineal lacerations: 1st  periurethral laceration: bilateral            Any lacerations were repaired in the usual fashion using 4-0.    Excellent hemostasis was noted. Needle count correct. Infant and patient in delivery room in good and stable condition.        Agnieszka Mays [[id number]]      Labor Event Times      Latent labor onset date/time: 2025 1600    Active labor onset date: 25 Onset time:  6:00 PM   Dilation complete date: 25 Complete time: 11:34 PM   Start pushing date/time: 2025 2339          Labor Length      1st Stage (hrs): 5 (min): 34   2nd Stage (hrs): 1 (min): 6   3rd Stage (hrs): 0 (min): 9          Labor Events     labor?: No   steroids: None  Labor Type: Induction/Cervical ripening  Predominate monitoring during 1st stage: continuous electronic fetal monitoring     Antibiotics received during labor?: No       Rupture date/time: 25    Rupture type: Artificial Rupture of Membranes     Induction: Cervical Ripening Balloon, AROM, Oxytocin  Induction date/time:      Cervical ripening date/time:      Indications for induction: Elective     Augmentation: None       Delivery/Placenta Date and Time      Delivery Date: 4/10/25 Delivery Time: 12:40 AM   Placenta Date/Time: 4/10/2025 12:49 AM  Oxytocin given at the time of delivery: after delivery of baby  Delivering clinician: Queenie Wing RN   Other personnel present at delivery:  Provider Role   Sarah Pretty RN Delivery Nurse   Rebecca Faust RN RN Resource             Vaginal Counts       Initial count performed by 2 team members:  Two Team Members   jacques pretty heather         Needles Suture Needles Sponges (RETIRED) Instruments   Initial counts 2  5    Added to count       Relief counts       Final counts               Placed during labor Accounted for at the end of labor   FSE Yes Yes   IUPC NA NA   Cervidil NA NA                              Apgars    Living status: Living   1 Minute 5 Minute 10 Minute 15 Minute 20 Minute   Skin color: 1  1       Heart rate: 2  2       Reflex irritability: 2  2       Muscle tone: 1  2       Respiratory effort: 2  2       Total: 8  9       Apgars assigned by: GRAHAM FORTUNE RN       Cord      Vessels: 3 Vessels    Cord Complications: None               Cord Blood Disposition: Lab    Gases Sent?: No    Delayed cord clamping?: Yes    Cord Clamping Delay (seconds): >120 seconds    Stem cell collection?: No            Resuscitation    Methods: None  [place] Care at Delivery: Female infant born with spontaneous cry, placed on mothers abdomen, dried and stimulated, with delayed cord clamping. Warm blankets and hat applied. Infant in stable condition.  Output in Delivery Room: Voided, Stool       [place] Measurements      Weight: 6 lb 10.2 oz    Output in delivery room: Voided, Stool       Skin to Skin and Feeding Plan      Skin to skin initiation date/time: 1841    Skin to skin with: Mother  Skin to skin end date/time:            Labor Events and Shoulder Dystocia    Fetal Tracing Prior to Delivery: Category 2  Shoulder dystocia present?: Neg       Delivery (Maternal) (Provider to Complete) ([id number])    Episiotomy: None  Perineal lacerations: 1st Repaired?: Yes     Periurethral laceration: bilateral Repaired?: Yes   Repair suture: 4-0 Vicryl  Genital tract inspection done: Pos       Blood Loss  Mother: Regina Mays #[id number]     Start of Mother's Information      Delivery Blood Loss   Intrapartum & Postpartum: 25 1800 - 04/10/25 0126    Delivery Admission: 25 2019 - 04/10/25 0126         Intrapartum & Postpartum Delivery Admission    Delivery QBL (mL) Hospital Encounter 230 mL 230 mL    Total  230 mL 230 mL               End of Mother's Information  Mother: Regina Mays #[id number]                Delivery - Provider to Complete ([id number])    Delivering clinician: Queenie Wing RN  Delivery  Type (Choose the 1 that will go to the Birth History): , Spontaneous                         Other personnel:  Provider Role   Sarah Claros, RN Delivery Nurse   Rebecca Faust RN RN Resource                    Placenta    Date/Time: 4/10/2025 12:49 AM  Removal: Spontaneous  Disposition: Hospital disposal             Anesthesia    Method: Epidural  Cervical dilation at placement: 4-7                    Presentation and Position    Presentation: Vertex     Occiput Anterior                     Queenie Wing CNM, APRN GUILLAUMEM

## 2025-04-10 NOTE — PLAN OF CARE
Provider notification:   Provider: Queenie Wing CNM was notified at 2035 regarding: a persistent Category II fetal heart rate tracing for 20 minutes.    Category II Algorithm     Fetal heart rate and uterine activity reviewed with provider.    EFM interpretation suggests: concern for persistent Category II tracing due to: minimal variability.  EFM suggests concern for interruption of the oxygen pathway due to:  late decelerations.     Interventions to improve fetal oxygenation for a Category II tracing include: Category II algorithm reviewed, continue monitoring, emotional support, evaluate labor progress, IV fluid bolus , maternal positioning, oxytocin decreased, and sterile vaginal exam    After discussion with provider: Provider at bedside during interventions and FHR resoved with interventions mentioned above. Will continuously assess and keep provider abreast of the situation

## 2025-04-10 NOTE — PROGRESS NOTES
Delayed entry due to patient care.   S:Regina is experiencing constant pelvic pain and pressure. Breathing again through the contractions.     O:  Blood pressure 112/58, temperature 99.5  F (37.5  C), temperature source Oral, resp. rate 16, last menstrual period 06/25/2024, SpO2 94%, unknown if currently breastfeeding.  Patient Vitals for the past 24 hrs:   BP Temp Temp src Resp SpO2   04/10/25 0109 112/58 -- -- -- 94 %   04/10/25 0054 124/59 -- -- -- --   04/10/25 0036 134/66 -- -- -- --   04/10/25 0020 -- 99.5  F (37.5  C) Oral 16 --   04/10/25 0006 114/64 -- -- -- --   04/09/25 2336 -- 98.7  F (37.1  C) Oral 16 --   04/09/25 2300 125/62 -- -- -- 98 %   04/09/25 2027 -- -- -- -- 99 %   04/09/25 1930 -- -- -- -- 99 %   04/09/25 1928 126/62 -- -- -- --   04/09/25 1925 -- -- -- -- 99 %   04/09/25 1923 117/68 -- -- -- --   04/09/25 1920 -- -- -- -- 99 %   04/09/25 1919 112/61 -- -- -- --   04/09/25 1915 -- -- -- -- 100 %   04/09/25 1914 113/65 -- -- -- --   04/09/25 1910 -- -- -- -- 100 %   04/09/25 1909 114/69 -- -- -- --   04/09/25 1905 123/58 -- -- -- 100 %   04/09/25 1859 132/67 -- -- -- --   04/09/25 1855 -- -- -- -- 100 %   04/09/25 1854 138/62 -- -- -- --   04/09/25 1850 -- -- -- -- 100 %   04/09/25 1848 113/74 -- -- -- --   04/09/25 1845 -- -- -- -- 100 %   04/09/25 1843 (!) 144/65 -- -- -- --   04/09/25 1841 120/58 -- -- -- 100 %   04/09/25 1836 121/66 -- -- -- --   04/09/25 1826 121/65 -- -- -- --   04/09/25 1825 -- -- -- -- 100 %   04/09/25 1824 108/55 -- -- -- --   04/09/25 1822 112/61 -- -- -- --   04/09/25 1820 107/58 -- -- -- 100 %   04/09/25 1818 107/58 -- -- -- --   04/09/25 1816 112/62 -- -- 20 --   04/09/25 1815 -- -- -- -- 99 %   04/09/25 1814 108/58 -- -- 20 --   04/09/25 1812 109/66 -- -- 18 --   04/09/25 1810 122/59 -- -- 18 100 %   04/09/25 1808 133/60 -- -- 22 --   04/09/25 1806 122/61 -- -- -- --   04/09/25 1610 -- -- -- -- 97 %   04/09/25 1430 119/65 -- -- 16 --   04/09/25 1315 113/61 -- --  16 --   25 1045 112/53 98  F (36.7  C) Oral 16 --   25 1000 107/56 -- -- 16 --   25 0900 108/54 -- -- 16 --   25 0800 103/55 -- -- 16 --   25 0633 -- 98.3  F (36.8  C) -- -- --       General appearance: uncomfortable with contractions    CONTRACTIONS: Contractions every 2-3 minutes.  Palpate: moderate  FETAL HEART TONES: baseline 135 with moderate FHR variability and    accelerations yes. Decelerations yes intermittent variables.    ROM: clear fluid  PELVIC EXAM:PELVIC EXAM: 10/ 100%/ +1   Fetal Position:  cephalic  Bloody show: Yes   Pitocin- none,  Antibiotics- none  Cervical ripening: N/A  ASSESSMENT:  ==============  IUP @ 39w3d second stage labor and good progress   Fetal Heart rate tracing Category category One immediately prior to pushing  GBS- negative     PLAN:  ===========\  Start pushing  Anticipate   reevaluate in 2-4 hours/PRN         Medically Ready for Discharge: Anticipated in 2-4 Days        Queenie Wing, GENNA, APRN GUILLAUMEM, CNM

## 2025-04-10 NOTE — PLAN OF CARE
Vital signs within normal limits. Uterus is firm, at umbilicus, slightly to the R side even after voids. Bleeding is scan. Patient eating and drinking normally. Patient able to empty bladder independently and is up ambulating. No apparent signs of infection. Patient medicated during the shift for cramping. See MAR.

## 2025-04-10 NOTE — PROVIDER NOTIFICATION
04/10/25 0610   Provider Notification   Provider Name/Title NERIS Paul   Method of Notification Electronic Page   Notification Reason Status Update;Other  (FYI Uterus slightly to R even after voids. Bleeding scan. No other concerns. Thank you)

## 2025-04-10 NOTE — PROGRESS NOTES
Assumed care from Sammi Morin at 1900.       S:Regina is taking a nap after getting the epidural. Reviewed FHT and got report from RN.     O:  Blood pressure 132/67, temperature 98  F (36.7  C), temperature source Oral, resp. rate 20, last menstrual period 06/25/2024, SpO2 100%, not currently breastfeeding.  Patient Vitals for the past 24 hrs:   BP Temp Temp src Resp SpO2   04/09/25 1859 132/67 -- -- -- --   04/09/25 1855 -- -- -- -- 100 %   04/09/25 1854 138/62 -- -- -- --   04/09/25 1850 -- -- -- -- 100 %   04/09/25 1848 113/74 -- -- -- --   04/09/25 1845 -- -- -- -- 100 %   04/09/25 1843 (!) 144/65 -- -- -- --   04/09/25 1841 120/58 -- -- -- 100 %   04/09/25 1836 121/66 -- -- -- --   04/09/25 1826 121/65 -- -- -- --   04/09/25 1825 -- -- -- -- 100 %   04/09/25 1824 108/55 -- -- -- --   04/09/25 1822 112/61 -- -- -- --   04/09/25 1820 107/58 -- -- -- 100 %   04/09/25 1818 107/58 -- -- -- --   04/09/25 1816 112/62 -- -- 20 --   04/09/25 1815 -- -- -- -- 99 %   04/09/25 1814 108/58 -- -- 20 --   04/09/25 1812 109/66 -- -- 18 --   04/09/25 1810 122/59 -- -- 18 100 %   04/09/25 1808 133/60 -- -- 22 --   04/09/25 1806 122/61 -- -- -- --   04/09/25 1610 -- -- -- -- 97 %   04/09/25 1430 119/65 -- -- 16 --   04/09/25 1315 113/61 -- -- 16 --   04/09/25 1045 112/53 98  F (36.7  C) Oral 16 --   04/09/25 1000 107/56 -- -- 16 --   04/09/25 0900 108/54 -- -- 16 --   04/09/25 0800 103/55 -- -- 16 --   04/09/25 0633 -- 98.3  F (36.8  C) -- -- --   04/08/25 2120 -- 98.6  F (37  C) -- -- --   04/08/25 2100 121/68 -- -- -- --       General appearance: comfortable    CONTRACTIONS: Contractions every 2-3 minutes.  Palpate: moderate  FETAL HEART TONES: baseline 120 with moderate FHR variability and    accelerations yes. Decelerations no.    NST: REACTIVE  CST: NEGATIVE  ROM: not ruptured  PELVIC EXAM:deferred  Fetal Position:  cephalic  Bloody show: Yes   Pitocin- 16 mu/min.,  Antibiotics- none  Cervical ripening:  N/A  ASSESSMENT:  ==============  IUP @ 39w2d active labor   Fetal Heart rate tracing Category category one  GBS- negative     PLAN:  ===========  comfort measures prn   Pain medication epidural in place and working well.   Anticipate   Labor induction with Pitocin  reevaluate in 2-4 hours/PRN         Medically Ready for Discharge: Anticipated in 2-4 Days        Queenie Wing CNM, APRN GUILLAUMEM, CNM

## 2025-04-10 NOTE — PLAN OF CARE
Problem: Adult Inpatient Plan of Care  Goal: Optimal Comfort and Wellbeing  Outcome: Progressing  Intervention: Monitor Pain and Promote Comfort  Recent Flowsheet Documentation  Taken 4/10/2025 7093 by Madelyn Salgado RN  Pain Management Interventions: (offered hot packs) medication (see MAR)   Goal Outcome Evaluation:      Plan of Care Reviewed With: patient    Overall Patient Progress: improvingOverall Patient Progress: improving     Data: Vital signs within normal limits. Postpartum checks within normal limits - see flow record. Patient eating and drinking normally. Patient able to empty bladder independently and is up ambulating. No apparent signs of infection.  Perineum  healing well. Patient performing self cares and is able to care for infant.  Action: Patient medicated during the shift for pain and cramping. See MAR. Patient reassessed within 1 hour after each medication and pain was improved - patient stated she was comfortable. Patient education done about postpartum cares. See flow record.  Response: Positive attachment behaviors observed with infant. Support persons  Carloz present.   Plan: Anticipate discharge on tomorrow AM.

## 2025-04-10 NOTE — PLAN OF CARE
Goal Outcome Evaluation:Vital signs stable. Postpartum assessment WDL. Pain controlled with Tylenol and Ibuprofen. Patient voiding without difficulty. Breastfeeding on cue with minimal assist. Patient and infant bonding well. Plan of care ongoing.       Plan of Care Reviewed With: patient, spouse    Overall Patient Progress: improvingOverall Patient Progress: improving     Problem: Adult Inpatient Plan of Care  Goal: Optimal Comfort and Wellbeing  Intervention: Monitor Pain and Promote Comfort  Recent Flowsheet Documentation  Taken 4/10/2025 1623 by Preeti Richards, RN  Pain Management Interventions: declines  Taken 4/10/2025 1553 by Preeti Richards, RN  Pain Management Interventions:   medication (see MAR)   heat applied     Problem: Labor  Goal: Hemostasis  Outcome: Progressing

## 2025-04-10 NOTE — PROGRESS NOTES
S:Notified by RN of T decels at 2035. In room to reposition patient but decels that were late in timing were getting deeper to the kishore and difficult to trace. Consented for AROM to place FSE.     O:  Blood pressure 126/62, temperature 98  F (36.7  C), temperature source Oral, resp. rate 20, last menstrual period 06/25/2024, SpO2 99%, not currently breastfeeding.  Patient Vitals for the past 24 hrs:   BP Temp Temp src Resp SpO2   04/09/25 2027 -- -- -- -- 99 %   04/09/25 1930 -- -- -- -- 99 %   04/09/25 1928 126/62 -- -- -- --   04/09/25 1925 -- -- -- -- 99 %   04/09/25 1923 117/68 -- -- -- --   04/09/25 1920 -- -- -- -- 99 %   04/09/25 1919 112/61 -- -- -- --   04/09/25 1915 -- -- -- -- 100 %   04/09/25 1914 113/65 -- -- -- --   04/09/25 1910 -- -- -- -- 100 %   04/09/25 1909 114/69 -- -- -- --   04/09/25 1905 123/58 -- -- -- 100 %   04/09/25 1859 132/67 -- -- -- --   04/09/25 1855 -- -- -- -- 100 %   04/09/25 1854 138/62 -- -- -- --   04/09/25 1850 -- -- -- -- 100 %   04/09/25 1848 113/74 -- -- -- --   04/09/25 1845 -- -- -- -- 100 %   04/09/25 1843 (!) 144/65 -- -- -- --   04/09/25 1841 120/58 -- -- -- 100 %   04/09/25 1836 121/66 -- -- -- --   04/09/25 1826 121/65 -- -- -- --   04/09/25 1825 -- -- -- -- 100 %   04/09/25 1824 108/55 -- -- -- --   04/09/25 1822 112/61 -- -- -- --   04/09/25 1820 107/58 -- -- -- 100 %   04/09/25 1818 107/58 -- -- -- --   04/09/25 1816 112/62 -- -- 20 --   04/09/25 1815 -- -- -- -- 99 %   04/09/25 1814 108/58 -- -- 20 --   04/09/25 1812 109/66 -- -- 18 --   04/09/25 1810 122/59 -- -- 18 100 %   04/09/25 1808 133/60 -- -- 22 --   04/09/25 1806 122/61 -- -- -- --   04/09/25 1610 -- -- -- -- 97 %   04/09/25 1430 119/65 -- -- 16 --   04/09/25 1315 113/61 -- -- 16 --   04/09/25 1045 112/53 98  F (36.7  C) Oral 16 --   04/09/25 1000 107/56 -- -- 16 --   04/09/25 0900 108/54 -- -- 16 --   04/09/25 0800 103/55 -- -- 16 --   04/09/25 0633 -- 98.3  F (36.8  C) -- -- --       General  appearance: comfortable experiencing pelvic pressure with contractions    CONTRACTIONS: Contractions every 2-3 minutes.  Palpate: moderate  FETAL HEART TONES: baseline 120 with moderate FHR variability and    accelerations no. Decelerations yes.    ROM: clear fluid  PELVIC EXAM:PELVIC EXAM: 6/ 80%/ Mid/ soft/ -1   Fetal Position:  cephalic  Bloody show: No  Pitocin- 8 mu/min.,  Antibiotics- none  Cervical ripening: s/p cook catheter  ASSESSMENT:  ==============  IUP @ 39w2d active labor   Fetal Heart rate tracing Category category two (huddle with RN and stayed at bedside during interventions, which included: maternal position changes, decreasing pitocin dose in half, IV fluid bolus, placement of a FSE. After 60 minutes FHTs showed a return of variability and a resolution of decelerations. 20 minutes after that at 2145 pitocin was stopped due to tachysystole.   GBS- negative     PLAN:  ===========  comfort measures prn   Pain medication epidural infusing with good relief.   Anticipate   MD consultant on call Dr Portillo updated about Cat 2 FHTs for 60 min in patient w/ hx of CS/ Recommend dicussion and  consent signed if FHTs return to ct 2. Resident in to talk to patient. But given Return of Cat 1 FHTs will continue TOLAC. OB/GYN team is available prn  reevaluate in 2-4 hours/PRN         Medically Ready for Discharge: Anticipated in 2-4 Days        Queenie Wing, GENNA, APRN GENNA, GENNA

## 2025-04-10 NOTE — PLAN OF CARE
Assumed care of pt at 1515. Pt laboring comfortably with position changes and support people at bedside. Discussed plans for epidural eventually. Pt will notify RN when she is ready for pain intervention. Pitocin running at 16mu/hr, will titrate cautiously as pt is TOLAC. 1700 SVE by CNM was 5/20/-2. 1730 pt requested epidural, anesthesia notified. 1800 epidural placed, pt tolerated well.  2035 pt began to have recurrent late decelerations, provider notified and at bedside. Position changes initiated, LR bolus given and Pitocin halved. FHR resolved, however pt began to have a tachysystole contraction pattern and Pitocin was stopped. Plan to reassess at 2330. Report given to Silvio PORRAS RN at 0348

## 2025-04-10 NOTE — PLAN OF CARE
Patient arrived to [facility] unit via wheelchair at 0250 ,with belongings, accompanied by spouse/ significant other, with infant in arms. Received report from  SELENA Smith  and checked bands. Unit and room orientation completd. Call light within arms reach; no concerns present at this time. Continue with plan of care.

## 2025-04-10 NOTE — LACTATION NOTE
Brief Lactation Consult    Returned to room at request of patient. She is breastfeeding and would like LC to assess latch. Mother has baby in football hold on the left breast, latch appears deep and mother reports it is comfortable. Baby sustains latch for about 5 minutes and there are two audible swallows during that time. She is off and on the breast a bit after that but mom is able to re-latch independently.    Education: Reinforced supportive positioning and signs of a good latch.     Plan: Continue breastfeeding on cue with RN support as needed with a goal of 8-12 feedings per day. Encourage frequent skin to skin, breast massage and hand expression with supplementation of  colostrum.      Caridad Anderson, RN, IBCLC   Lactation Consultant  [place]: [facility] [phone]  Office: [phone]

## 2025-04-10 NOTE — PROGRESS NOTES
Pt transferred to [facility] at 0250 via wheelchair and holding infant. Report given to Carlee @ 0230. VSS. Fundal checks WNL. Bleeding controlled. Uterine and bleeding assessment performed with [facility] RN upon transfer. Bands verified.

## 2025-04-11 VITALS
HEART RATE: 65 BPM | WEIGHT: 172.18 LBS | BODY MASS INDEX: 29.55 KG/M2 | RESPIRATION RATE: 16 BRPM | SYSTOLIC BLOOD PRESSURE: 120 MMHG | DIASTOLIC BLOOD PRESSURE: 75 MMHG | TEMPERATURE: 97.6 F | OXYGEN SATURATION: 98 %

## 2025-04-11 PROCEDURE — 250N000013 HC RX MED GY IP 250 OP 250 PS 637: Performed by: ADVANCED PRACTICE MIDWIFE

## 2025-04-11 RX ADMIN — ACETAMINOPHEN 650 MG: 325 TABLET, FILM COATED ORAL at 10:25

## 2025-04-11 RX ADMIN — ACETAMINOPHEN 650 MG: 325 TABLET, FILM COATED ORAL at 06:15

## 2025-04-11 RX ADMIN — ACETAMINOPHEN 650 MG: 325 TABLET, FILM COATED ORAL at 01:56

## 2025-04-11 RX ADMIN — IBUPROFEN 800 MG: 800 TABLET, FILM COATED ORAL at 01:56

## 2025-04-11 RX ADMIN — IBUPROFEN 800 MG: 800 TABLET, FILM COATED ORAL at 09:32

## 2025-04-11 ASSESSMENT — ACTIVITIES OF DAILY LIVING (ADL)
ADLS_ACUITY_SCORE: 21

## 2025-04-11 NOTE — PROGRESS NOTES
Anesthesia Post-Partum Follow-Up Note After Vaginal Delivery with Epidural    Patient: Regina Mays    Patient location: Post-partum floor    Anesthesia type: Epidural    Subjective  Regina Mays does not complain of pruritis at this time. She denies weakness, denies paresthesia, denies difficulties voiding, denies nausea or vomiting, and denies headache. She is able to ambulate and tolerates regular diet.     Objective  Respiratory Function (RR / SpO2 / Airway Patency): Satisfactory  Cardiac Function (HR / Rhythm / BP): Satisfactory  Strength and sensation lower extremities: Normal  Epidual site: No signs of infection or inflammation    Most recent vitals  /69 (BP Location: Right arm, Patient Position: Semi-Bruner's, Cuff Size: Adult Regular)   Pulse 65   Temp 36.7  C (98  F) (Oral)   Resp 16   Wt 78.1 kg (172 lb 2.9 oz)   LMP 2024   SpO2 98%   Breastfeeding Unknown   BMI 29.55 kg/m      Assessment and plan  Regina Mays is a 29 year old female  post partum day #1 s/p vaginal delivery with epidural    At this time, there is no evidence of adverse side effects associated with anesthetic procedures performed. We encourage the continued use of multimodal analgesic therapy for adequate pain management over the next several days, and if any questions arise regarding anesthetic care, please reach out to the obstetric anesthesiology team.     Thank you for including us in the care of this patient.   Tyler SMITH Ma, MD, CA-1

## 2025-04-11 NOTE — PLAN OF CARE
Goal Outcome Evaluation:           VSS and postpartum assessment WDL. Pain adequately managed with tylenol and ibuprofen. Voiding without difficulty. Breastfeeding independently. Bonding well with . Anticipating discharge today.        Plan of Care Reviewed With: patient, partner     Overall Patient Progress: improving           Problem: Adult Inpatient Plan of Care  Goal: Optimal Comfort and Wellbeing  Outcome: Progressing  Intervention: Provide Person-Centered Care  Recent Flowsheet Documentation  Taken 2025 0225 by Kirti Beverly RN  Trust Relationship/Rapport:   care explained   choices provided     Problem: Postpartum (Vaginal Delivery)  Goal: Optimal Pain Control and Function  Outcome: Progressing

## 2025-04-11 NOTE — PLAN OF CARE
"  Problem: Adult Inpatient Plan of Care  Goal: Patient-Specific Goal (Individualized)  Description: You can add care plan individualizations to a care plan. Examples of Individualization might be:  \"Parent requests to be called daily at 9am for status\", \"I have a hard time hearing out of my right ear\", or \"Do not touch me to wake me up as it startlesme\".  Outcome: Adequate for Care Transition   Goal Outcome Evaluation:      Plan of Care Reviewed With: patient    Overall Patient Progress: improvingOverall Patient Progress: improving         VSS.  Assessments WNL.  Reports headache (baseline per pt), lower back pain, cramping and perineum pain \"4\" tolerating with prn meds, heat and perineal comfort items.  Reports no dizziness with walking.  Voiding and had BM per pt.  Breast feeding better this morning per pt and requested to be seen by LC prior to discharge.  Completed required docs this morning.  Seen by GENNA Duarte.  States readiness to discharge to home today with Blume.  Pt's mother present and Carloz.  Plan for discharge to home when Carloz returns.  Call light is within reach for assistance.    "

## 2025-04-11 NOTE — LACTATION NOTE
This note was copied from a baby's chart.  Follow Up Consult    Infant Name: Jolene    Infant's Primary Care Clinic: [facility]    Maternal Assessment: Regina reports nipple tenderness. Nipples are intact bilaterally with slight redness.       Infant Assessment:  Jolene has age appropriate output and weight loss.      Weight Change Since Birth: -6% at 1 day old      Feeding History: Regina reports that she has had some discomfort with the latch. She is using hydrogels for comfort between feedings. This morning she was able to get a more comfortable latch on the left side but she has more difficulty on the right side.       Feeding Assessment: Regina had just finished feeding Blume on the left side on arrival to room. She moves her to football position on the right side and latches her centered around the nipple and reports some discomfort. Encouraged breaking the latch and aiming the nipple high when latching, bringing lower areola into baby's mouth first before tucking the nipple in. Regina is able to get Jolene to latch more deeply and comfortably with these adjustments and Jolene continues feeding with coordinated sucking and intermittent audible swallows.      Education:   [x] Expected  feeding patterns in the first few days (pg. 38 of Your Guide to To Postpartum and  Care)/ the Second Night  [x] Benefits of hand expression of colostrum   [x] Benefits of feeding on cue  [x] Benefits of skin to skin  [x] Breastfeeding positions  [x] Tips to get and maintain a deep latch  [x] Nutritive vs.non-nutritive sucking  [x] Gentle breast compressions as needed to enhance milk transfer  [x] How to tell when baby is finished  [x] Signs breastfeeding is going well (comfortable latch, audible swallows, age appropriate output and weight loss)    [x] Outpatient lactation resources    Handouts: Infant Feeding Log (Week 1, Your Guide to Postpartum &  Care Book) and [facility] Lactation  Resources    Home Breast Pump: has pump at home    Plan: Continue breastfeeding on cue with a goal of 8-12 feedings per day. Encourage frequent skin to skin, breast massage and hand expression.     Reviewed [facility] with family. Encouraged follow up with outpatient lactation consultant as needed after discharge. Family plans to follow up with [facility] and is aware of lactation support at [facility] as well.       Beth Del Toro RN, IBCLC   Lactation Consultant  [place]: [facility] [phone]  Office: [phone]

## 2025-04-11 NOTE — PROGRESS NOTES
Discharged to home at 1330 with Blume and Carloz in comfortable and stable condition.  Regina states understanding of when to seek care, follow up, resources and medications.  Has breast pump at home.  All questions answered.  States readiness for discharge.  Has belongings.  NB ID bands checked/matched prior to discharge.  Transport to escort from hospital.

## 2025-04-11 NOTE — PLAN OF CARE
Goal Outcome Evaluation:  VSS and postpartum assessments WDL.  Up ad lorenza with steady gait and independent with cares.  Bonding well with infant.  Breastfeeding on cue independently.  Pain managed with tylenol, ibuprofen, ice and medicated pads.   present and supportive.  Will continue with postpartum cares and education per plan of care.    Plan of Care Reviewed With: patient    Overall Patient Progress: improvingOverall Patient Progress: improving

## 2025-04-11 NOTE — DISCHARGE SUMMARY
Postpartum Day #2 Progress Note    Subjective:  Regina is feeling great, very proud of herself and happy about . Feeling good about labor and birth. Discussed healing of laceration, birth control, and musculoskeletal pain. Pain is well controlled with Ibuprofen and Tylenol. Bleeding is like a period. Breastfeeding is initiated with good latch. Discussed birth control options including mini pill, Nexplanon, Depo, Mirena IUD, and Paragard IUD. Regina Mays has chosen nothing, choosing exclusive breastfeeding and then will consider birth control when baby starts eating solids. Reviewed postpartum discharge teaching and warning signs including increased bleeding, signs of infection, risks of blood clots and pre-eclampsia, and mood changes. Plan for discharge today.    Objective:  Patient Vitals for the past 24 hrs:   BP Temp Temp src Pulse Resp Weight   25 0621 -- -- -- -- -- 78.1 kg (172 lb 2.9 oz)   25 0156 113/69 98  F (36.7  C) Oral 65 16 --   04/10/25 1955 114/70 98.6  F (37  C) Oral 70 16 --   04/10/25 1553 (!) 88/75 98.3  F (36.8  C) Oral 71 18 --   04/10/25 1215 130/69 98.5  F (36.9  C) Oral 90 17 --        Hemoglobin   Date Value Ref Range Status   04/10/2025 11.1 (L) 11.7 - 15.7 g/dL Final   2025 11.1 (L) 11.7 - 15.7 g/dL Final   2014 12.5 11.7 - 15.7 g/dL Final   2012 12.7 11.7 - 15.7 g/dL Final        Physical Exam:  Gen: Alert, no acute distress  Heart: Regular rate  Lungs: Non-labored respirations on room air  Abdomen: Soft, non tender, non distended. Fundus firm at umbilicus.  Extremities: Minimal edema bilaterally.     Assessment/Plan:  1. Postpartum day 2  - Continue current management  - Anticipate discharge to home today  - Discharge teaching completed. Reviewed warning signs, including signs of postpartum preeclampsia, hemorrhage, infection, and postpartum depression, and how to notify provider  - Encouraged to schedule postpartum follow up within  6  JORGE DarlingM

## 2025-04-11 NOTE — DISCHARGE INSTRUCTIONS
Internal Medicine Progress Note     Admission Date: 2020                     Patient: Israel Sutton             Date of Service:  09/10/20  : 1950                           70 year old male    Presenting Complaint:  Shortness of breath  Leg swelling       Subjective:   Up in the chair, pain better, C-diff +       Allergies:  ALLERGIES:  No Known Allergies        Physical Exam:  GENERAL: Alert and oriented x3, interacts very well, follows commands. Memory, mood and affect are at baseline.    Vitals:    09/10/20 1621   BP: 112/66   Pulse: 88   Resp: 20   Temp: 97.6 °F (36.4 °C)       SKIN: Warm and dry. + ICD , + lap healing incision, + ileal conduit to collection   HEENT: Both pupils are equal and responding to light. Nose: Both nostrils patent.  EXTREMITIES: Trace edema. Pedal pulses are very palpable.  NECK: Supple. Lymph nodes are not felt in the neck region.       LUNGS:  Lungs show coarse breath sounds bilaterally with moist crackles  CARDIOVASCULAR: S1, S2 with displaced PMI.  ABDOMEN: Soft, bowel sounds positive, nontender.  RECTAL AND PELVIC: Not performed.  CENTRAL NERVOUS SYSTEM: Cranial nerves are grossly intact 2-12. Motor strength both upper and lower extremities, positive 5/5. Reflexes, bulk, tone baseline. Sensory: Fine touch is intact. Gait not assessed.       Labs:  Recent Labs   Lab 09/10/20  0610   WBC 8.6   RBC 2.56*   HGB 7.6*   HCT 25.0*        Recent Labs   Lab 09/10/20  0610 20  1831 20  1830 20  1823   SODIUM 132*  --  132* 132*   POTASSIUM 3.7  --  4.7 4.2   CHLORIDE 102  --  101 102   CO2 21  --  19* 23   BUN 39*  --  32* 29*   CREATININE 2.06* 2.10* 2.04* 2.02*   GLUCOSE 185*  --  144* 186*   CALCIUM 8.0*  --  8.4 8.1*         Impression:     COVID-19 rule out  I50.21 Acute systolic (congestive) heart failure    R54 Age-related physical debility  I25.10 Atherosclerotic heart disease of native coronary artery without angina pectoris  I50.22 Chronic  Warning Signs after Having a Baby    Keep this paper on your fridge or somewhere else where you can see it.    Call your provider if you have any of these symptoms up to 12 weeks after having your baby.    Thoughts of hurting yourself or your baby  Pain in your chest or trouble breathing  Severe headache not helped by pain medicine  Eyesight concerns (blurry vision, seeing spots or flashes of light, other changes to eyesight)  Fainting, shaking or other signs of a seizure    Call 9-1-1 if you feel that it is an emergency.     The symptoms below can happen to anyone after giving birth. They can be very serious. Call your provider if you have any of these warning signs.    My provider s phone number: _______________________    Losing too much blood (hemorrhage)    Call your provider if you soak through a pad in less than an hour or pass blood clots bigger than a golf ball. These may be signs that you are bleeding too much.    Blood clots in the legs or lungs    After you give birth, your body naturally clots its blood to help prevent blood loss. Sometimes this increased clotting can happen in other areas of the body, like the legs or lungs. This can block your blood flow and be very dangerous.     Call your provider if you:  Have a red, swollen spot on the back of your leg that is warm or painful when you touch it.   Are coughing up blood.     Infection    Call your provider if you have any of these symptoms:  Fever of 100.4 F (38 C) or higher.  Pain or redness around your stitches if you had an incision.   Any yellow, white, or green fluid coming from places where you had stitches or surgery.    Mood Problems (postpartum depression)    Many people feel sad or have mood changes after having a baby. But for some people, these mood swings are worse.     Call your provider right away if you feel so anxious or nervous that you can't care for yourself or your baby.    Preeclampsia (high blood pressure)    Even if you  "didn't have high blood pressure when you were pregnant, you are at risk for the high blood pressure disease called preeclampsia. This risk can last up to 12 weeks after giving birth.     Call your provider if you have:   Pain on your right side under your rib cage  Sudden swelling in the hands and face    Remember: You know your body. If something doesn't feel right, get medical help.     For informational purposes only. Not to replace the advice of your health care provider. Copyright 2020 [facility]. All rights reserved. Clinically reviewed by Melva Cedeno, RNC-OB, MSN. [employer] [id number] - Rev .    After Your Delivery (the Postpartum Period): Care Instructions  Overview     After childbirth (postpartum period), your body goes through many changes as you recover. In these weeks after delivery, try to take good care of yourself. Get rest whenever you can and accept help from others.  It may take 4 to 6 weeks to feel like yourself again, and possibly longer if you had a  birth. You may feel sore or very tired as you recover. After delivery, you may continue to have contractions as the uterus returns to the size it was before your pregnancy. You will also have some vaginal bleeding. And you may have pain around the vagina as you heal. Several days after delivery you may also have pain and swelling in your breasts as they fill with milk. There are things you can do at home to help ease these discomforts.  After childbirth, it's common to feel emotional. You may feel irritable, cry easily, and feel happy one minute and sad the next. This is called the \"baby blues.\" Hormone changes are one cause of these emotional changes. These feelings usually get better within a couple of weeks. If they don't, talk to your doctor or midwife.  In the first couple of weeks after you give birth, your doctor or midwife may want to check in with you and make a plan for follow-up care. You will likely have a " systolic (congestive) heart failure  I48.2  Chronic atrial fibrillation  Ventricular tachycardia.  N17.9 Acute kidney failure, unspecified  N18.3 Chronic kidney disease, stage 3 (moderate)  Bladder cancer s/p cystoprostatectomy with ileal loop urinary diversion with bilateral extensive pelvic lymph node dissection with Dr Sol 7/27 complicated by VT arrest s/p ICD and YOEL to pLAD and Lcx   Acute on chronic systolic heart failure    D64.9 Anemia, unspecified  Sepsis not present on admission     Plan:    COVID-19 negative.    C. Difficile positive, started on oral vancomycin.      Continue amnio, aspirin and Lipitor.  Keep on tele.  Continue metoprolol and Mexitil     Continue Plavix  Continue iron    Continue lasix     Monitor renal function     Heparin for DVT prophylaxis    AM labs     Anxiety, Xanax as needed.          TONY Paul/Martha Infante MD  [phone]    09/10/20    Attending    Patient seen and examined  History , physical findings.  Labs medications treatment plan  Reviewed    Agree with Current plan.      Martha Infante MD     complete postpartum visit in the first 3 months after delivery. At that time, your doctor or midwife will check on your recovery and see how you're doing. But if you have questions or concerns before then, you can always call your doctor or midwife.  Follow-up care is a key part of your treatment and safety. Be sure to make and go to all appointments, and call your doctor if you are having problems. It's also a good idea to know your test results and keep a list of the medicines you take.  How can you care for yourself at home?  Taking care of your body  Use pads instead of tampons for bleeding. After birth, you will have bloody vaginal discharge. You may also pass some blood clots that shouldn't be bigger than an egg. Over the next 6 weeks or so, your bleeding should decrease a little every day and slowly change to a pinkish and then whitish discharge.  For cramps or mild pain, try an over-the-counter pain medicine, such as acetaminophen (Tylenol) or ibuprofen (Advil, Motrin). Read and follow all instructions on the label.  To ease pain around the vagina or from hemorrhoids:  Put ice or a cold pack on the area for 10 to 20 minutes at a time. Put a thin cloth between the ice and your skin.  Try sitting in a few inches of warm water (sitz bath) when you can or after bowel movements.  Clean yourself with a gentle squeeze of warm water from a bottle instead of wiping with toilet paper.  Use witch hazel or hemorrhoid pads (such as Tucks).  Try using a cold compress for sore and swollen breasts. And wear a supportive bra that fits.  Ease constipation by drinking plenty of fluids and eating high-fiber foods. Ask your doctor or midwife about over-the-counter stool softeners.  Activity  Rest when you can.  Ask for help from family or friends when you need it.  If you can, have another adult in your home for at least 2 or 3 days after birth.  When you feel ready, try to get some exercise every day. For many people, walking  is a good choice. Don't do any heavy exercise until your doctor or midwife says it's okay.  Ask your doctor or midwife when it is okay to have vaginal sex.  If you don't want to get pregnant, talk to your doctor or midwife about birth control options. You can get pregnant even before your period returns. Also, you can get pregnant while you are breastfeeding.  Talk to your doctor or midwife if you want to get pregnant again. They can talk to you about when it is safe.  Emotional health  It's normal to have some sadness, anxiety, and mood swings after delivery. It may help to talk with a trusted friend or family member. You can also call the Maternal Mental Health Hotline at 6-330-[facility] (1-546.235.7690) for support. If these mood changes last more than a couple of weeks, talk to your doctor or midwife.  When should you call for help?  Share this information with your partner, family, or a friend. They can help you watch for warning signs.  Call 911  anytime you think you may need emergency care. For example, call if:    You feel you cannot stop from hurting yourself, your baby, or someone else.     You passed out (lost consciousness).     You have chest pain, are short of breath, or cough up blood.     You have a seizure.   Where to get help 24 hours a day, 7 days a week   If you or someone you know talks about suicide, self-harm, a mental health crisis, a substance use crisis, or any other kind of emotional distress, get help right away. You can:    Call the Suicide and Crisis Lifeline at 748.     Call 3-009-819-TALK (1-753.298.5854).     Text HOME to 114830 to access the Crisis Text Line.   Consider saving these numbers in your phone.  Go to [employer] for more information or to chat online.  Call your doctor or midwife now or seek immediate medical care if:    You have signs of hemorrhage (too much bleeding), such as:  Heavy vaginal bleeding. This means that you are soaking through one or more pads in an  "hour. Or you pass blood clots bigger than an egg.  Feeling dizzy or lightheaded, or you feel like you may faint.  Feeling so tired or weak that you cannot do your usual activities.  A fast or irregular heartbeat.  New or worse belly pain.     You have signs of infection, such as:  A fever.  Increased pain, swelling, warmth, or redness from an incision or wound.  Frequent or painful urination or blood in your urine.  Vaginal discharge that smells bad.  New or worse belly pain.     You have symptoms of a blood clot in your leg (called a deep vein thrombosis), such as:  Pain in the calf, back of the knee, thigh, or groin.  Swelling in the leg or groin.  A color change on the leg or groin. The skin may be reddish or purplish, depending on your usual skin color.     You have signs of preeclampsia, such as:  Sudden swelling of your face, hands, or feet.  New vision problems (such as dimness, blurring, or seeing spots).  A severe headache.     You have signs of heart failure, such as:  New or increased shortness of breath.  New or worse swelling in your legs, ankles, or feet.  Sudden weight gain, such as more than 2 to 3 pounds in a day or 5 pounds in a week.  Feeling so tired or weak that you cannot do your usual activities.     You had spinal or epidural pain relief and have:  New or worse back pain.  Increased pain, swelling, warmth, or redness at the injection site.  Tingling, weakness, or numbness in your legs or groin.   Watch closely for changes in your health, and be sure to contact your doctor or midwife if:    Your vaginal bleeding isn't decreasing.     You feel sad, anxious, or hopeless for more than a few days.     You are having problems with your breasts or breastfeeding.   Where can you learn more?  Go to https://www.healthwise.net/patiented  Enter A461 in the search box to learn more about \"After Your Delivery (the Postpartum Period): Care Instructions.\"  Current as of: April 30, 2024  Content Version: " 14.4    3172-4910 [employer].   Care instructions adapted under license by your healthcare professional. If you have questions about a medical condition or this instruction, always ask your healthcare professional. [employer] disclaims any warranty or liability for your use of this information.

## 2025-04-12 ENCOUNTER — PATIENT OUTREACH (OUTPATIENT)
Dept: CARE COORDINATION | Facility: CLINIC | Age: 30
End: 2025-04-12
Payer: COMMERCIAL

## 2025-04-12 NOTE — PROGRESS NOTES
"Clinic Care Coordination Contact  Transitions of Care Outreach  Chief Complaint   Patient presents with    Clinic Care Coordination - Post Hospital       Most Recent Admission Date: 2025   Most Recent Admission Diagnosis: Encounter for supervision of other normal pregnancy, third trimester - Z34.83  History of  delivery affecting pregnancy - O34.219     Most Recent Discharge Date: 2025   Most Recent Discharge Diagnosis:  (vaginal birth after ) - O34.219  , delivered - O34.219     Transitions of Care Assessment    Discharge Assessment  How are you doing now that you are home?: \" Feeling really well thanks for calling \"  How are your symptoms? (Red Flag symptoms escalate to triage hotline per guidelines): Improved  Do you know how to contact your clinic care team if you have future questions or changes to your health status? : Yes  Does the patient have their discharge instructions? : Yes  Does the patient have questions regarding their discharge instructions? : No  Were you started on any new medications or were there changes to any of your previous medications? : Yes  Does the patient have all of their medications?: Yes  Do you have questions regarding any of your medications? : No  Do you have all of your needed medical supplies or equipment (DME)?  (i.e. oxygen tank, CPAP, cane, etc.): Yes    Post-op ([facility] Only)  If the patient had a surgery or procedure, do they have any questions for a nurse?: No           Follow up Plan     Discharge Follow-Up  Discharge follow up appointment scheduled in alignment with recommended follow up timeframe or Transitions of Risk Category? (Low = within 30 days; Moderate= within 14 days; High= within 7 days): No    No future appointments.    Outpatient Plan as outlined on AVS reviewed with patient.    For any urgent concerns, please contact our 24 hour nurse triage line: [phone] ([phone])       Liana Luo MA              "
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
## 2025-04-24 ENCOUNTER — OFFICE VISIT (OUTPATIENT)
Dept: MIDWIFE SERVICES | Facility: CLINIC | Age: 30
End: 2025-04-24
Payer: COMMERCIAL

## 2025-04-24 DIAGNOSIS — O92.29 POSTPARTUM NIPPLE PAIN: Primary | ICD-10-CM

## 2025-04-24 DIAGNOSIS — N64.3 HYPERLACTATION: ICD-10-CM

## 2025-04-24 ASSESSMENT — EDINBURGH POSTNATAL DEPRESSION SCALE (EPDS)
I HAVE BLAMED MYSELF UNNECESSARILY WHEN THINGS WENT WRONG: NO, NEVER
I HAVE BEEN SO UNHAPPY THAT I HAVE HAD DIFFICULTY SLEEPING: NOT AT ALL
I HAVE LOOKED FORWARD WITH ENJOYMENT TO THINGS: AS MUCH AS I EVER DID
I HAVE BEEN ABLE TO LAUGH AND SEE THE FUNNY SIDE OF THINGS: AS MUCH AS I ALWAYS COULD
I HAVE FELT SCARED OR PANICKY FOR NO GOOD REASON: NO, NOT AT ALL
I HAVE FELT SAD OR MISERABLE: NO, NOT AT ALL
TOTAL SCORE: 2
THINGS HAVE BEEN GETTING ON TOP OF ME: NO, I HAVE BEEN COPING AS WELL AS EVER
I HAVE BEEN ANXIOUS OR WORRIED FOR NO GOOD REASON: YES, SOMETIMES
I HAVE BEEN SO UNHAPPY THAT I HAVE BEEN CRYING: NO, NEVER
THE THOUGHT OF HARMING MYSELF HAS OCCURRED TO ME: NEVER

## 2025-04-24 NOTE — PATIENT INSTRUCTIONS
Use good positioning for deep latch, with baby held close to body and baby's head/shoulders/hips in good alignment.  When in a seated position, use a pillow to help bring baby close to breasts, and stepstool to elevate your knees above hips.    When bringing your baby to your breast, compress your breast vertically and in line with baby's mouth--this will help them to get a larger mouthful of breast and a deeper latch. Babies latch best to the breast by bringing their chin in first, so point your nipple towards baby's nose, tuck the chin in close, and then wait for her mouth to open.  When her mouth opens, bring her head in deeply.  Baby's chin should be snugged deeply in your breast, their upper cheeks should be touching the breast, and their nose just out of the breast.   If there is pinching or pain, try using a finger to give a little gentle pressure on her chin to help her open more widely and take in more of your breast.  If it is still painful, use a finger to break the suction, remove baby from the breast and try again until there is no pain with nursing.  There is sometimes a little pain when the baby first begins sucking, but after the first few seconds there should be no pain--only a tugging feeling.   For the fast milk letdown that makes it difficult for Blume to stay latched to the breast or causes them to cough/sputter while nursing, try using the laid-back or sidelying nursing positions.   You can also try taking baby off of breast when the strong milk letdown starts, and allow milk to flow out into burp cloth, re-latching baby after flow has slowed.   Continue to nurse baby on cue, 8-12 times each day.  Once your baby has returned to their birthweight, you can trust them to awaken when they need to eat--you do not need to awaken them on a schedule.  When you nurse, feed on one side until baby finishes swallowing.  Once swallowing slows, use breast compression to encourage more intake, but once  "baby is sleeping, coming off the breast, or just \"nibbling,\" you can take baby off the breast and move to the other side.  Offer both breasts at each feeding.   If she takes just one side, you do not need to pump the other--just begin on the artis side at the next feeding.  If you find you become very full and uncomfortable between feedings, pump or hand-express just a small amount;  just enough to relieve your fullness and allow you to remain comfortable until the next feeding (like an ounce).  Do not pump to completely \"empty\" your breast, because this will encourage an increase in milk supply, which will just worsen the sensations of fullness.      Many babies have frequent spitting up:  This is because the valve between their stomach and esophagus is a bit loose.  It is not caused by inappropriate feeding or burping patterns.  Even if it looks like large amounts, it is not a health problem to be concerned with unless your baby is not gaining weight well, or is extremely fussy and inconsolable most of the time.  If your baby is periodically content and gaining weight well, spitting up is more of a laundry problem than a health problem.   Follow up with lactation as needed, and pediatric provider as planned.  WorkSimple can be used for brief questions, but it's important to know that messages are not seen Friday through Sunday. If urgent help is needed, Monday through Friday you can call [phone] and one of our lactation consultants will get the message and respond; if you need a rapid response over a weekend or holiday, it is best to call your on-call maternity or pediatric provider.  Please feel free to schedule a return visit if the concern is more detailed;  telephone visits are also an option if you don't feel you need to be seen in person.   __________________       If you find that you are having a lot of uncomfortable leaking, you can just use a hand to put some pressure on your areolar area and this " "will stop the flow.  If you would like to collect the milk that is released with letdown without encouraging more supply, consider a type of  that does not use suction to stay in place.  The Haakaa Malina, Tameka Catch, and [employer] Milk-Saver can all be used in this way.  Alternatively, if you would like to use a  to gather enough milk for a bottle, you could use a type that stays in place with suction and draws out more milk.  The best way to use these is to nurse your baby on one side, and then when you move baby to the second side, place the  on the first side.  In this way baby always gets \"first choice.\"  Just collect the amount of milk that you will use.     _________    Video of sidelying breastfeeding  https://[email or web address] Blockube.com/watch?v=[email or web address]     For good videos on the laid-back breastfeeding position:  [email or web address].naturalbreastfeeding.com  [email or web address]     "

## 2025-05-19 NOTE — PROGRESS NOTES
Subjective:  Regina presents to clinic at 6 weeks following a successful  at 39 weeks on 4/10/2025. Regina was electively induced after she had worsening episodes of dizziness and shortness of breath at term.  She is feeling great today. Here with infant daughter Gopal. She has been so pleasantly surprised by what an easy baby Gopal has been, and by her physical recovery after her . Does feel some sensitivity around the area of periurethral laceration repair, but otherwise feeling really good. Overall very happy with her labor/birth experience.  Bleeding is minimal. Bowel function is normal. Bladder function is normal.  She is breastfeeding. Reports this has been going really well, easier than the first time. Her  gives the baby one bottle at night so she can get a longer stretch of sleep.  Has great support at home. Her  went back to work at 2 weeks PP; she is off work until August. Has her  come a couple times a week to spend time with her older son.    Last Pap NILM in . Not due for Pap today.    PHQ-9 score:        2025    10:45 AM   PHQ   PHQ-9 Total Score 1   Q9: Thoughts of better off dead/self-harm past 2 weeks Not at all     Objective:  /68   Pulse 82   Temp 97.9  F (36.6  C) (Oral)   Wt 74 kg (163 lb 1.6 oz)   LMP 2024   SpO2 98%   Breastfeeding Yes   BMI 28.00 kg/m      Physical Exam:  Gen: Alert, no acute distress  Heart: Regular rate  Lungs: Non labored respirations on room air  Abdomen: Soft, non tender, non distended. Small (1 cm) diastasis recti palpated  Pelvic exam: External genitalia normal appearing, no lesions. Small area of erythema - no edema, non tender to touch - around site of L periurethral laceration repair. Perineum well healed, no discharge, no lesions.    Assessment/Plan:  1. Routine postpartum follow-up (Primary)  - Discussed contraception/family planning: undecided on whether they want a third baby. Not wanting to start hormonal  contraception right now. Will probably use condoms in interim. Encouraged her to follow up if she has any further contraception needs. Recommended 12 month interpregnancy interval  - Discussed return to normal activity, core strengthening  - RTO for annual exam, sooner as needed    JORGE Schroeder CNM  5/19/2025

## 2025-05-22 ENCOUNTER — PRENATAL OFFICE VISIT (OUTPATIENT)
Dept: MIDWIFE SERVICES | Facility: CLINIC | Age: 30
End: 2025-05-22
Payer: COMMERCIAL

## 2025-05-22 VITALS
OXYGEN SATURATION: 98 % | HEART RATE: 82 BPM | WEIGHT: 163.1 LBS | TEMPERATURE: 97.9 F | BODY MASS INDEX: 28 KG/M2 | DIASTOLIC BLOOD PRESSURE: 68 MMHG | SYSTOLIC BLOOD PRESSURE: 109 MMHG

## 2025-05-22 ASSESSMENT — PATIENT HEALTH QUESTIONNAIRE - PHQ9
5. POOR APPETITE OR OVEREATING: NOT AT ALL
SUM OF ALL RESPONSES TO PHQ QUESTIONS 1-9: 1

## 2025-05-22 ASSESSMENT — ANXIETY QUESTIONNAIRES
GAD7 TOTAL SCORE: 2
7. FEELING AFRAID AS IF SOMETHING AWFUL MIGHT HAPPEN: NOT AT ALL
GAD7 TOTAL SCORE: 2
6. BECOMING EASILY ANNOYED OR IRRITABLE: SEVERAL DAYS
2. NOT BEING ABLE TO STOP OR CONTROL WORRYING: NOT AT ALL
5. BEING SO RESTLESS THAT IT IS HARD TO SIT STILL: NOT AT ALL
1. FEELING NERVOUS, ANXIOUS, OR ON EDGE: SEVERAL DAYS
IF YOU CHECKED OFF ANY PROBLEMS ON THIS QUESTIONNAIRE, HOW DIFFICULT HAVE THESE PROBLEMS MADE IT FOR YOU TO DO YOUR WORK, TAKE CARE OF THINGS AT HOME, OR GET ALONG WITH OTHER PEOPLE: NOT DIFFICULT AT ALL
3. WORRYING TOO MUCH ABOUT DIFFERENT THINGS: NOT AT ALL

## (undated) DEVICE — PREP CHLORAPREP 26ML TINTED ORANGE  260815

## (undated) DEVICE — SU VICRYL 0 CT-1 36" J346H

## (undated) DEVICE — GLOVE ESTEEM POWDER FREE SMT 6.5  2D72PT65

## (undated) DEVICE — SU MONOCRYL 0 CT-1 36" Y346H

## (undated) DEVICE — SU PLAIN 2-0 CT 27" 853H

## (undated) DEVICE — CATH TRAY FOLEY 16FR BARDEX W/DRAIN BAG STATLOCK 300316A

## (undated) DEVICE — PACK C-SECTION LF PL15OTA83B

## (undated) DEVICE — BARRIER INTERCEED 3X4" 4350

## (undated) DEVICE — SOL WATER IRRIG 1000ML BOTTLE 07139-09

## (undated) DEVICE — STOCKING SLEEVE COMPRESSION CALF LG

## (undated) DEVICE — DRSG STERI STRIP 1/4X3" R1541

## (undated) DEVICE — GLOVE PROTEXIS BLUE W/NEU-THERA 7.0  2D73EB70

## (undated) DEVICE — SOL NACL 0.9% IRRIG 1000ML BOTTLE 07138-09

## (undated) DEVICE — ESU GROUND PAD UNIVERSAL W/O CORD

## (undated) DEVICE — STRAP KNEE/BODY 31143004

## (undated) DEVICE — SU MONOCRYL 4-0 PS-2 18" UND Y496G

## (undated) RX ORDER — BUPIVACAINE HYDROCHLORIDE 2.5 MG/ML
INJECTION, SOLUTION EPIDURAL; INFILTRATION; INTRACAUDAL
Status: DISPENSED
Start: 2023-05-05

## (undated) RX ORDER — FENTANYL CITRATE-0.9 % NACL/PF 10 MCG/ML
PLASTIC BAG, INJECTION (ML) INTRAVENOUS
Status: DISPENSED
Start: 2023-05-05

## (undated) RX ORDER — ACETAMINOPHEN 325 MG/1
TABLET ORAL
Status: DISPENSED
Start: 2023-05-05

## (undated) RX ORDER — OXYTOCIN/0.9 % SODIUM CHLORIDE 30/500 ML
PLASTIC BAG, INJECTION (ML) INTRAVENOUS
Status: DISPENSED
Start: 2023-05-05

## (undated) RX ORDER — KETOROLAC TROMETHAMINE 30 MG/ML
INJECTION, SOLUTION INTRAMUSCULAR; INTRAVENOUS
Status: DISPENSED
Start: 2023-05-05

## (undated) RX ORDER — ONDANSETRON 2 MG/ML
INJECTION INTRAMUSCULAR; INTRAVENOUS
Status: DISPENSED
Start: 2023-05-05

## (undated) RX ORDER — LIDOCAINE HCL/EPINEPHRINE/PF 2%-1:200K
VIAL (ML) INJECTION
Status: DISPENSED
Start: 2023-05-05

## (undated) RX ORDER — MORPHINE SULFATE 1 MG/ML
INJECTION, SOLUTION EPIDURAL; INTRATHECAL; INTRAVENOUS
Status: DISPENSED
Start: 2023-05-05

## (undated) RX ORDER — PHENYLEPHRINE HCL IN 0.9% NACL 50MG/250ML
PLASTIC BAG, INJECTION (ML) INTRAVENOUS
Status: DISPENSED
Start: 2023-05-05